# Patient Record
Sex: FEMALE | Race: WHITE | NOT HISPANIC OR LATINO | Employment: PART TIME | URBAN - METROPOLITAN AREA
[De-identification: names, ages, dates, MRNs, and addresses within clinical notes are randomized per-mention and may not be internally consistent; named-entity substitution may affect disease eponyms.]

---

## 2017-01-10 ENCOUNTER — LAB CONVERSION - ENCOUNTER (OUTPATIENT)
Dept: OTHER | Facility: OTHER | Age: 62
End: 2017-01-10

## 2017-01-10 LAB
25(OH)D3 SERPL-MCNC: 50 NG/ML (ref 30–100)
CREATININE, RANDOM URINE (HISTORICAL): 265 MG/DL (ref 20–320)
HBA1C MFR BLD HPLC: 6.3 % OF TOTAL HGB
MAGNESIUM, UR (HISTORICAL): 2.1 MG/DL
MICROALBUMIN/CREATININE RATIO (HISTORICAL): 8 MCG/MG CREAT
TSH SERPL DL<=0.05 MIU/L-ACNC: 1.66 MIU/L (ref 0.4–4.5)

## 2017-01-12 ENCOUNTER — ALLSCRIPTS OFFICE VISIT (OUTPATIENT)
Dept: OTHER | Facility: OTHER | Age: 62
End: 2017-01-12

## 2017-01-17 ENCOUNTER — ALLSCRIPTS OFFICE VISIT (OUTPATIENT)
Dept: OTHER | Facility: OTHER | Age: 62
End: 2017-01-17

## 2017-04-11 ENCOUNTER — LAB CONVERSION - ENCOUNTER (OUTPATIENT)
Dept: OTHER | Facility: OTHER | Age: 62
End: 2017-04-11

## 2017-04-11 LAB
A/G RATIO (HISTORICAL): 1.6 (CALC) (ref 1–2.5)
ALBUMIN SERPL BCP-MCNC: 4.1 G/DL (ref 3.6–5.1)
ALP SERPL-CCNC: 70 U/L (ref 33–130)
ALT SERPL W P-5'-P-CCNC: 10 U/L (ref 6–29)
AST SERPL W P-5'-P-CCNC: 12 U/L (ref 10–35)
BILIRUB SERPL-MCNC: 0.4 MG/DL (ref 0.2–1.2)
BILIRUBIN DIRECT (HISTORICAL): 0.1 MG/DL
CHOLEST SERPL-MCNC: 198 MG/DL (ref 125–200)
CHOLEST/HDLC SERPL: 4 (CALC)
GAMMA GLOBULIN (HISTORICAL): 2.6 G/DL (CALC) (ref 1.9–3.7)
HBA1C MFR BLD HPLC: 5.9 % OF TOTAL HGB
HDLC SERPL-MCNC: 49 MG/DL
INDIRECT BILIRUBIN (HISTORICAL): 0.3 MG/DL (CALC) (ref 0.2–1.2)
LDL CHOLESTEROL (HISTORICAL): 111 MG/DL (CALC)
NON-HDL-CHOL (CHOL-HDL) (HISTORICAL): 149 MG/DL (CALC)
TOTAL PROTEIN (HISTORICAL): 6.7 G/DL (ref 6.1–8.1)
TRIGL SERPL-MCNC: 188 MG/DL

## 2017-04-13 ENCOUNTER — ALLSCRIPTS OFFICE VISIT (OUTPATIENT)
Dept: OTHER | Facility: OTHER | Age: 62
End: 2017-04-13

## 2017-04-13 DIAGNOSIS — Z13.820 ENCOUNTER FOR SCREENING FOR OSTEOPOROSIS: ICD-10-CM

## 2017-04-21 ENCOUNTER — ALLSCRIPTS OFFICE VISIT (OUTPATIENT)
Dept: OTHER | Facility: OTHER | Age: 62
End: 2017-04-21

## 2017-05-23 DIAGNOSIS — Z12.31 ENCOUNTER FOR SCREENING MAMMOGRAM FOR MALIGNANT NEOPLASM OF BREAST: ICD-10-CM

## 2017-07-10 ENCOUNTER — HOSPITAL ENCOUNTER (OUTPATIENT)
Dept: BONE DENSITY | Facility: IMAGING CENTER | Age: 62
Discharge: HOME | End: 2017-07-10
Payer: COMMERCIAL

## 2017-07-10 DIAGNOSIS — Z12.31 ENCOUNTER FOR SCREENING MAMMOGRAM FOR MALIGNANT NEOPLASM OF BREAST: ICD-10-CM

## 2017-07-10 DIAGNOSIS — Z13.820 ENCOUNTER FOR SCREENING FOR OSTEOPOROSIS: ICD-10-CM

## 2017-07-10 PROCEDURE — G0202 SCR MAMMO BI INCL CAD: HCPCS

## 2017-07-10 PROCEDURE — 77080 DXA BONE DENSITY AXIAL: CPT

## 2017-07-11 ENCOUNTER — LAB CONVERSION - ENCOUNTER (OUTPATIENT)
Dept: OTHER | Facility: OTHER | Age: 62
End: 2017-07-11

## 2017-07-11 LAB — HBA1C MFR BLD HPLC: 5.9 % OF TOTAL HGB

## 2017-07-18 ENCOUNTER — ALLSCRIPTS OFFICE VISIT (OUTPATIENT)
Dept: OTHER | Facility: OTHER | Age: 62
End: 2017-07-18

## 2017-07-18 DIAGNOSIS — Z12.2 ENCOUNTER FOR SCREENING FOR MALIGNANT NEOPLASM OF RESPIRATORY ORGANS: ICD-10-CM

## 2017-11-16 ENCOUNTER — LAB CONVERSION - ENCOUNTER (OUTPATIENT)
Dept: OTHER | Facility: OTHER | Age: 62
End: 2017-11-16

## 2017-11-16 LAB — HBA1C MFR BLD HPLC: 6 % OF TOTAL HGB

## 2017-11-30 ENCOUNTER — GENERIC CONVERSION - ENCOUNTER (OUTPATIENT)
Dept: OTHER | Facility: OTHER | Age: 62
End: 2017-11-30

## 2018-01-09 ENCOUNTER — ALLSCRIPTS OFFICE VISIT (OUTPATIENT)
Dept: OTHER | Facility: OTHER | Age: 63
End: 2018-01-09

## 2018-01-10 NOTE — MISCELLANEOUS
Message   Recorded as Task   Date: 06/14/2016 04:38 PM, Created By: Mary Lassiter   Task Name: Call Back   Assigned To: Mary Lassiter   Regarding Patient: Colin Vivar, Status: Active   CommentEstephania Nagel - 14 Jun 2016 4:38 PM     TASK CREATED  In May patient had a biopsy of her left breast which turned out to be an abscess  Radiology upstairs never told her when she should have her next mammogram  Can you please call them and asked him to ask Dr Ronni Houser when she should go and get a routine mammogram, or should she get any other diagnostic mammograms? Charlene James - 17 Jun 2016 8:23 AM     TASK REPLIED TO: Previously Assigned To Charlene James  Pt goes back to screening mammo due on 05/23/2017  Pt was informed   MRP        Signatures   Electronically signed by : Fausto Rubalcava DO; Jun 17 2016  9:49AM EST                       (Author)

## 2018-01-10 NOTE — PROGRESS NOTES
Plan   Boil, axilla    · Cefadroxil 500 MG Oral Capsule; Take one pill twice a day until gone      For this boil that patient has she will use the antibiotic twice daily for 7 days     If she could think of it twice a day she will put hot compresses on it for 5 minutes each time this also helps to bring the antibiotic to the area and also helps to bring it to a head     If it comes to a head then patient can open it or we can open it     If it does not come to a head should slowly fade away     If it does not do anything by time she finishes the antibiotics that she is to call and we will try different antibiotic          Otherwise recheck as scheduled or as needed       Discussion/Summary   Possible side effects of new medications were reviewed with the patient/guardian today  The treatment plan was reviewed with the patient/guardian  The patient/guardian understands and agrees with the treatment plan      Chief Complaint   Pt presents here with an underarm lump;   due 12/2019 done 07/2017 due 01/2018 due 07/10/2018 due 10/2018      History of Present Illness   HPI: Patient is here with a lump under her right arm in her axilla for couple weeks  She thinks that the boil as she has had them before however this 1 is very stagnant  It is not coming to the head and it is not going away  She has no fevers or chills and is otherwise fine   of her review of systems is negative      Active Problems   1  Benign colon polyp (211 3) (K63 5)   2  Depression (311) (F32 9)   3  Diabetes mellitus, type 2 (250 00) (E11 9)   4  Essential hypertension (401 9) (I10)   5  External Hemorrhoids   6  Hyperlipidemia (272 4) (E78 5)   7  Psoriasis (696 1) (L40 9)   8  Uncomplicated alcohol abuse (305 00) (F10 10)   9  Vitamin D deficiency (268 9) (E55 9)    Past Medical History   1  History of Abnormal finding on mammography (793 80) (R92 8)   2  History of Achrochordon (701 9) (L91 8)   3  History of Actinic keratosis (702 0) (L57 0)   4  Acute bronchitis (466 0) (J20 9)   5  Acute bronchitis (466 0) (J20 9)   6  History of Anxiety (300 00) (F41 9)   7  History of Breast abscess of female (611 0) (N61 1)   8  History of Breast Lump   9  History of Cough (786 2) (R05)   10  History of chest pain (V13 89) (Z87 898)   11  History of diarrhea (V12 79) (Z87 898)   12  History of fever (V13 89) (Z87 898)   13  History of hemorrhoids (V13 89) (Z87 19)   14  History of hemorrhoids (V13 89) (Z87 19)   15  History of menorrhagia (V13 29) (Z87 42)   16  History of viral infection (V12 09) (Z86 19)   17  History of Hypertrophic condition of skin (701 9) (L91 9)   18  History of Macrocytosis (289 89) (D75 89)   19  History of Viral warts (078 10) (B07 9)    Family History   Mother    1  Family history of Emphysema   2  Family history of Mother  At Age 69   2  No family history of mental disorder   4  Family history of Tobacco Use  Father    5  Family history of Alzheimer's dementia   6  Family history of    7  Family history of Hypertension (V17 49)   8  No family history of mental disorder  Brother    5  Family history of Alcohol Use (History)    Social History    · Being A Social Drinker   · 5-6 mixed drinks every other weekend   · Current every day smoker (305 1) (F17 200)   · Smokes 1 1/2pk of cigarettes a day  · Drinks coffee   · Drinks 1 cup a day  · No drug use   · Uses Safety Equipment - Seatbelts  The social history was reviewed and updated today  Surgical History   1  History of Breast Surgery Lumpectomy    Current Meds    1  Citalopram Hydrobromide 20 MG Oral Tablet; one tab once daily; Therapy: 90RIE2149 to (Evaluate:2018)  Requested for: 2017; Last     Rx:2017 Ordered   2  MetFORMIN HCl  MG Oral Tablet Extended Release 24 Hour; TAKE 1 TABLET BY     MOUTH WITH BREAKFAST AND TAKE 1 TABLET WITH AN EVENING MEAL;      Therapy: 43AWA9350 to (Evaluate:2018)  Requested for: 62JOD3888; Last Rx:01Nov2017 Ordered   3  Metoprolol Succinate ER 50 MG Oral Tablet Extended Release 24 Hour; take 1 tablet     every day; Therapy: 18ZJB4365 to (Evaluate:23Mar2018)  Requested for: 28Mar2017; Last     Rx:28Mar2017 Ordered   4  Proctozone-HC 2 5 % Rectal Cream; APPLY TWICE DAILY FOR THE NEXT 3 DAYS     AFTER TAKING A SITZ BATH TWICEDAILY FOR 3 DAYS; Therapy: 18BOH4173 to (Last Rx:18Sep2017)  Requested for: 18Sep2017 Ordered   5  Rosuvastatin Calcium 10 MG Oral Tablet; Take one tablet every evening; Therapy: 20CKO6454 to (Evaluate:15Jan2018)  Requested for: 54YMX2529; Last     Rx:16Wpb8266 Ordered   6  Vitamin D3 5000 UNIT Oral Capsule; 1 DAILY; Therapy: 32EZP2064 to (Last Rx:29Apr2014) Ordered    Allergies   1  No Known Drug Allergies  2  No Known Environmental Allergies   3  No Known Food Allergies    Vitals    Recorded: 79EVW3911 10:11AM   Heart Rate 72   Respiration 16   Systolic 754, LUE, Sitting   Diastolic 76, LUE, Sitting   Height 5 ft 1 in   Weight 230 lb 14 4 oz   BMI Calculated 43 63   BSA Calculated 2 01     Physical Exam   General     Patient in no acute distress, well appearing, well nourished and appears stated age          Mental status     Good judgment and insight, oriented to time person and place, recent and remote memory is intact, mood and affect are normal, cooperative, and patient is reasonable            Skin     Axilla     On the lateral border of her axilla is a 1 and 0 5 cm by 1 cm subcutaneus red mobile hard tender nodule that has not pointed         Future Appointments      Date/Time Provider Specialty Site   03/15/2018 10:00 AM Oralia Zarate 5     Signatures    Electronically signed by : Mary Kay Gregory DO; Jan 9 2018 10:23AM EST                       (Author)

## 2018-01-12 VITALS
HEIGHT: 62 IN | HEART RATE: 68 BPM | BODY MASS INDEX: 39.88 KG/M2 | SYSTOLIC BLOOD PRESSURE: 130 MMHG | RESPIRATION RATE: 16 BRPM | DIASTOLIC BLOOD PRESSURE: 70 MMHG | WEIGHT: 216.7 LBS

## 2018-01-12 VITALS
HEART RATE: 76 BPM | BODY MASS INDEX: 41 KG/M2 | HEIGHT: 62 IN | SYSTOLIC BLOOD PRESSURE: 142 MMHG | RESPIRATION RATE: 16 BRPM | DIASTOLIC BLOOD PRESSURE: 60 MMHG | WEIGHT: 222.8 LBS

## 2018-01-12 NOTE — PROGRESS NOTES
Assessment    1  Encounter for routine gynecological examination (V72 31) (Z01 419)   2  Diabetes mellitus, type 2 (250 00) (E11 9)    Plan  Diabetes mellitus, type 2    · Call 911 if: There are symptoms of ketoacidosis  ; Status:Complete;   Done: 29NBV1347   · Call 911 if: You have a seizure ; Status:Complete;   Done: 60AXX6090   · Call 911 if: You have any symptoms of a stroke ; Status:Complete;   Done: 69OAJ0782   · Call 911 if: You have fainted or passed out ; Status:Complete;   Done: 30VRY5368   · Seek Immediate Medical Attention if: There are signs that the blood sugar is too high  (hyperglycemia) ; Status:Complete;   Done: 62JXX7128   · Seek Immediate Medical Attention if: There are signs that the blood sugar is too low  (hypoglycemia) ; Status:Complete;   Done: 09NQM3134   · Seek Immediate Medical Attention if: You become dehydrated ; Status:Complete;   Done:  27CXZ5076   · Seek Immediate Medical Attention if: You notice that breathing is rapid, more than 40  times a minute ; Status:Complete;   Done: 19HTH8818   · Seek Immediate Medical Attention if: Your eyesight becomes blurry or you have difficulty  seeing ; Status:Complete;   Done: 56OON2396   · Cut your nails straight across ; Status:Complete;   Done: 45LTU3074   · Have your eyes examined by an eye doctor every year ; Status:Complete;   Done:  68WZD4210   · Start eating more fiber ; Status:Complete;   Done: 21AEX1860   · We recommend that you bring your body mass index down to 26 ; Status:Complete;    Done: 46RSE8597   · Wear shoes that give your toes plenty of room ; Status:Complete;   Done: 59KLS7942   · You need to stop smoking  Though it is not easy, more than half of all adult smokers  have quit  We encourage you to write down all the reasons you should quit smoking and  set a quit date for yourself  Ask us how we can help    You may also call  1-800-QUIT-NOW for free resources and assistance ; Status:Complete;   Done:  63ZWO2403  Encounter for routine gynecological examination    · Call [phone] if: You find a new or different kind of lump in your breast ;  Status:Complete;   Done: 41HLY8189   · Call [phone] if: You have any bleeding from the vagina ; Status:Complete;    Done: 25YIW2889   · Call [phone] if: You have any warning signs of skin cancer ; Status:Complete;    Done: 16GIY3749   · Call [phone] if: You experience a new kind of chest pain (angina) or pressure ;  Status:Complete;   Done: 03KIE8414   · Begin a limited exercise program ; Status:Complete;   Done: 48CUG2778   · Begin or continue regular aerobic exercise  Gradually work up to at least 3 sessions of 30  minutes of exercise a week ; Status:Complete;   Done: 11NND1888   · We recommend routine visits to a dentist ; Status:Complete;   Done: 68OZV0921   · We recommend that you follow the "Mediterranean diet "; Status:Complete;   Done:  52VLE0550   · (Q) THINPREP TIS PAP RFX HPV; Status:Active; Requested for:93Vrq2786;   BX: : na  PAP: : 09/2014  : postmenopausal    #1 patient's Pap test was done in internal exam was completed  Next one will be in 2 years  Her mammogram and her colonoscopy are up-to-date next    #2 her diabetes is stable still at 6 3  She has had 4 pound weight gain in the last 3 months  She will continue to do her lifestyle changes to bring her weight down and will continue on her metformin  We will recheck her in 3 months time  She will have screening blood work that will be fasting one week prior  She will get her Prevnar today  She will get her flu shot today to     Chief Complaint  Patient presents to the office today for a PAP  Last Pap test was done on 09/30/2014  Colonoscopy is due after 12/2019  Mammogram is due after 05/23/2017  EKG is due after 11/2016  DM foot exam is due fter 03/2017  DM eye exam is due but patient reports having this done and we will fax for report  Flu shot is deferred for 2016/2017    Tdap was last administered on 05/30/2010  History of Present Illness  HM, Adult Female: The patient is being seen for a gynecology evaluation  General Health: The patient's health since the last visit is described as good  She has regular dental visits  Lifestyle:  She consumes a diverse and healthy diet  She has weight concerns  She exercises regularly  She uses tobacco  She consumes alcohol  She denies drug use  Reproductive health: the patient is postmenopausal  Patient reports being about 14-15 when her periods first started  Screening:   HPI: Patient is here for three-month recheck on her diabetes and also for Pap test  Her mammogram is up-to-date      Review of Systems  Constitutional  No fatigue, No weight loss, No weight gain, No fever, No chills    Respiratory  No dyspnea, No cough, No hemoptysis, No wheezing, No pleuritic pain    Cardiovascular  No chest pain, No palpitations, No arrhythmia, No orthopnea, No nocturnal dyspnea, No edema, No claudication    Breasts (R,L)  No discharge from nipple, No breast tenderness, No breast mass    Gastrointestinal  No loss of appetite, No dysphagia, No abdominal pain, No nausea, No vomiting, No change in bowel habits, No diarrhea, No constipation, No blood in stool    Genitourinary, Female  No increased frequency of urination, No dysuria, No hematuria, No nocturia, No urinary incontinence, No vaginal discharge, No abnormal vaginal bleeding, No pelvic pain, No menstrual problem, No menopausal problem    Neurologic  No headache, No dizziness, No lightheadedness, No syncope, No vertigo, No weakness, No numbness, No paresthesia, No tremor    Psychiatric  No difficulty sleeping, No mood swings, Not feeling anxious, Not feeling depressed, No confusion, No memory loss    Muscular skeletal  Myalgias, arthralgias, no joint swelling or stiffness, no limb pain or swelling    Heme  No swollen glands, no easy bruising         Active Problems    1   Abnormal finding on mammography (793 80) (R92 8) 2  Benign colon polyp (211 3) (K63 5)   3  Breast abscess of female (611 0) (N61 1)   4  Depression (311) (F32 9)   5  Diabetes mellitus, type 2 (250 00) (E11 9)   6  Encounter for routine gynecological examination (V72 31) (Z01 419)   7  Encounter for screening colonoscopy (V76 51) (Z12 11)   8  Encounter for screening mammogram for malignant neoplasm of breast (V76 12)   (Z12 31)   9  Encounter for therapeutic drug monitoring (V58 83) (Z51 81)   10  Essential hypertension (401 9) (I10)   11  External Hemorrhoids   12  Fatigue (780 79) (R53 83)   13  Hyperlipidemia (272 4) (E78 5)   14  Insomnia (780 52) (G47 00)   15  Long term use of drug (V58 69) (Z79 899)   16  Need for pneumococcal vaccination (V03 82) (Z23)   17  Need for prophylactic vaccination and inoculation against influenza (V04 81) (Z23)   18  History of Need for vaccination for DTP (V06 1) (Z23)   19  Psoriasis (696 1) (L40 9)   20  Screening for respiratory organ cancer (V76 0) (Z12 2)   21  Uncomplicated alcohol abuse (305 00) (F10 10)   22   Vitamin D deficiency (268 9) (E55 9)    Past Medical History    · History of Achrochordon (701 9) (L91 8)   · History of Actinic keratosis (702 0) (L57 0)   · History of Anxiety (300 00) (F41 9)   · History of Breast Lump   · History of Cough (786 2) (R05)   · History of chest pain (V13 89) (D51 880)   · History of diarrhea (V12 79) (D92 393)   · History of hemorrhoids (V13 89) (Z87 19)   · History of hemorrhoids (V13 89) (Z87 19)   · History of menorrhagia (V13 29) (Z87 42)   · History of viral infection (V12 09) (Z86 19)   · History of Hypertrophic condition of skin (701 9) (L91 9)   · History of Macrocytosis (289 89) (D75 89)   · Need for pneumococcal vaccination (V03 82) (Z23)   · History of Need for vaccination for DTP (V06 1) (Z23)   · History of Viral warts (078 10) (B07 9)    Surgical History    · History of Breast Surgery Lumpectomy    Family History  Mother    · Family history of Emphysema   · Family history of Mother  At Age 67   · Family history of Tobacco Use  Father    · Family history of Alzheimer's dementia   · Family history of    · Family history of Hypertension (V17 49)  Brother    · Family history of Alcohol Use (History)    Social History    · Being A Social Drinker   · 5-6 mixed drinks every other weekend   · Current every day smoker (305 1) (F17 200)   · Smokes 1 1/2pk of cigarettes a day  · Drinks coffee   · Drinks 1 cup a day  · No drug use   · Uses Safety Equipment - Seatbelts    Current Meds   1  Atorvastatin Calcium 40 MG Oral Tablet; Take one pill each evening; Therapy: 65Oou1258 to (Last Rx:2016)  Requested for: 2016 Ordered   2  Citalopram Hydrobromide 20 MG Oral Tablet; take one tablet by mouth daily; Therapy: 79BFK6792 to (Last Rx:2016)  Requested for: 2016 Ordered   3  MetFORMIN HCl  MG Oral Tablet Extended Release 24 Hour; TAKE 1 TABLET BY   MOUTH WITH BREAKFAST AND 1 TABLET WITH EVENING MEAL; Therapy: 86YQW3936 to (Evaluate:45Hyc7625)  Requested for: 54MZN4412; Last   Rx:52Ath4074 Ordered   4  Metoprolol Succinate ER 50 MG Oral Tablet Extended Release 24 Hour; take one tablet   by mouth daily; Therapy: 16WZU8676 to (Last Rx:2016)  Requested for: 2016 Ordered   5  Proctozone-HC 2 5 % Rectal Cream; APPLY TWICE DAILY FOR THE NEXT 3 DAYS   AFTER TAKING A SITZ BATH TWICEDAILY FOR 3 DAYS; Therapy: 71JLH6355 to (Last Rx:41Hrb9149)  Requested for: [phone] 501-992 Ordered   6  Vitamin D3 5000 UNIT Oral Capsule; 1 DAILY; Therapy: 46NZB4761 to (Last Rx:2014) Ordered    Allergies    1  No Known Drug Allergies    2  No Known Environmental Allergies   3   No Known Food Allergies    Vitals   Recorded: 03UAU8042 75:02NR   Systolic 873   Diastolic 68   Heart Rate 76   Respiration 16   Height 5 ft 1 5 in   Weight 221 lb 8 0 oz   BMI Calculated 41 17   BSA Calculated 1 98     Physical Exam  Constitutional  Appears well, Looks well, Appearance consistent with age    Mental Status  Alert, Cooperative, oriented to time person and place, recent and remote memory intact, mood and affect normal    Neck  No neck mass, No thyromegaly, No thyroid nodule, No thyroid tenderness    Respiratory  Respiratory effort normal, Breath sounds normal, No rales, No rhonchi, No wheezing     Cardiac  Heart rate normal, Heart rhythm normal, Heart sounds normal, No heart murmur    Vascular  Carotid pulse normal, No carotid bruit, No varicose veins, No leg edema    Breasts (R,L)  No discharge from nipple, No breast tenderness, No breast mass, No nipple retraction, No skin changes    Gastrointestinal  Bowel sounds normal, Abdomen soft, No hepatomegaly, No splenomegaly, No abdominal tenderness, No abdominal mass, No hernia, No hemorrhoids    Genitourinary, Female  Vulva normal, No erythema of urethra    Genitourinary, Female  Vulva normal, No erythema of vulva, Vaginal mucosa normal, No vaginal discharge, No lesion of urethra, No urethral discharge, No bladder distention, No bladder tenderness, Cervix normal, No cervical inflammation, No cervical lesion, No cervical discharge, No cervical tenderness, Uterus normal size, No uterine tenderness, No adnexal tenderness, No adnexal mass, No pelvic mass    Lymphatics  No axillary lymphadenopathy, No inguinal lymphadenopathy    Skin  Pattern of hair growth normal, No skin rash, No skin lesion, No acne        Results/Data  (Q) HEMOGLOBIN A1c 05Oct2016 07:43AM Yahir Cosby   REPORT COMMENT:  FASTING:UNKNOWN     Test Name Result Flag Reference   HEMOGLOBIN A1c 6 3 % of total Hgb H <5 7   According to ADA guidelines, hemoglobin A1c <7 0%  represents optimal control in non-pregnant diabetic  patients  Different metrics may apply to specific  patient populations  Standards of Medical Care in  [phone]  Diabetes Care   2013;36:s11-s66     For the purpose of screening for the presence of  diabetes  <5 7%       Consistent with the absence of diabetes  5 7-6 4%    Consistent with increased risk for diabetes              (prediabetes)  >or=6 5%    Consistent with diabetes     This assay result is consistent with a higher risk  of diabetes  Currently, no consensus exists for use of hemoglobin  A1c for diagnosis of diabetes for children  (Q) HEMOGLOBIN A1c 11KMD4911 07:49AM Dallin Angel   REPORT COMMENT:  FASTING:UNKNOWN     Test Name Result Flag Reference   HEMOGLOBIN A1c 6 3 % of total Hgb H <5 7   According to ADA guidelines, hemoglobin A1c <7 0%  represents optimal control in non-pregnant diabetic  patients  Different metrics may apply to specific  patient populations  Standards of Medical Care in    Diabetes Care  2013;36:s11-s66     For the purpose of screening for the presence of  diabetes  <5 7%       Consistent with the absence of diabetes  5 7-6 4%    Consistent with increased risk for diabetes              (prediabetes)  >or=6 5%    Consistent with diabetes     This assay result is consistent with a higher risk  of diabetes  Currently, no consensus exists for use of hemoglobin  A1c for diagnosis of diabetes for children  Health Management  Encounter for screening colonoscopy   COLONOSCOPY; every 5 years; Last 09Sbg1210; Next Due: 47VUN3949; Active  Encounter for screening mammogram for malignant neoplasm of breast   Digital Bilateral Screening Mammogram With CAD; every 1 year; Last 82KXX6940; Next  Due: 01LDY9014; Active  Essential hypertension   EKG/ECG- POC; every 1 year; Last 10FCL3496;  Next Due: 19VRB5386; Near Due    Signatures   Electronically signed by : Kristen Valle DO; Oct 10 2016  4:01PM EST                       (Author)

## 2018-01-13 VITALS
RESPIRATION RATE: 16 BRPM | DIASTOLIC BLOOD PRESSURE: 70 MMHG | HEART RATE: 72 BPM | WEIGHT: 221 LBS | BODY MASS INDEX: 40.67 KG/M2 | HEIGHT: 62 IN | SYSTOLIC BLOOD PRESSURE: 130 MMHG

## 2018-01-14 VITALS
WEIGHT: 217.3 LBS | SYSTOLIC BLOOD PRESSURE: 130 MMHG | HEIGHT: 62 IN | BODY MASS INDEX: 39.99 KG/M2 | TEMPERATURE: 99.4 F | RESPIRATION RATE: 16 BRPM | DIASTOLIC BLOOD PRESSURE: 70 MMHG | HEART RATE: 80 BPM

## 2018-01-14 VITALS
WEIGHT: 219.7 LBS | HEART RATE: 72 BPM | HEIGHT: 62 IN | SYSTOLIC BLOOD PRESSURE: 120 MMHG | DIASTOLIC BLOOD PRESSURE: 70 MMHG | BODY MASS INDEX: 40.43 KG/M2 | RESPIRATION RATE: 16 BRPM

## 2018-01-14 NOTE — MISCELLANEOUS
Message   Recorded as Task   Date: 05/31/2016 12:00 PM, Created By: Kristina Forrester   Task Name: Follow Up   Assigned To: Erickson Willams   Regarding Patient: Mildred Strickland, Status: Active   Lola White - 31 May 2016 12:00 PM     TASK CREATED  I will be calling this patient with her biopsy results  It looks like she may have an abcess  Clinical management is recommended     [address] [place] [address] - 01 Jun 2016 8:35 AM     TASK REASSIGNED: Previously Assigned To [facility],Team        Signatures   Electronically signed by : Philippe Nuñez DO; Jun 1 2016 12:40PM EST                       (Author)

## 2018-01-16 NOTE — MISCELLANEOUS
Message   Recorded as Task   Date: 05/31/2016 02:13 PM, Created By: Jonathan Patterson   Task Name: Medical Complaint Callback   Assigned To: Erickson Willams   Regarding Patient: Prema Davis, Status: Active   CommentReggy [facility] - 31 May 2016 2:13 PM     TASK CREATED  Caller: Self; Medical Complaint; [phone] (Home); [phone] (Work)  pt called bc upstairs did a biopsy of breast and told her it was an infection and you might want to call antibiotics to Jane Lutz - 31 May 2016 3:40 PM     TASK REPLIED TO: Previously Assigned To Erickson Willams  Antibiotic called in for one twice a day for 10 days  Tell patient to follow-up with me in 2 weeks so I can check to see if there is anything left by exam   Asked her now she knows if the abscess was drained at all  Jonathan Patterson - 31 May 2016 3:51 PM     TASK REPLIED TO: Previously Assigned To [facility]  she wants to know if she can also move the recheck on 6/6 back  You do have a 30min spot to do both on 6/14 if you are ok with that  pt is waiting for an answer   Al Delgado - 31 May 2016 4:04 PM     TASK EDITED  pls reply to someone else   Gregg Roche - 31 May 2016 5:18 PM     TASK REASSIGNED: Previously Assigned To Gregg Roche  Yes, we can do both things on June 14, and cancel the June 6 appointment  Charlene James - 01 Jun 2016 8:30 AM     TASK REPLIED TO: Previously Assigned To Charlene James regarding the 06/06/2016 appointment being cancelled and the 06/14/2016 appointment in place   MRP        Signatures   Electronically signed by : Joanna Banegas DO; Jun 1 2016 12:44PM EST                       (Author)

## 2018-01-22 VITALS
HEART RATE: 84 BPM | BODY MASS INDEX: 41.97 KG/M2 | WEIGHT: 228.1 LBS | HEIGHT: 62 IN | SYSTOLIC BLOOD PRESSURE: 124 MMHG | DIASTOLIC BLOOD PRESSURE: 70 MMHG | RESPIRATION RATE: 16 BRPM

## 2018-01-23 VITALS
BODY MASS INDEX: 43.59 KG/M2 | RESPIRATION RATE: 16 BRPM | WEIGHT: 230.9 LBS | HEART RATE: 72 BPM | HEIGHT: 61 IN | SYSTOLIC BLOOD PRESSURE: 126 MMHG | DIASTOLIC BLOOD PRESSURE: 76 MMHG

## 2018-02-05 ENCOUNTER — OFFICE VISIT (OUTPATIENT)
Dept: FAMILY MEDICINE CLINIC | Facility: CLINIC | Age: 63
End: 2018-02-05
Payer: COMMERCIAL

## 2018-02-05 VITALS
BODY MASS INDEX: 42.76 KG/M2 | HEIGHT: 62 IN | RESPIRATION RATE: 16 BRPM | DIASTOLIC BLOOD PRESSURE: 80 MMHG | WEIGHT: 232.4 LBS | HEART RATE: 76 BPM | SYSTOLIC BLOOD PRESSURE: 122 MMHG

## 2018-02-05 DIAGNOSIS — H16.002 ULCER OF LEFT CORNEA: Primary | ICD-10-CM

## 2018-02-05 PROCEDURE — 99214 OFFICE O/P EST MOD 30 MIN: CPT | Performed by: FAMILY MEDICINE

## 2018-02-05 RX ORDER — METOPROLOL SUCCINATE 50 MG/1
1 TABLET, EXTENDED RELEASE ORAL DAILY
COMMUNITY
Start: 2013-03-11 | End: 2018-03-14 | Stop reason: SDUPTHER

## 2018-02-05 RX ORDER — CITALOPRAM 20 MG/1
TABLET ORAL
COMMUNITY
Start: 2012-12-11 | End: 2018-04-21 | Stop reason: SDUPTHER

## 2018-02-05 RX ORDER — ROSUVASTATIN CALCIUM 10 MG/1
TABLET, COATED ORAL
Refills: 1 | COMMUNITY
Start: 2018-01-19 | End: 2018-07-19 | Stop reason: SDUPTHER

## 2018-02-05 RX ORDER — METFORMIN HYDROCHLORIDE 500 MG/1
TABLET, EXTENDED RELEASE ORAL
Refills: 0 | COMMUNITY
Start: 2017-11-01 | End: 2018-08-27 | Stop reason: SDUPTHER

## 2018-02-05 NOTE — PROGRESS NOTES
Assessment/Plan:    No problem-specific Assessment & Plan notes found for this encounter  There are no diagnoses linked to this encounter  Return as scheduled or as needed    Subjective:     No chief complaint on file  Yesterday patient rub her left eye because she felt like something was in it  Then it became very itchy and it swelled  Felt like something was in the upper lid  She ran stir I last night she tried to get it out but still was swollen and then swelled shut  She put a cold pack on it that felt better  Feels better if she keeps a close  There was some crusty discharge this morning so she thought maybe she has pinkeye and this is why she is here        No past medical history on file  Social History   Substance Use Topics    Smoking status: Not on file    Smokeless tobacco: Not on file    Alcohol use Not on file     No family history on file  MEDICATIONS REVIEWED AND UPDATED     Rest Of 8 Point Review Of System Negative    Objective: There were no vitals filed for this visit  There is no height or weight on file to calculate BMI  General  Patient in no acute distress, well appearing, well nourished and appears stated age    Mental status  Good judgment and insight, oriented to time person and place, recent and remote memory is intact, mood and affect are normal, cooperative, and patient is reasonable  Eyes  PERRLA at 3 mm EOMI without nystagmus  No foreign body seen in the upper eyelid  Floor seen study positive for a very tiny ulcer at 11 o'clock on her cornea  Conjunctiva both her red and irritated    I was cleaned to floor seen, sodium sole my drop was applied and I was Tia Annelise so that her eyelid could not open when she opened her other eye

## 2018-02-06 ENCOUNTER — OFFICE VISIT (OUTPATIENT)
Dept: FAMILY MEDICINE CLINIC | Facility: CLINIC | Age: 63
End: 2018-02-06
Payer: COMMERCIAL

## 2018-02-06 VITALS
HEART RATE: 60 BPM | WEIGHT: 233 LBS | SYSTOLIC BLOOD PRESSURE: 122 MMHG | DIASTOLIC BLOOD PRESSURE: 88 MMHG | BODY MASS INDEX: 42.62 KG/M2 | RESPIRATION RATE: 16 BRPM

## 2018-02-06 DIAGNOSIS — H16.002 ULCER OF LEFT CORNEA: Primary | ICD-10-CM

## 2018-02-06 PROCEDURE — 99214 OFFICE O/P EST MOD 30 MIN: CPT | Performed by: FAMILY MEDICINE

## 2018-02-06 NOTE — ASSESSMENT & PLAN NOTE
1   Under floor seen today patient is ulcer looks bigger and possible expanded  It was about a 0 5 mm yesterday and now it is about a mm  It is hard to tell if there is something in bed in the cornea are not    2  Because of this we will get her an ophthalmological appointment today so this can be evaluated with a slip microscope and treated

## 2018-02-06 NOTE — PROGRESS NOTES
Assessment/Plan:    Ulcer of left cornea  1  Under floor seen today patient is ulcer looks bigger and possible expanded  It was about a 0 5 mm yesterday and now it is about a mm  It is hard to tell if there is something in bed in the cornea are not    2  Because of this we will get her an ophthalmological appointment today so this can be evaluated with a slip microscope and treated  Donny was seen today for follow-up  Diagnoses and all orders for this visit:    Ulcer of left cornea          Return as scheduled or as needed    Subjective:     Chief Complaint   Patient presents with    Follow-up     Scratched cornea     HPI    No past medical history on file  Social History   Substance Use Topics    Smoking status: Current Every Day Smoker     Packs/day: 1 50     Types: Cigarettes    Smokeless tobacco: Never Used    Alcohol use Yes      Comment: Social     No family history on file  MEDICATIONS REVIEWED AND UPDATED     Rest Of 8 Point Review Of System Negative    Objective:    Vitals:    02/06/18 1138   BP: 122/88   Pulse: 60   Resp: 16     Body mass index is 42 62 kg/m²

## 2018-02-06 NOTE — PATIENT INSTRUCTIONS
Ulcer of left cornea  1  Under floor seen today patient is ulcer looks bigger and possible expanded  It was about a 0 5 mm yesterday and now it is about a mm  It is hard to tell if there is something in bed in the cornea are not    2  Because of this we will get her an ophthalmological appointment today so this can be evaluated with a slip microscope and treated

## 2018-03-14 DIAGNOSIS — I10 ESSENTIAL HYPERTENSION: Primary | ICD-10-CM

## 2018-03-14 RX ORDER — METOPROLOL SUCCINATE 50 MG/1
TABLET, EXTENDED RELEASE ORAL
Qty: 90 TABLET | Refills: 3 | Status: SHIPPED | OUTPATIENT
Start: 2018-03-14 | End: 2019-03-12 | Stop reason: SDUPTHER

## 2018-03-29 LAB — HBA1C MFR BLD: 6 % OF TOTAL HGB

## 2018-03-29 RX ORDER — OFLOXACIN 3 MG/ML
SOLUTION/ DROPS OPHTHALMIC
Refills: 0 | COMMUNITY
Start: 2018-02-06 | End: 2018-04-03 | Stop reason: ALTCHOICE

## 2018-04-02 NOTE — PROGRESS NOTES
ASSESSMENT/PLAN:    Diabetes  A1c is stable at 6 0  We will continue metformin 500 mg and recheck her A1c in the next 3 months  At that time we will decide whether to keep this 1 pill or not    Hyperlipidemia   Cholesterol is excellent on rosuvastatin we will continue the same      Hypertension   Blood pressure is excellent with metoprolol  continue the same     Depression   Continue citalopram      Vitamin-D deficiency   Continue vitamin-D 5000 units daily        She will recheck in 3 months A1c wants microalbumin screening labs vitamin-D     Next colonoscopy is December 2019   Patient's DEXA normal in July 2020     Her mammogram is next due 7/10/2018     Next Pap test October 2018    Patient will try to get the CT scan of her lungs for smoking history done        Health Maintenance   Topic Date Due    HIV SCREENING  1955    Hepatitis C Screening  1955    OPHTHALMOLOGY EXAM  04/30/2017    URINE MICROALBUMIN  01/09/2018    Diabetic Foot Exam  04/13/2018    MAMMOGRAM  07/10/2018    Depression Screening PHQ-9  11/20/2018    COLONOSCOPY  12/23/2019    DTaP,Tdap,and Td Vaccines (2 - Tdap) 05/03/2020    DXA SCAN  07/10/2020    INFLUENZA VACCINE  Completed    PNEUMOCOCCAL POLYSACCHARIDE VACCINE AGE 2-64 HIGH RISK  Completed         Problem List as of 4/3/2018 Reviewed: 2/6/2018 11:52 AM by Ranjit Palm DO    Benign colon polyp    Depression    Diabetes mellitus, type 2 ([facility] [address] )    Essential hypertension    Hyperlipidemia    Insomnia    Psoriasis    Ulcer of left cornea    Last Assessment & Plan 2/6/2018 Office Visit Written 2/6/2018 11:49 AM by Ranjit Palm DO     1  Under floor seen today patient is ulcer looks bigger and possible expanded  It was about a 0 5 mm yesterday and now it is about a mm  It is hard to tell if there is something in bed in the cornea are not    2    Because of this we will get her an ophthalmological appointment today so this can be evaluated with a slip microscope and treated  Uncomplicated alcohol abuse    Vitamin D deficiency            Subjective:   Chief Complaint   Patient presents with    Follow-up     Patient is here for her 3 month diabetes recheck  She feels well without any complaints  She is taking her medications as instructed  She is doing excellent with alcohol without any problems  patient ID: Gino Villanueva is a 65 y o  female      Past Medical History:   Diagnosis Date    Insomnia     Last assessed: 4/13/17     Past Surgical History:   Procedure Laterality Date    BREAST LUMPECTOMY       Family History   Problem Relation Age of Onset   [facility] Emphysema Mother     Alzheimer's disease Father     Hypertension Father     Alcohol abuse Brother      Social History   Substance Use Topics    Smoking status: Current Every Day Smoker     Packs/day: 1 50     Types: Cigarettes    Smokeless tobacco: Never Used    Alcohol use Yes      Comment: Social Per Allcripts: 5-6 mixzed drinks every other weekend     History   Smoking Status    Current Every Day Smoker    Packs/day: 1 50    Types: Cigarettes   Smokeless Tobacco    Never Used        MED LIST WAS REVIEWED AND UPDATED       ROS    Constitutional  No fever chills no fatigue no weight loss no weight gain    Mental status  No anxiety or depression and no sleep disturbances no changes in personality or emotional problems no suicidal or homicidal ideations    Eyes  No eye pain no red eyes no visual disturbances no discharge no eye itch    ENT  No earache no hearing loss nasal discharge no sore throat no hoarseness no postnasal drip     Cardio  No chest pain no palpitations no leg edema no claudication no dyspnea on exertion no nocturnal dyspnea    Respiratory  No shortness of breath or wheeze no cough no orthopnea no dyspnea on exertion no hemoptysis no sputum production    GI  No abdominal pain no nausea no vomiting no diarrhea or constipation no bloody stools no change in bowel habits no change in weight      no dysuria hematuria no pyuria no incontinence no pelvic pain    Musculoskeletal  No myalgias arthralgias no joint swelling or stiffness no limb pain or swelling    Skin  No rashes or lesions no itchiness and no wounds    Neuro  No headache dizziness lightheadedness syncope numbness paresthesias or confusion    Heme  No swollen glands no easy bruising          Objective:    Radiology (if applicable):  Mammo screening bilateral w cad    (Results Pending)        VITALS:  Wt Readings from Last 3 Encounters:   04/03/18 107 kg (236 lb 4 8 oz)   02/06/18 106 kg (233 lb)   02/05/18 105 kg (232 lb 6 4 oz)     BP Readings from Last 3 Encounters:   04/03/18 110/70   02/06/18 122/88   02/05/18 122/80     Pulse Readings from Last 3 Encounters:   04/03/18 72   02/06/18 60   02/05/18 76     Body mass index is 43 57 kg/m²      Laboratory Results:   Lab Results   Component Value Date    WBC 10 6 01/09/2017    WBC NONE SEEN 01/09/2017    WBC 7 63 01/03/2015    HGB 15 0 01/09/2017    HGB 14 9 01/03/2015    HCT 46 9 (H) 01/09/2017    HCT 45 3 01/03/2015    MCV 96 7 01/09/2017    MCV 98 01/03/2015     01/09/2017     01/03/2015     Lab Results   Component Value Date     01/09/2017     01/03/2015    K 4 4 01/09/2017    K 4 0 01/03/2015    CL 99 01/09/2017    CL 98 (L) 01/03/2015    CO2 30 01/09/2017    CO2 29 01/03/2015    BUN 13 01/09/2017    BUN 12 01/03/2015     Lab Results   Component Value Date    GLUCOSE 102 01/03/2015    ALT 10 04/10/2017    AST 12 04/10/2017    ALKPHOS 70 04/10/2017    CALCIUM 9 1 01/09/2017     No results found for: TSH    Lipid Profile:   Lab Results   Component Value Date    CHOL 198 04/10/2017    CHOL 332 (H) 01/09/2017    CHOL 188 02/24/2016     Lab Results   Component Value Date    HDL 49 04/10/2017    HDL 49 01/09/2017    HDL 44 (L) 02/24/2016     No results found for: [facility]  Lab Results   Component Value Date    TRIG 188 (H) 04/10/2017    TRIG 241 (H) 01/09/2017 TRIG 181 (H) 02/24/2016       Diabetic labs (if applicable)  Lab Results   Component Value Date    HGBA1C 6 0 (H) 03/28/2018    HGBA1C 6 0 (H) 11/15/2017    HGBA1C 5 9 (H) 07/10/2017     Lab Results   Component Value Date    CREATININE 0 86 01/09/2017        Physical Exam   Constitutional  Appears healthy, Looks well, Appearance consistent with age    Mental Status  Alert, Oriented, Cooperative, Memory function normal , clean, and reasonable    Neck  No neck mass, No thyromegaly, Good carotid upstrokes bilaterally, trachea midline positive click    Respiratory  Breath sounds normal, No rales, No rhonchi, No wheezing, normal palpation    Cardiac   Regular rhythm without ectopy or murmur no S3-S4, no heave lift or thrill to palpation    Vascular  No leg edema, No pedal edema    Muscular skeletal  No clubbing cyanosis , muscle tone normal    Skin  No appreciable rashes or abnormal appearing lesions       Feet  Skin intact without any evidence of ulcers, sensory intact with no focal deficits, pulses equal bilaterally  No abnormalities with microfilament testing  Feet examined with no shoes and socks on the feet    Low risk for problems

## 2018-04-03 ENCOUNTER — TELEPHONE (OUTPATIENT)
Dept: FAMILY MEDICINE CLINIC | Facility: CLINIC | Age: 63
End: 2018-04-03

## 2018-04-03 ENCOUNTER — OFFICE VISIT (OUTPATIENT)
Dept: FAMILY MEDICINE CLINIC | Facility: CLINIC | Age: 63
End: 2018-04-03
Payer: COMMERCIAL

## 2018-04-03 VITALS
HEIGHT: 62 IN | SYSTOLIC BLOOD PRESSURE: 110 MMHG | RESPIRATION RATE: 16 BRPM | DIASTOLIC BLOOD PRESSURE: 70 MMHG | WEIGHT: 236.3 LBS | HEART RATE: 72 BPM | BODY MASS INDEX: 43.48 KG/M2

## 2018-04-03 DIAGNOSIS — I10 ESSENTIAL HYPERTENSION: Primary | ICD-10-CM

## 2018-04-03 DIAGNOSIS — E55.9 VITAMIN D DEFICIENCY: ICD-10-CM

## 2018-04-03 DIAGNOSIS — E78.2 MIXED HYPERLIPIDEMIA: ICD-10-CM

## 2018-04-03 DIAGNOSIS — Z01.00 DIABETIC EYE EXAM (HCC): ICD-10-CM

## 2018-04-03 DIAGNOSIS — E11.9 TYPE 2 DIABETES MELLITUS WITHOUT COMPLICATION, WITHOUT LONG-TERM CURRENT USE OF INSULIN (HCC): Primary | ICD-10-CM

## 2018-04-03 DIAGNOSIS — L40.9 PSORIASIS: ICD-10-CM

## 2018-04-03 DIAGNOSIS — Z12.31 SCREENING MAMMOGRAM, ENCOUNTER FOR: ICD-10-CM

## 2018-04-03 DIAGNOSIS — H57.12 PAIN OF LEFT EYE: ICD-10-CM

## 2018-04-03 DIAGNOSIS — E11.9 TYPE 2 DIABETES MELLITUS WITHOUT COMPLICATION, UNSPECIFIED LONG TERM INSULIN USE STATUS: ICD-10-CM

## 2018-04-03 DIAGNOSIS — I10 ESSENTIAL HYPERTENSION: ICD-10-CM

## 2018-04-03 DIAGNOSIS — G47.00 INSOMNIA, UNSPECIFIED TYPE: ICD-10-CM

## 2018-04-03 DIAGNOSIS — F33.42 RECURRENT MAJOR DEPRESSIVE DISORDER, IN FULL REMISSION (HCC): ICD-10-CM

## 2018-04-03 DIAGNOSIS — F10.10 UNCOMPLICATED ALCOHOL ABUSE: ICD-10-CM

## 2018-04-03 DIAGNOSIS — E11.9 DIABETIC EYE EXAM (HCC): ICD-10-CM

## 2018-04-03 PROBLEM — H16.002 ULCER OF LEFT CORNEA: Status: RESOLVED | Noted: 2018-02-05 | Resolved: 2018-04-03

## 2018-04-03 PROCEDURE — 99214 OFFICE O/P EST MOD 30 MIN: CPT | Performed by: FAMILY MEDICINE

## 2018-04-03 PROCEDURE — 93000 ELECTROCARDIOGRAM COMPLETE: CPT | Performed by: FAMILY MEDICINE

## 2018-04-03 NOTE — TELEPHONE ENCOUNTER
Can you please cancel the blood work slips for 09/26/2018? The patient needs the blood work slips to read for July 26,2018   MRP

## 2018-04-03 NOTE — PATIENT INSTRUCTIONS
Diabetes  A1c is stable at 6 0  We will continue metformin 500 mg and recheck her A1c in the next 3 months  At that time we will decide whether to keep this 1 pill or not    Hyperlipidemia   Cholesterol is excellent on rosuvastatin we will continue the same      Hypertension   Blood pressure is excellent with metoprolol  continue the same     Depression   Continue citalopram      Vitamin-D deficiency   Continue vitamin-D 5000 units daily        She will recheck in 3 months A1c wants microalbumin screening labs vitamin-D     Next colonoscopy is December 2019   Patient's DEXA normal in July 2020     Her mammogram is next due 7/10/2018     Next Pap test October 2018    Patient will try to get the CT scan of her lungs for smoking history done

## 2018-04-21 DIAGNOSIS — F41.9 ANXIETY: Primary | ICD-10-CM

## 2018-04-23 RX ORDER — CITALOPRAM 20 MG/1
TABLET ORAL
Qty: 90 TABLET | Refills: 1 | Status: SHIPPED | OUTPATIENT
Start: 2018-04-23 | End: 2018-08-23 | Stop reason: SINTOL

## 2018-07-19 DIAGNOSIS — E78.2 MIXED HYPERLIPIDEMIA: Primary | ICD-10-CM

## 2018-07-19 RX ORDER — ROSUVASTATIN CALCIUM 10 MG/1
TABLET, COATED ORAL
Qty: 90 TABLET | Refills: 1 | Status: SHIPPED | OUTPATIENT
Start: 2018-07-19 | End: 2019-01-11 | Stop reason: SDUPTHER

## 2018-07-24 ENCOUNTER — HOSPITAL ENCOUNTER (OUTPATIENT)
Dept: BONE DENSITY | Facility: IMAGING CENTER | Age: 63
Discharge: HOME | End: 2018-07-24
Payer: COMMERCIAL

## 2018-07-24 DIAGNOSIS — Z12.31 SCREENING MAMMOGRAM, ENCOUNTER FOR: ICD-10-CM

## 2018-07-24 PROCEDURE — 77067 SCR MAMMO BI INCL CAD: CPT

## 2018-08-09 LAB
25(OH)D3 SERPL-MCNC: 58 NG/ML (ref 30–100)
ALBUMIN SERPL-MCNC: 4.2 G/DL (ref 3.6–5.1)
ALBUMIN/CREAT UR: 12 MCG/MG CREAT
ALBUMIN/GLOB SERPL: 1.4 (CALC) (ref 1–2.5)
ALP SERPL-CCNC: 74 U/L (ref 33–130)
ALT SERPL-CCNC: 21 U/L (ref 6–29)
AST SERPL-CCNC: 19 U/L (ref 10–35)
BASOPHILS # BLD AUTO: 31 CELLS/UL (ref 0–200)
BASOPHILS NFR BLD AUTO: 0.3 %
BILIRUB SERPL-MCNC: 0.6 MG/DL (ref 0.2–1.2)
BUN SERPL-MCNC: 12 MG/DL (ref 7–25)
BUN/CREAT SERPL: 12 (CALC) (ref 6–22)
CALCIUM SERPL-MCNC: 9.4 MG/DL (ref 8.6–10.4)
CHLORIDE SERPL-SCNC: 99 MMOL/L (ref 98–110)
CHOLEST SERPL-MCNC: 190 MG/DL
CHOLEST/HDLC SERPL: 4.1 (CALC)
CO2 SERPL-SCNC: 30 MMOL/L (ref 20–32)
CREAT SERPL-MCNC: 1.01 MG/DL (ref 0.5–0.99)
CREAT UR-MCNC: 291 MG/DL (ref 20–320)
EOSINOPHIL # BLD AUTO: 125 CELLS/UL (ref 15–500)
EOSINOPHIL NFR BLD AUTO: 1.2 %
ERYTHROCYTE [DISTWIDTH] IN BLOOD BY AUTOMATED COUNT: 13 % (ref 11–15)
EST. AVERAGE GLUCOSE BLD GHB EST-MCNC: 137 (CALC)
EST. AVERAGE GLUCOSE BLD GHB EST-SCNC: 7.6 (CALC)
GLOBULIN SER CALC-MCNC: 2.9 G/DL (CALC) (ref 1.9–3.7)
GLUCOSE SERPL-MCNC: 140 MG/DL (ref 65–99)
HBA1C MFR BLD: 6.4 % OF TOTAL HGB
HCT VFR BLD AUTO: 48.8 % (ref 35–45)
HDLC SERPL-MCNC: 46 MG/DL
HGB BLD-MCNC: 16.1 G/DL (ref 11.7–15.5)
LDLC SERPL CALC-MCNC: 111 MG/DL (CALC)
LYMPHOCYTES # BLD AUTO: 1706 CELLS/UL (ref 850–3900)
LYMPHOCYTES NFR BLD AUTO: 16.4 %
MCH RBC QN AUTO: 32 PG (ref 27–33)
MCHC RBC AUTO-ENTMCNC: 33 G/DL (ref 32–36)
MCV RBC AUTO: 97 FL (ref 80–100)
MICROALBUMIN UR-MCNC: 3.5 MG/DL
MONOCYTES # BLD AUTO: 634 CELLS/UL (ref 200–950)
MONOCYTES NFR BLD AUTO: 6.1 %
NEUTROPHILS # BLD AUTO: 7904 CELLS/UL (ref 1500–7800)
NEUTROPHILS NFR BLD AUTO: 76 %
NONHDLC SERPL-MCNC: 144 MG/DL (CALC)
PLATELET # BLD AUTO: 278 THOUSAND/UL (ref 140–400)
PMV BLD REES-ECKER: 11.1 FL (ref 7.5–12.5)
POTASSIUM SERPL-SCNC: 4.5 MMOL/L (ref 3.5–5.3)
PROT SERPL-MCNC: 7.1 G/DL (ref 6.1–8.1)
RBC # BLD AUTO: 5.03 MILLION/UL (ref 3.8–5.1)
SL AMB EGFR AFRICAN AMERICAN: 70 ML/MIN/1.73M2
SL AMB EGFR NON AFRICAN AMERICAN: 60 ML/MIN/1.73M2
SODIUM SERPL-SCNC: 139 MMOL/L (ref 135–146)
TRIGL SERPL-MCNC: 214 MG/DL
TSH SERPL-ACNC: 1.7 MIU/L (ref 0.4–4.5)
WBC # BLD AUTO: 10.4 THOUSAND/UL (ref 3.8–10.8)

## 2018-08-19 NOTE — PROGRESS NOTES
ASSESSMENT/PLAN:  Document Morbid obesity     Diabetes  A1c 6 4 from 6 0  Patient will clean up her diet tried exercise and we will recheck this in 3 months  She will continue her metformin 500 1 pill daily     Hyperlipidemia  We need to get patient's  or less because of her diabetes  She is going to try to clean up her cheese habit and her fatty meat and we will recheck this in 3 months     Hypertension   Blood pressure is excellent with metoprolol  continue the same      Depression  At the time patient started citalopram a lot of things were going on in her life  She will now change to take it every 3 days for 2 weeks and then she will stop  She knows to let me know if any of the old feeling start coming back but now she feels good and is actually feeling more than she has in a while    She feels much better      Vitamin-D deficiency   Continue vitamin-D 5000 units daily          She will recheck in 3 months A1c, CBC and lipids  Next colonoscopy is December 2019   Patient's DEXA normal in July 2020     Her mammogram is next due 7/2019     Next Pap test October 2018    Patient will try to get the CT scan of her lungs for smoking history done            Health Maintenance   Topic Date Due    DM Eye Exam  04/30/2017    Diabetic Foot Exam  04/13/2018    INFLUENZA VACCINE  09/01/2018    HEMOGLOBIN A1C  02/08/2019    MAMMOGRAM  07/24/2019    URINE MICROALBUMIN  08/08/2019    PAP SMEAR  10/10/2019    CRC Screening: Colonoscopy  12/23/2019    DTaP,Tdap,and Td Vaccines (2 - Tdap) 05/03/2020    DXA SCAN  07/10/2020    Pneumococcal PPSV23 Medium Risk Adult  Completed         Problem List as of 8/23/2018 Reviewed: 4/3/2018 11:05 AM by Kiersten Ralph, DO    Benign colon polyp    Depression    Diabetes mellitus, type 2 ([facility] [address] )    Essential hypertension    Hyperlipidemia    Insomnia    Psoriasis    Uncomplicated alcohol abuse    Vitamin D deficiency            Subjective:   Chief Complaint   Patient presents with   [place] Depression    Diabetes    Hyperlipidemia    Hypertension    Vitamin D Deficiency     Patient is here for diabetes recheck  Since last visit she read on side effects on her citalopram and started to wean herself by taking every other day for about 6 weeks  As a result she noticed that the redness around her eyes, Palms and soles are going away  This is a rare but real side effect  It is also related to some psoriasis  Patient is smoking, she is actually smoking less than normal because of going back to work  Alcohol still good and stable        patient ID: Blade Smyth is a 65 y o  female      Past Medical History:   Diagnosis Date    Insomnia     Last assessed: 4/13/17     Past Surgical History:   Procedure Laterality Date    BREAST LUMPECTOMY       Family History   Problem Relation Age of Onset   [place] Emphysema Mother     Alzheimer's disease Father     Hypertension Father     Alcohol abuse Brother     Substance Abuse Neg Hx      Social History   Substance Use Topics    Smoking status: Current Every Day Smoker     Packs/day: 1 50     Types: Cigarettes    Smokeless tobacco: Never Used    Alcohol use Yes      Comment: Social Per [employer]: 5-6 mixzed drinks every other weekend     History   Smoking Status    Current Every Day Smoker    Packs/day: 1 50    Types: Cigarettes   Smokeless Tobacco    Never Used        MED LIST WAS REVIEWED AND UPDATED       ROS    Constitutional  No fever chills no fatigue no weight loss no weight gain    Mental status  No anxiety or depression and no sleep disturbances no changes in personality or emotional problems no suicidal or homicidal ideations    Eyes  No eye pain no red eyes no visual disturbances no discharge no eye itch    ENT  No earache no hearing loss nasal discharge no sore throat no hoarseness no postnasal drip     Cardio  No chest pain no palpitations no leg edema no claudication no dyspnea on exertion no nocturnal dyspnea    Respiratory  No shortness of breath or wheeze no cough no orthopnea no dyspnea on exertion no hemoptysis no sputum production    GI  No abdominal pain no nausea no vomiting no diarrhea or constipation no bloody stools no change in bowel habits no change in weight      no dysuria hematuria no pyuria no incontinence no pelvic pain    Musculoskeletal  No myalgias arthralgias no joint swelling or stiffness no limb pain or swelling    Skin  No rashes or lesions no itchiness and no wounds    Neuro  No headache dizziness lightheadedness syncope numbness paresthesias or confusion    Heme  No swollen glands no easy bruising          Objective:      VITALS:  Wt Readings from Last 3 Encounters:   08/23/18 107 kg (235 lb 9 6 oz)   04/03/18 107 kg (236 lb 4 8 oz)   02/06/18 106 kg (233 lb)     BP Readings from Last 3 Encounters:   08/23/18 138/70   04/03/18 110/70   02/06/18 122/88     Pulse Readings from Last 3 Encounters:   08/23/18 100   04/03/18 72   02/06/18 60     Body mass index is 43 09 kg/m²      Laboratory Results:   Lab Results   Component Value Date    WBC 10 4 08/08/2018    WBC 10 6 01/09/2017    WBC NONE SEEN 01/09/2017    HGB 16 1 (H) 08/08/2018    HGB 15 0 01/09/2017    HGB 14 9 01/03/2015    HCT 48 8 (H) 08/08/2018    HCT 46 9 (H) 01/09/2017    HCT 45 3 01/03/2015    MCV 97 0 08/08/2018    MCV 96 7 01/09/2017    MCV 98 01/03/2015     08/08/2018     01/09/2017     01/03/2015     Lab Results   Component Value Date     01/09/2017     01/03/2015    K 4 4 01/09/2017    K 4 0 01/03/2015    CL 99 01/09/2017    CL 98 (L) 01/03/2015    CO2 30 01/09/2017    CO2 29 01/03/2015    BUN 12 08/08/2018    BUN 13 01/09/2017    BUN 12 01/03/2015     Lab Results   Component Value Date    GLUCOSE 102 01/03/2015    ALT 10 04/10/2017    AST 12 04/10/2017    ALKPHOS 74 08/08/2018    CALCIUM 9 4 08/08/2018     No results found for: TSH    Lipid Profile:   Lab Results   Component Value Date    CHOL 198 04/10/2017    CHOL 332 (H) 01/09/2017    CHOL 188 02/24/2016     Lab Results   Component Value Date    HDL 46 (L) 08/08/2018    HDL 49 04/10/2017    HDL 49 01/09/2017     No results found for: [address]  Lab Results   Component Value Date    TRIG 214 (H) 08/08/2018    TRIG 188 (H) 04/10/2017    TRIG 241 (H) 01/09/2017       Diabetic labs (if applicable)  Lab Results   Component Value Date    HGBA1C 6 4 (H) 08/08/2018    HGBA1C 6 0 (H) 03/28/2018    HGBA1C 6 0 (H) 11/15/2017     Lab Results   Component Value Date    CREATININE 1 01 (H) 08/08/2018          Physical Exam  Constitutional  Appears healthy, Looks well, Appearance consistent with age    Mental Status  Alert, Oriented, Cooperative, Memory function normal , clean, and reasonable    Neck  No neck mass, No thyromegaly, Good carotid upstrokes bilaterally, trachea midline positive click    Respiratory  Breath sounds normal, No rales, No rhonchi, No wheezing, normal palpation    Cardiac   Regular rhythm without ectopy or murmur no S3-S4, no heave lift or thrill to palpation    Vascular  No leg edema, No pedal edema    Muscular skeletal  No clubbing cyanosis , muscle tone normal    Skin  The erythema around patient's poems and soles is going way and there is some residual left

## 2018-08-23 ENCOUNTER — OFFICE VISIT (OUTPATIENT)
Dept: FAMILY MEDICINE CLINIC | Facility: CLINIC | Age: 63
End: 2018-08-23
Payer: COMMERCIAL

## 2018-08-23 VITALS
SYSTOLIC BLOOD PRESSURE: 138 MMHG | HEIGHT: 62 IN | BODY MASS INDEX: 43.36 KG/M2 | DIASTOLIC BLOOD PRESSURE: 70 MMHG | WEIGHT: 235.6 LBS | HEART RATE: 100 BPM | RESPIRATION RATE: 18 BRPM

## 2018-08-23 DIAGNOSIS — E11.9 TYPE 2 DIABETES MELLITUS WITHOUT COMPLICATION, WITHOUT LONG-TERM CURRENT USE OF INSULIN (HCC): ICD-10-CM

## 2018-08-23 DIAGNOSIS — F32.9 REACTIVE DEPRESSION: ICD-10-CM

## 2018-08-23 DIAGNOSIS — G47.00 INSOMNIA, UNSPECIFIED TYPE: ICD-10-CM

## 2018-08-23 DIAGNOSIS — I10 ESSENTIAL HYPERTENSION: Primary | ICD-10-CM

## 2018-08-23 DIAGNOSIS — F10.10 UNCOMPLICATED ALCOHOL ABUSE: ICD-10-CM

## 2018-08-23 DIAGNOSIS — D75.1 ERYTHROCYTOSIS: ICD-10-CM

## 2018-08-23 DIAGNOSIS — E78.2 MIXED HYPERLIPIDEMIA: ICD-10-CM

## 2018-08-23 DIAGNOSIS — E55.9 VITAMIN D DEFICIENCY: ICD-10-CM

## 2018-08-23 PROCEDURE — 90682 RIV4 VACC RECOMBINANT DNA IM: CPT

## 2018-08-23 PROCEDURE — 3075F SYST BP GE 130 - 139MM HG: CPT | Performed by: FAMILY MEDICINE

## 2018-08-23 PROCEDURE — 99214 OFFICE O/P EST MOD 30 MIN: CPT | Performed by: FAMILY MEDICINE

## 2018-08-23 PROCEDURE — 3078F DIAST BP <80 MM HG: CPT | Performed by: FAMILY MEDICINE

## 2018-08-23 NOTE — PATIENT INSTRUCTIONS
Diabetes  A1c 6 4 from 6 0  Patient will clean up her diet tried exercise and we will recheck this in 3 months  She will continue her metformin 500 1 pill daily     Hyperlipidemia  We need to get patient's  or less because of her diabetes  She is going to try to clean up her cheese habit and her fatty meat and we will recheck this in 3 months     Hypertension   Blood pressure is excellent with metoprolol  continue the same      Depression  At the time patient started citalopram a lot of things were going on in her life  She will now change to take it every 3 days for 2 weeks and then she will stop  She knows to let me know if any of the old feeling start coming back but now she feels good and is actually feeling more than she has in a while    She feels much better      Vitamin-D deficiency   Continue vitamin-D 5000 units daily          She will recheck in 3 months A1c, CBC and lipids  Next colonoscopy is December 2019   Patient's DEXA normal in July 2020     Her mammogram is next due 7/2019     Next Pap test October 2018    Patient will try to get the CT scan of her lungs for smoking history done      Patient will come here in October for Pap  Otherwise she will recheck in 3 months for her diabetes recheck in blood work

## 2018-08-27 DIAGNOSIS — E11.9 TYPE 2 DIABETES MELLITUS WITHOUT COMPLICATION, WITHOUT LONG-TERM CURRENT USE OF INSULIN (HCC): Primary | ICD-10-CM

## 2018-08-27 LAB
LEFT EYE DIABETIC RETINOPATHY: NORMAL
RIGHT EYE DIABETIC RETINOPATHY: NORMAL

## 2018-08-27 PROCEDURE — 3072F LOW RISK FOR RETINOPATHY: CPT | Performed by: FAMILY MEDICINE

## 2018-08-27 RX ORDER — METFORMIN HYDROCHLORIDE 500 MG/1
TABLET, EXTENDED RELEASE ORAL
Qty: 180 TABLET | Refills: 0 | Status: SHIPPED | OUTPATIENT
Start: 2018-08-27 | End: 2018-11-25 | Stop reason: SDUPTHER

## 2018-09-11 ENCOUNTER — OFFICE VISIT (OUTPATIENT)
Dept: FAMILY MEDICINE CLINIC | Facility: CLINIC | Age: 63
End: 2018-09-11
Payer: COMMERCIAL

## 2018-09-11 VITALS
SYSTOLIC BLOOD PRESSURE: 126 MMHG | BODY MASS INDEX: 43.67 KG/M2 | DIASTOLIC BLOOD PRESSURE: 80 MMHG | TEMPERATURE: 98.2 F | RESPIRATION RATE: 16 BRPM | HEART RATE: 60 BPM | WEIGHT: 237.3 LBS | HEIGHT: 62 IN

## 2018-09-11 DIAGNOSIS — J20.9 ACUTE BRONCHITIS, UNSPECIFIED ORGANISM: Primary | ICD-10-CM

## 2018-09-11 DIAGNOSIS — R05.9 COUGH: ICD-10-CM

## 2018-09-11 PROCEDURE — 3008F BODY MASS INDEX DOCD: CPT | Performed by: FAMILY MEDICINE

## 2018-09-11 PROCEDURE — 99214 OFFICE O/P EST MOD 30 MIN: CPT | Performed by: FAMILY MEDICINE

## 2018-09-11 RX ORDER — OFLOXACIN 3 MG/ML
SOLUTION/ DROPS OPHTHALMIC
COMMUNITY
Start: 2018-07-26 | End: 2019-06-25 | Stop reason: ALTCHOICE

## 2018-09-11 NOTE — PROGRESS NOTES
Assessment/Plan:    Acute bronchitis most likely viral  I feel that patient is on her way out since her upper respiratory track is clear  We will 1st try Proventil/albuterol tablets 1 tablet 3 times a day for her cough  She knows it might make her little shaky  If she is not improving over the next 48-72 hours she will call and we will give her prednisone  Were trying to avoid it in order not to increase her blood sugars for her diabetes    Recheck as scheduled or as needed         Subjective:   Sunshine Valenzuela is a 65 y  o female  Chief Complaint   Patient presents with    Poss bronchitis     About a week ago yesterday patient started with a runny nose and felt like she had a cold  She was coughing and with sometimes have coughing spells  Now she feels raspy substernally, she has some sputum but not much, no fever no chills  Past Medical History:   Diagnosis Date    Insomnia     Last assessed: 4/13/17     Social History   Substance Use Topics    Smoking status: Current Every Day Smoker     Packs/day: 1 50     Types: Cigarettes    Smokeless tobacco: Never Used    Alcohol use Yes      Comment: Social Per [employer]: 5-6 mixzed drinks every other weekend     Family History   Problem Relation Age of Onset    Emphysema Mother     Alzheimer's disease Father     Hypertension Father     Alcohol abuse Brother     Substance Abuse Neg Hx        MEDICATIONS REVIEWED AND UPDATED    Rest Of 10 Point Review Of System Negative    Objective:    Vitals:    09/11/18 1105   BP: 126/80   Pulse: 60   Resp: 16   Temp: 98 2 °F (36 8 °C)     Body mass index is 43 75 kg/m²      Physical Exam   Constitutional  Patient if appears fatigued but certainly not toxic    Mental Status  Alert, Oriented, Cooperative, Memory function normal , clean, and reasonable    HEENT  TMs are perfect turbinates open not even red pharynx benign    Neck  No neck mass, No thyromegaly, Good carotid upstrokes bilaterally, trachea midline positive click    Respiratory  Posterior mid to end expiratory wheezes and scattered rhonchi from the mid lung field to the lower base, no tactile fremitus    Cardiac   Regular rhythm without ectopy or murmur no S3-S4, no heave lift or thrill to palpation    Vascular  No leg edema, No pedal edema    Muscular skeletal  No clubbing cyanosis , muscle tone normal    Skin  No appreciable rashes or abnormal appearing lesions

## 2018-09-11 NOTE — PATIENT INSTRUCTIONS
Acute bronchitis most likely viral  I feel that patient is on her way out since her upper respiratory track is clear  We will 1st try Proventil/albuterol tablets 1 tablet 3 times a day for her cough    She knows it might make her little shaky  If she is not improving over the next 48-72 hours she will call and we will give her prednisone  Were trying to avoid it in order not to increase her blood sugars for her diabetes    Recheck as scheduled or as needed

## 2018-10-11 ENCOUNTER — ANNUAL EXAM (OUTPATIENT)
Dept: FAMILY MEDICINE CLINIC | Facility: CLINIC | Age: 63
End: 2018-10-11
Payer: COMMERCIAL

## 2018-10-11 VITALS
DIASTOLIC BLOOD PRESSURE: 72 MMHG | RESPIRATION RATE: 16 BRPM | BODY MASS INDEX: 43.1 KG/M2 | HEART RATE: 80 BPM | WEIGHT: 234.2 LBS | SYSTOLIC BLOOD PRESSURE: 122 MMHG | HEIGHT: 62 IN

## 2018-10-11 DIAGNOSIS — Z01.419 GYNECOLOGIC EXAM NORMAL: ICD-10-CM

## 2018-10-11 DIAGNOSIS — Z01.419 ENCOUNTER FOR GYNECOLOGICAL EXAMINATION WITH PAPANICOLAOU SMEAR OF CERVIX: Primary | ICD-10-CM

## 2018-10-11 DIAGNOSIS — Z12.4 PAP SMEAR FOR CERVICAL CANCER SCREENING: ICD-10-CM

## 2018-10-11 PROCEDURE — 99396 PREV VISIT EST AGE 40-64: CPT | Performed by: FAMILY MEDICINE

## 2018-10-11 NOTE — PROGRESS NOTES
Assessment/plan;    Patient's Pap done  Mammogram up-to-date  Next Pap in 2 years    Patient here for her routine gyn exam  She is postmenopausal  She had no children, 1 pregnancy with an elective AB when she was in her 22  y        Health Maintenance   Topic Date Due    Diabetic Foot Exam  04/13/2018    HEMOGLOBIN A1C  02/08/2019    MAMMOGRAM  07/24/2019    URINE MICROALBUMIN  08/08/2019    DM Eye Exam  08/27/2019    PAP SMEAR  10/10/2019    CRC Screening: Colonoscopy  12/23/2019    DTaP,Tdap,and Td Vaccines (2 - Tdap) 05/03/2020    DXA SCAN  07/10/2020    INFLUENZA VACCINE  Completed    Pneumococcal PPSV23 Medium Risk Adult  Completed       Gynecologic History  No LMP recorded   Patient is postmenopausal      Contraception: post menopausal status  Next  mammogram:  7/24/2019  Last DXA:  July 2020  Next colon if >49 y/o:  December 2019     Social History   Substance Use Topics    Smoking status: Current Every Day Smoker     Packs/day: 1 50     Types: Cigarettes    Smokeless tobacco: Never Used    Alcohol use Yes      Comment: Social Per [employer]: 5-6 mixzed drinks every other weekend     Family History   Problem Relation Age of Onset    Emphysema Mother     Alzheimer's disease Father     Hypertension Father     Alcohol abuse Brother     Substance Abuse Neg Hx      Past Surgical History:   Procedure Laterality Date    BREAST LUMPECTOMY       Current Outpatient Prescriptions   Medication Sig Dispense Refill    albuterol 2 mg tablet Take 1 tablet (2 mg total) by mouth 3 (three) times a day 30 tablet 0    hydrocortisone (PROCTOZONE-HC) 2 5 % rectal cream Insert into the rectum      metFORMIN (GLUCOPHAGE-XR) 500 mg 24 hr tablet TAKE 1 TABLET BY MOUTH WITH BREAKFAST AND TAKE 1 TABLET WITH AN EVENING MEAL 180 tablet 0    metoprolol succinate (TOPROL-XL) 50 mg 24 hr tablet TAKE 1 TABLET EVERY DAY 90 tablet 3    ofloxacin (OCUFLOX) 0 3 % ophthalmic solution       rosuvastatin (CRESTOR) 10 MG tablet TAKE ONE TABLET EVERY EVENING 90 tablet 1    VITAMIN D, ERGOCALCIFEROL, PO Take by mouth  No current facility-administered medications for this visit             Review of Systems   Constitutional  No fatigue, No weight loss, No weight gain, No fever, No chills    Respiratory  No dyspnea, No cough, No hemoptysis, No wheezing, No pleuritic pain    Cardiovascular  No chest pain, No palpitations, No arrhythmia, No orthopnea, No nocturnal dyspnea, No edema, No claudication    Breasts (R,L)  No discharge from nipple, No breast tenderness, No breast mass    Gastrointestinal  No loss of appetite, No dysphagia, No abdominal pain, No nausea, No vomiting, No change in bowel habits, No diarrhea, No constipation, No blood in stool    Genitourinary, Female  No increased frequency of urination, No dysuria, No hematuria, No nocturia, No urinary incontinence, No vaginal discharge, No abnormal vaginal bleeding, No pelvic pain, No menstrual problem, No menopausal problem    Neurologic  No headache, No dizziness, No lightheadedness, No syncope, No vertigo, No weakness, No numbness, No paresthesia, No tremor    Psychiatric  No difficulty sleeping, No mood swings, Not feeling anxious, Not feeling depressed, No confusion, No memory loss    Muscular skeletal  Myalgias, arthralgias, no joint swelling or stiffness, no limb pain or swelling    Heme  No swollen glands, no easy bruising    Objective:    Vitals:    10/11/18 0801   BP: 122/72   Pulse: 80   Resp: 16       Lab Results   Component Value Date    WBC 10 4 08/08/2018    HGB 16 1 (H) 08/08/2018    HCT 48 8 (H) 08/08/2018     08/08/2018    CHOL 198 04/10/2017    TRIG 214 (H) 08/08/2018    HDL 46 (L) 08/08/2018    ALT 10 04/10/2017    AST 12 04/10/2017     01/09/2017    K 4 4 01/09/2017    CL 99 08/08/2018    CREATININE 1 01 (H) 08/08/2018    BUN 12 08/08/2018    CO2 30 08/08/2018    INR 0 93 01/03/2015    HGBA1C 6 4 (H) 08/08/2018     BP Readings from Last 3 Encounters:   10/11/18 122/72   09/11/18 126/80   08/23/18 138/70     Ht Readings from Last 3 Encounters:   10/11/18 5' 2" (1 575 m)   09/11/18 5' 1 75" (1 568 m)   08/23/18 5' 2" (1 575 m)     @lastweight(3)@  BMI Readings from Last 3 Encounters:   10/11/18 42 84 kg/m²   09/11/18 43 75 kg/m²   08/23/18 43 09 kg/m²          Physical Exam  Constitutional  Appears well, Looks well, Appearance consistent with age    Mental Status  Alert, Cooperative, oriented to time person and place, recent and remote memory intact, mood and affect normal , patient is reasonable    Neck  No neck mass, No thyromegaly, No thyroid nodule, No thyroid tenderness    Respiratory  Respiratory effort normal, Breath sounds normal, No rales, No rhonchi, No wheezing     Cardiac  Heart rate normal, Heart rhythm normal, Heart sounds normal, No heart murmur    Vascular  Carotid pulse normal, No carotid bruit, No varicose veins, No leg edema    Breasts (R,L)  No discharge from nipple, No breast tenderness, No breast mass, No nipple retraction, No skin changes    Gastrointestinal  Bowel sounds normal, Abdomen soft, No hepatomegaly, No splenomegaly, No abdominal tenderness, No abdominal mass, No hernia, No hemorrhoids    Genitourinary, Female  Vulva normal, No erythema of urethra    Genitourinary, Female  Difficult pap due to habitus  Vulva normal, No erythema of vulva, Vaginal mucosa normal, No vaginal discharge, No lesion of urethra, No urethral discharge, No bladder distention, No bladder tenderness, Cervix normal, No cervical inflammation, No cervical lesion, No cervical discharge, No cervical tenderness, Uterus normal size, No uterine tenderness, No adnexal tenderness, No adnexal mass, No pelvic mass    Lymphatics  No axillary lymphadenopathy, No inguinal lymphadenopathy    Skin  Pattern of hair growth normal, No skin rash, No abnormal skin lesion, No acne

## 2018-10-14 DIAGNOSIS — F41.9 ANXIETY: ICD-10-CM

## 2018-10-14 RX ORDER — CITALOPRAM 20 MG/1
TABLET ORAL
Qty: 90 TABLET | Refills: 1 | Status: SHIPPED | OUTPATIENT
Start: 2018-10-14 | End: 2019-10-18 | Stop reason: SDUPTHER

## 2018-10-16 LAB
CLINICAL INFO: NORMAL
DATE PREVIOUS BX: NORMAL
LMP START DATE: NORMAL
SL AMB PREV. PAP:: NORMAL
SPECIMEN SOURCE CVX/VAG CYTO: NORMAL

## 2018-11-20 PROBLEM — E66.01 MORBID OBESITY WITH BMI OF 40.0-44.9, ADULT (HCC): Status: ACTIVE | Noted: 2018-11-20

## 2018-11-25 DIAGNOSIS — E11.9 TYPE 2 DIABETES MELLITUS WITHOUT COMPLICATION, WITHOUT LONG-TERM CURRENT USE OF INSULIN (HCC): ICD-10-CM

## 2018-11-25 RX ORDER — METFORMIN HYDROCHLORIDE 500 MG/1
TABLET, EXTENDED RELEASE ORAL
Qty: 180 TABLET | Refills: 0 | Status: SHIPPED | OUTPATIENT
Start: 2018-11-25 | End: 2019-03-07 | Stop reason: SDUPTHER

## 2019-01-11 DIAGNOSIS — E78.2 MIXED HYPERLIPIDEMIA: ICD-10-CM

## 2019-01-11 RX ORDER — ROSUVASTATIN CALCIUM 10 MG/1
TABLET, COATED ORAL
Qty: 90 TABLET | Refills: 1 | Status: SHIPPED | OUTPATIENT
Start: 2019-01-11 | End: 2019-02-18

## 2019-01-12 ENCOUNTER — TELEPHONE (OUTPATIENT)
Dept: FAMILY MEDICINE CLINIC | Facility: CLINIC | Age: 64
End: 2019-01-12

## 2019-01-12 NOTE — TELEPHONE ENCOUNTER
Left message to patient, regarding her refills on atorvastatin, per Julio C Bhat she needs to recheck her Lipids , and schedule an appointment, to discuss change in medication with Dr CONTRERAS

## 2019-01-12 NOTE — TELEPHONE ENCOUNTER
Pt called stating she will have the BW done in the next couples days   Pt also rescheduled earlier with Dr CONTRERAS

## 2019-02-07 LAB
BASOPHILS # BLD AUTO: 42 CELLS/UL (ref 0–200)
BASOPHILS NFR BLD AUTO: 0.4 %
CHOLEST SERPL-MCNC: 213 MG/DL
CHOLEST/HDLC SERPL: 4.4 (CALC)
EOSINOPHIL # BLD AUTO: 159 CELLS/UL (ref 15–500)
EOSINOPHIL NFR BLD AUTO: 1.5 %
ERYTHROCYTE [DISTWIDTH] IN BLOOD BY AUTOMATED COUNT: 13.1 % (ref 11–15)
EST. AVERAGE GLUCOSE BLD GHB EST-MCNC: 158 MG/DL (CALC)
HBA1C MFR BLD: 6.6 % OF TOTAL HGB
HCT VFR BLD AUTO: 47 % (ref 35–45)
HDLC SERPL-MCNC: 48 MG/DL
HGB BLD-MCNC: 16.1 G/DL (ref 11.7–15.5)
LDLC SERPL CALC-MCNC: 134 MG/DL (CALC)
LYMPHOCYTES # BLD AUTO: 2226 CELLS/UL (ref 850–3900)
LYMPHOCYTES NFR BLD AUTO: 21 %
MCH RBC QN AUTO: 32.7 PG (ref 27–33)
MCHC RBC AUTO-ENTMCNC: 34.3 G/DL (ref 32–36)
MCV RBC AUTO: 95.5 FL (ref 80–100)
MONOCYTES # BLD AUTO: 541 CELLS/UL (ref 200–950)
MONOCYTES NFR BLD AUTO: 5.1 %
NEUTROPHILS # BLD AUTO: 7632 CELLS/UL (ref 1500–7800)
NEUTROPHILS NFR BLD AUTO: 72 %
NONHDLC SERPL-MCNC: 165 MG/DL (CALC)
PLATELET # BLD AUTO: 263 THOUSAND/UL (ref 140–400)
PMV BLD REES-ECKER: 11.3 FL (ref 7.5–12.5)
RBC # BLD AUTO: 4.92 MILLION/UL (ref 3.8–5.1)
TRIGL SERPL-MCNC: 172 MG/DL
WBC # BLD AUTO: 10.6 THOUSAND/UL (ref 3.8–10.8)

## 2019-02-18 ENCOUNTER — OFFICE VISIT (OUTPATIENT)
Dept: FAMILY MEDICINE CLINIC | Facility: CLINIC | Age: 64
End: 2019-02-18
Payer: COMMERCIAL

## 2019-02-18 VITALS
RESPIRATION RATE: 16 BRPM | WEIGHT: 232.6 LBS | BODY MASS INDEX: 42.8 KG/M2 | SYSTOLIC BLOOD PRESSURE: 130 MMHG | HEART RATE: 76 BPM | HEIGHT: 62 IN | DIASTOLIC BLOOD PRESSURE: 80 MMHG

## 2019-02-18 DIAGNOSIS — E78.2 MIXED HYPERLIPIDEMIA: ICD-10-CM

## 2019-02-18 DIAGNOSIS — F32.9 REACTIVE DEPRESSION: ICD-10-CM

## 2019-02-18 DIAGNOSIS — E66.01 MORBID OBESITY WITH BMI OF 40.0-44.9, ADULT (HCC): ICD-10-CM

## 2019-02-18 DIAGNOSIS — E11.40 TYPE 2 DIABETES MELLITUS WITH DIABETIC NEUROPATHY, UNSPECIFIED WHETHER LONG TERM INSULIN USE (HCC): Primary | ICD-10-CM

## 2019-02-18 DIAGNOSIS — E55.9 VITAMIN D DEFICIENCY: ICD-10-CM

## 2019-02-18 DIAGNOSIS — R42 DIZZINESS: ICD-10-CM

## 2019-02-18 DIAGNOSIS — I10 ESSENTIAL HYPERTENSION: ICD-10-CM

## 2019-02-18 DIAGNOSIS — F10.10 UNCOMPLICATED ALCOHOL ABUSE: ICD-10-CM

## 2019-02-18 PROCEDURE — 3008F BODY MASS INDEX DOCD: CPT | Performed by: FAMILY MEDICINE

## 2019-02-18 PROCEDURE — 3079F DIAST BP 80-89 MM HG: CPT | Performed by: FAMILY MEDICINE

## 2019-02-18 PROCEDURE — 3075F SYST BP GE 130 - 139MM HG: CPT | Performed by: FAMILY MEDICINE

## 2019-02-18 PROCEDURE — 99215 OFFICE O/P EST HI 40 MIN: CPT | Performed by: FAMILY MEDICINE

## 2019-02-18 RX ORDER — ATORVASTATIN CALCIUM 40 MG/1
40 TABLET, FILM COATED ORAL DAILY
Qty: 90 TABLET | Refills: 3 | Status: SHIPPED | OUTPATIENT
Start: 2019-02-18 | End: 2019-10-17 | Stop reason: SDUPTHER

## 2019-02-18 NOTE — PROGRESS NOTES
ASSESSMENT/PLAN:    Dizziness  Sounds like labyrinthitis  Patient had a cold 3-4 weeks prior  Seems to be wearing itself out just as today goes on    Patient understands that it could come and go a few times over the next couple weeks    Type 2 diabetes  A1c 6 6 from 6 4  We will keep metformin once daily  We will recheck this in 3 months time  If it keeps going up we will put the metformin back to twice daily  Patient is to keep exercising maybe walk a little bit faster  Patient is to watch her diet    Hyperlipidemia  Patient's insurance will not pay for her Crestor anymore so we will change it to atorvastatin  She will put it in place of where the Crestor was  We will check her cholesterol liver test before next visit    Hypertension  Blood pressure is good with diet exercise and metoprolol    Depression  Patient will continue with citalopram  Patient is doing well with 1 pill every other day instead of daily     Vitamin-D deficiency   Continue vitamin-D 5000 units daily         Recheck in 3 months  A1c lipids and liver test prior         Health Maintenance   Topic Date Due    Hepatitis C Screening  1955    BMI: Followup Plan  12/03/1974    HEPATITIS B VACCINES (1 of 3 - Risk 3-dose series) 12/03/1975    Diabetic Foot Exam  04/13/2018    MAMMOGRAM  07/24/2019    HEMOGLOBIN A1C  08/06/2019    URINE MICROALBUMIN  08/08/2019    DM Eye Exam  08/27/2019    BMI: Adult  10/11/2019    CRC Screening: Colonoscopy  12/23/2019    DTaP,Tdap,and Td Vaccines (2 - Tdap) 05/03/2020    DXA SCAN  07/10/2020    PAP SMEAR  10/11/2021    INFLUENZA VACCINE  Completed    Pneumococcal PPSV23 Medium Risk Adult  Completed         Problem List as of 2/18/2019 Reviewed: 2/18/2019 12:01 PM by Alexsandra Augustine, DO    Benign colon polyp    Diabetes mellitus, type 2 ([facility] [address] )    Essential hypertension    Insomnia    Mixed hyperlipidemia    Morbid obesity with BMI of 40 0-44 9, adult ([facility] [address] )    Psoriasis    Reactive depression Uncomplicated alcohol abuse    Vitamin D deficiency            Subjective:   Chief Complaint   Patient presents with    Diabetes    Hyperlipidemia    Vitamin D Deficiency    Dizziness     Patient is here for her regular recheck on her diabetes and cholesterol but she is also here because last night she woke up and felt like she was dizzy but she is having a hard time explaining with dizzy means  When she got up she felt like she needed to hold onto the wall or she would fall  She is getting better as the day goes on and is better since she has been in the office  At 1 point when she was lying in bed looked up at the corner she felt like the whole wall was moving and turning then coming back to its original position  Otherwise she is doing very well and has very controlled drinking regarding the alcohol  She is working on smoking but now that she is back to work she is smoking less  Patient is walking a few times a week  patient ID: Felecia An is a 59 y o  female      Past Medical History:   Diagnosis Date    Insomnia     Last assessed: 4/13/17     Past Surgical History:   Procedure Laterality Date    BREAST LUMPECTOMY      US GUIDED BREAST BIOPSY LEFT COMPLETE Left 5/26/2016     Family History   Problem Relation Age of Onset    Emphysema Mother     Alzheimer's disease Father     Hypertension Father     Alcohol abuse Brother     Substance Abuse Neg Hx      Social History     Tobacco Use    Smoking status: Current Every Day Smoker     Packs/day: 1 50     Types: Cigarettes    Smokeless tobacco: Never Used   Substance Use Topics    Alcohol use: Yes     Comment: Social Per [employer]: 5-6 mixzed drinks every other weekend    Drug use: No     Social History     Tobacco Use   Smoking Status Current Every Day Smoker    Packs/day: 1 50    Types: Cigarettes   Smokeless Tobacco Never Used        MED LIST WAS REVIEWED AND UPDATED       ROS    Constitutional  No fever chills no fatigue no weight loss no weight gain    Mental status  No anxiety or depression and no sleep disturbances no changes in personality or emotional problems no suicidal or homicidal ideations    Eyes  No eye pain no red eyes no visual disturbances no discharge no eye itch    ENT  No earache no hearing loss nasal discharge no sore throat no hoarseness no postnasal drip     Cardio  No chest pain no palpitations no leg edema no claudication no dyspnea on exertion no nocturnal dyspnea    Respiratory  No shortness of breath or wheeze no cough no orthopnea no dyspnea on exertion no hemoptysis no sputum production    GI  No abdominal pain no nausea no vomiting no diarrhea or constipation no bloody stools no change in bowel habits no change in weight      no dysuria hematuria no pyuria no incontinence no pelvic pain    Musculoskeletal  No myalgias arthralgias no joint swelling or stiffness no limb pain or swelling    Skin  No rashes or lesions no itchiness and no wounds    Neuro  No headache dizziness lightheadedness syncope numbness paresthesias or confusion    Heme  No swollen glands no easy bruising          Objective:      VITALS:  Wt Readings from Last 3 Encounters:   02/18/19 106 kg (232 lb 9 6 oz)   10/11/18 106 kg (234 lb 3 2 oz)   09/11/18 108 kg (237 lb 4 8 oz)     BP Readings from Last 3 Encounters:   02/18/19 130/80   10/11/18 122/72   09/11/18 126/80     Pulse Readings from Last 3 Encounters:   02/18/19 76   10/11/18 80   09/11/18 60     Body mass index is 42 54 kg/m²      Laboratory Results:   Lab Results   Component Value Date    WBC 10 6 02/06/2019    WBC 10 4 08/08/2018    WBC 10 6 01/09/2017    WBC NONE SEEN 01/09/2017    HGB 16 1 (H) 02/06/2019    HGB 16 1 (H) 08/08/2018    HGB 15 0 01/09/2017    HCT 47 0 (H) 02/06/2019    HCT 48 8 (H) 08/08/2018    HCT 46 9 (H) 01/09/2017    MCV 95 5 02/06/2019    MCV 97 0 08/08/2018    MCV 96 7 01/09/2017     02/06/2019     08/08/2018     01/09/2017     Lab Results Component Value Date     01/09/2017     01/03/2015    K 4 5 08/08/2018    K 4 4 01/09/2017    K 4 0 01/03/2015    CL 99 08/08/2018    CL 99 01/09/2017    CL 98 (L) 01/03/2015    CO2 30 08/08/2018    CO2 30 01/09/2017    CO2 29 01/03/2015    BUN 12 08/08/2018    BUN 13 01/09/2017    BUN 12 01/03/2015     Lab Results   Component Value Date    GLUCOSE 102 01/03/2015    ALT 21 08/08/2018    AST 19 08/08/2018    ALKPHOS 74 08/08/2018    CALCIUM 9 4 08/08/2018     No results found for: TSH    Lipid Profile:   Lab Results   Component Value Date    CHOL 198 04/10/2017    CHOL 332 (H) 01/09/2017    CHOL 188 02/24/2016     Lab Results   Component Value Date    HDL 48 (L) 02/06/2019    HDL 46 (L) 08/08/2018    HDL 49 04/10/2017     No results found for: [facility]  Lab Results   Component Value Date    TRIG 172 (H) 02/06/2019    TRIG 214 (H) 08/08/2018    TRIG 188 (H) 04/10/2017       Diabetic labs (if applicable)  Lab Results   Component Value Date    HGBA1C 6 6 (H) 02/06/2019    HGBA1C 6 4 (H) 08/08/2018    HGBA1C 6 0 (H) 03/28/2018     Lab Results   Component Value Date    CREATININE 0 86 01/09/2017          Physical Exam    Constitutional  Appears healthy, Looks well, Appearance consistent with age    Mental Status  Alert, Oriented, Cooperative, Memory function normal , clean, and reasonable    HEENT  Normocephalic atraumatic without facial weakness  PERRLA at 3 mm EOMI without nystagmus  Negative rapid head movement test negative skew  TMs are totally clear turbinates open pharynx benign    Neck  No neck mass, No thyromegaly, Good carotid upstrokes bilaterally, trachea midline positive click    Respiratory  Breath sounds normal, No rales, No rhonchi, No wheezing, normal palpation    Cardiac   Regular rhythm without ectopy or murmur no S3-S4, no heave lift or thrill to palpation    Vascular  No leg edema, No pedal edema    Muscular skeletal  No clubbing cyanosis , muscle tone normal    Skin  No appreciable rashes or abnormal appearing lesions      Patient's shoes and socks removed  Right Foot/Ankle   Right Foot Inspection  Skin Exam: skin normal and skin intact no dry skin, no warmth, no callus, no erythema, no maceration, no abnormal color, no pre-ulcer, no ulcer and no callus                            Sensory       Monofilament testing: intact  Vascular    The right DP pulse is 2+  The right PT pulse is 2+  Left Foot/Ankle  Left Foot Inspection  Skin Exam: skin normal and skin intactno dry skin, no warmth, no erythema, no maceration, normal color, no pre-ulcer, no ulcer and no callus                                         Sensory       Monofilament: intact  Vascular    The left DP pulse is 2+  The left PT pulse is 2+  Assign Risk Category:  No deformity present; No loss of protective sensation; No weak pulses       Risk: 0     BMI Counseling: Body mass index is 42 54 kg/m²  Discussed the patient's BMI with her  The BMI is above average  BMI counseling and education was provided to the patient  Exercise recommendations include moderate aerobic physical activity for 150 minutes/week

## 2019-02-18 NOTE — PATIENT INSTRUCTIONS
Dizziness  Sounds like labyrinthitis  Patient had a cold 3-4 weeks prior  Seems to be wearing itself out just as today goes on    Patient understands that it could come and go a few times over the next couple weeks    Type 2 diabetes  A1c 6 6 from 6 4  We will keep metformin once daily  We will recheck this in 3 months time  If it keeps going up we will put the metformin back to twice daily  Patient is to keep exercising maybe walk a little bit faster  Patient is to watch her diet    Hyperlipidemia  Patient's insurance will not pay for her Crestor anymore so we will change it to atorvastatin  She will put it in place of where the Crestor was  We will check her cholesterol liver test before next visit    Hypertension  Blood pressure is good with diet exercise and metoprolol    Depression  Patient will continue with citalopram  Patient is doing well with 1 pill every other day instead of daily     Vitamin-D deficiency   Continue vitamin-D 5000 units daily         Recheck in 3 months  A1c lipids and liver test prior

## 2019-03-07 DIAGNOSIS — E11.9 TYPE 2 DIABETES MELLITUS WITHOUT COMPLICATION, WITHOUT LONG-TERM CURRENT USE OF INSULIN (HCC): ICD-10-CM

## 2019-03-07 RX ORDER — METFORMIN HYDROCHLORIDE 500 MG/1
TABLET, EXTENDED RELEASE ORAL
Qty: 180 TABLET | Refills: 0 | Status: SHIPPED | OUTPATIENT
Start: 2019-03-07 | End: 2019-10-13 | Stop reason: SDUPTHER

## 2019-03-12 DIAGNOSIS — I10 ESSENTIAL HYPERTENSION: ICD-10-CM

## 2019-03-12 RX ORDER — METOPROLOL SUCCINATE 50 MG/1
TABLET, EXTENDED RELEASE ORAL
Qty: 90 TABLET | Refills: 3 | Status: SHIPPED | OUTPATIENT
Start: 2019-03-12 | End: 2020-03-05

## 2019-06-14 LAB
ALBUMIN SERPL-MCNC: 4.1 G/DL (ref 3.6–5.1)
ALBUMIN/GLOB SERPL: 1.5 (CALC) (ref 1–2.5)
ALP SERPL-CCNC: 84 U/L (ref 33–130)
ALT SERPL-CCNC: 16 U/L (ref 6–29)
AST SERPL-CCNC: 13 U/L (ref 10–35)
BILIRUB DIRECT SERPL-MCNC: 0.1 MG/DL
BILIRUB INDIRECT SERPL-MCNC: 0.5 MG/DL (CALC) (ref 0.2–1.2)
BILIRUB SERPL-MCNC: 0.6 MG/DL (ref 0.2–1.2)
CHOLEST SERPL-MCNC: 208 MG/DL
CHOLEST/HDLC SERPL: 4.5 (CALC)
EST. AVERAGE GLUCOSE BLD GHB EST-MCNC: 161 MG/DL (CALC)
GLOBULIN SER CALC-MCNC: 2.7 G/DL (CALC) (ref 1.9–3.7)
HBA1C MFR BLD: 6.7 % OF TOTAL HGB
HDLC SERPL-MCNC: 46 MG/DL
LDLC SERPL CALC-MCNC: 130 MG/DL (CALC)
NONHDLC SERPL-MCNC: 162 MG/DL (CALC)
PROT SERPL-MCNC: 6.8 G/DL (ref 6.1–8.1)
TRIGL SERPL-MCNC: 182 MG/DL

## 2019-06-14 PROCEDURE — 3044F HG A1C LEVEL LT 7.0%: CPT | Performed by: FAMILY MEDICINE

## 2019-06-25 ENCOUNTER — OFFICE VISIT (OUTPATIENT)
Dept: FAMILY MEDICINE CLINIC | Facility: CLINIC | Age: 64
End: 2019-06-25
Payer: COMMERCIAL

## 2019-06-25 VITALS
WEIGHT: 234.8 LBS | BODY MASS INDEX: 43.21 KG/M2 | RESPIRATION RATE: 16 BRPM | DIASTOLIC BLOOD PRESSURE: 70 MMHG | HEART RATE: 68 BPM | HEIGHT: 62 IN | SYSTOLIC BLOOD PRESSURE: 120 MMHG

## 2019-06-25 DIAGNOSIS — I10 ESSENTIAL HYPERTENSION: ICD-10-CM

## 2019-06-25 DIAGNOSIS — E11.9 TYPE 2 DIABETES MELLITUS WITHOUT COMPLICATION, WITHOUT LONG-TERM CURRENT USE OF INSULIN (HCC): Primary | ICD-10-CM

## 2019-06-25 DIAGNOSIS — E78.2 MIXED HYPERLIPIDEMIA: ICD-10-CM

## 2019-06-25 DIAGNOSIS — Z12.11 ENCOUNTER FOR SCREENING FOR MALIGNANT NEOPLASM OF COLON: ICD-10-CM

## 2019-06-25 DIAGNOSIS — Z12.39 ENCOUNTER FOR SCREENING FOR MALIGNANT NEOPLASM OF BREAST: ICD-10-CM

## 2019-06-25 DIAGNOSIS — E55.9 VITAMIN D DEFICIENCY: ICD-10-CM

## 2019-06-25 PROCEDURE — 99214 OFFICE O/P EST MOD 30 MIN: CPT | Performed by: FAMILY MEDICINE

## 2019-06-25 PROCEDURE — 3008F BODY MASS INDEX DOCD: CPT | Performed by: FAMILY MEDICINE

## 2019-06-25 PROCEDURE — 3074F SYST BP LT 130 MM HG: CPT | Performed by: FAMILY MEDICINE

## 2019-06-25 PROCEDURE — 3078F DIAST BP <80 MM HG: CPT | Performed by: FAMILY MEDICINE

## 2019-06-25 RX ORDER — EZETIMIBE 10 MG/1
10 TABLET ORAL DAILY
Qty: 90 TABLET | Refills: 3 | Status: SHIPPED | OUTPATIENT
Start: 2019-06-25 | End: 2019-10-17

## 2019-07-15 ENCOUNTER — TRANSCRIBE ORDERS (OUTPATIENT)
Dept: ADMINISTRATIVE | Facility: HOSPITAL | Age: 64
End: 2019-07-15

## 2019-07-15 DIAGNOSIS — Z00.00 ROUTINE ADULT HEALTH MAINTENANCE: Primary | ICD-10-CM

## 2019-08-19 ENCOUNTER — HOSPITAL ENCOUNTER (OUTPATIENT)
Dept: BONE DENSITY | Facility: IMAGING CENTER | Age: 64
Discharge: HOME | End: 2019-08-19
Payer: COMMERCIAL

## 2019-08-19 VITALS — BODY MASS INDEX: 42.33 KG/M2 | WEIGHT: 230 LBS | HEIGHT: 62 IN

## 2019-08-19 DIAGNOSIS — Z12.39 ENCOUNTER FOR SCREENING FOR MALIGNANT NEOPLASM OF BREAST: ICD-10-CM

## 2019-08-19 PROCEDURE — 77067 SCR MAMMO BI INCL CAD: CPT

## 2019-09-24 LAB
LEFT EYE DIABETIC RETINOPATHY: NORMAL
RIGHT EYE DIABETIC RETINOPATHY: NORMAL

## 2019-10-08 LAB
CHOLEST SERPL-MCNC: 186 MG/DL
CHOLEST/HDLC SERPL: 4.3 (CALC)
HBA1C MFR BLD: 6.7 % OF TOTAL HGB
HDLC SERPL-MCNC: 43 MG/DL
LDLC SERPL CALC-MCNC: 113 MG/DL (CALC)
NONHDLC SERPL-MCNC: 143 MG/DL (CALC)
TRIGL SERPL-MCNC: 183 MG/DL

## 2019-10-13 DIAGNOSIS — E11.9 TYPE 2 DIABETES MELLITUS WITHOUT COMPLICATION, WITHOUT LONG-TERM CURRENT USE OF INSULIN (HCC): ICD-10-CM

## 2019-10-13 RX ORDER — METFORMIN HYDROCHLORIDE 500 MG/1
TABLET, EXTENDED RELEASE ORAL
Qty: 180 TABLET | Refills: 0 | Status: SHIPPED | OUTPATIENT
Start: 2019-10-13 | End: 2019-10-17 | Stop reason: SDUPTHER

## 2019-10-14 NOTE — PROGRESS NOTES
ASSESSMENT/PLAN:    Type 2 diabetes  A1c is the same 6 7  Keep metformin 500 twice daily 1 with breakfast 1 with evening meal  A1c goes to 7 we will have to double these pills    Hyperlipidemia  We will increase atorvastatin from 40-80 mg daily recheck her labs in 3 months    Hypertension  Blood pressure is actually good with metoprolol  Again walking will help bring this down as well      Depression  Patient will continue with citalopram  Patient is doing well with 1 pill every other day     Vitamin-D deficiency   Continue vitamin-D 5000 units daily          Recheck in 3 months  Screening blood work an A1c microalbumin  Recheck sooner if needed          Health Maintenance   Topic Date Due    Hepatitis C Screening  1955    HEPATITIS B VACCINES (1 of 3 - Risk 3-dose series) 12/03/1975    INFLUENZA VACCINE  07/01/2019    URINE MICROALBUMIN  08/08/2019    CRC Screening: Colonoscopy  12/23/2019    BMI: Followup Plan  02/18/2020    Diabetic Foot Exam  02/18/2020    HEMOGLOBIN A1C  04/07/2020    DTaP,Tdap,and Td Vaccines (2 - Tdap) 05/03/2020    DXA SCAN  07/10/2020    BMI: Adult  08/19/2020    MAMMOGRAM  08/19/2020    DM Eye Exam  09/24/2021    Cervical Cancer Screening  10/11/2021    Pneumococcal Vaccine: 65+ Years (2 of 2 - PPSV23) 12/03/2021    Pneumococcal Vaccine: Pediatrics (0 to 5 Years) and At-Risk Patients (6 to 59 Years)  Completed         Problem List as of 10/17/2019 Reviewed: 6/25/2019  2:39 PM by Cash Rivera, DO    Benign colon polyp    Diabetes mellitus, type 2 ([address] )    Essential hypertension    Insomnia    Mixed hyperlipidemia    Morbid obesity with BMI of 40 0-44 9, adult ([facility] [address] )    Psoriasis    Reactive depression    Uncomplicated alcohol abuse    Vitamin D deficiency            Subjective:   No chief complaint on file      Patient is here for 3 month diabetes check  Patient started taking her 2nd metformin few weeks after seeing os    She never got the is at a my to add to the atorvastatin  She is trying with diet alone  She really does not want no other pill added to her regimen      patient ID: Anabella Arevalo is a 59 y o  female      Past Medical History:   Diagnosis Date    Insomnia     Last assessed: 4/13/17     Past Surgical History:   Procedure Laterality Date    BREAST BIOPSY Left 05/26/2016    benign    BREAST EXCISIONAL BIOPSY Right 1974    benign    US GUIDED BREAST BIOPSY LEFT COMPLETE Left 5/26/2016     Family History   Problem Relation Age of Onset    Emphysema Mother     Alzheimer's disease Father     Hypertension Father     Alcohol abuse Brother     No Known Problems Sister     No Known Problems Maternal Grandmother     No Known Problems Maternal Grandfather     No Known Problems Paternal Grandmother     No Known Problems Paternal Grandfather     No Known Problems Maternal Aunt     Colon cancer Brother 61    Substance Abuse Neg Hx      Social History     Tobacco Use    Smoking status: Current Every Day Smoker     Packs/day: 1 50     Types: Cigarettes    Smokeless tobacco: Never Used   Substance Use Topics    Alcohol use: Yes     Comment: Social Per [employer]: 5-6 mixzed drinks every other weekend    Drug use: No     Social History     Tobacco Use   Smoking Status Current Every Day Smoker    Packs/day: 1 50    Types: Cigarettes   Smokeless Tobacco Never Used        MED LIST WAS REVIEWED AND UPDATED       ROS    Constitutional  No fever chills no fatigue no weight loss no weight gain    Mental status  No anxiety or depression and no sleep disturbances no changes in personality or emotional problems no suicidal or homicidal ideations    Eyes  No eye pain no red eyes no visual disturbances no discharge no eye itch    ENT  No earache no hearing loss nasal discharge no sore throat no hoarseness no postnasal drip     Cardio  No chest pain no palpitations no leg edema no claudication no dyspnea on exertion no nocturnal dyspnea    Respiratory  No shortness of breath or wheeze no cough no orthopnea no dyspnea on exertion no hemoptysis no sputum production    GI  No abdominal pain no nausea no vomiting no diarrhea or constipation no bloody stools no change in bowel habits no change in weight      no dysuria hematuria no pyuria no incontinence no pelvic pain    Musculoskeletal  No myalgias arthralgias no joint swelling or stiffness no limb pain or swelling    Skin  No rashes or lesions no itchiness and no wounds    Neuro  No headache dizziness lightheadedness syncope numbness paresthesias or confusion    Heme  No swollen glands no easy bruising            Objective:      VITALS:  Wt Readings from Last 3 Encounters:   08/19/19 104 kg (230 lb)   06/25/19 107 kg (234 lb 12 8 oz)   02/18/19 106 kg (232 lb 9 6 oz)     BP Readings from Last 3 Encounters:   06/25/19 120/70   02/18/19 130/80   10/11/18 122/72     Pulse Readings from Last 3 Encounters:   06/25/19 68   02/18/19 76   10/11/18 80     There is no height or weight on file to calculate BMI      Laboratory Results:   Lab Results   Component Value Date    WBC 10 6 02/06/2019    WBC 10 4 08/08/2018    WBC 10 6 01/09/2017    WBC NONE SEEN 01/09/2017    HGB 16 1 (H) 02/06/2019    HGB 16 1 (H) 08/08/2018    HGB 15 0 01/09/2017    HCT 47 0 (H) 02/06/2019    HCT 48 8 (H) 08/08/2018    HCT 46 9 (H) 01/09/2017    MCV 95 5 02/06/2019    MCV 97 0 08/08/2018    MCV 96 7 01/09/2017     02/06/2019     08/08/2018     01/09/2017     Lab Results   Component Value Date     01/09/2017     01/03/2015    K 4 5 08/08/2018    K 4 4 01/09/2017    K 4 0 01/03/2015    CL 99 08/08/2018    CL 99 01/09/2017    CL 98 (L) 01/03/2015    CO2 30 08/08/2018    CO2 30 01/09/2017    CO2 29 01/03/2015    BUN 12 08/08/2018    BUN 13 01/09/2017    BUN 12 01/03/2015     Lab Results   Component Value Date    GLUCOSE 102 01/03/2015    ALT 16 06/13/2019    AST 13 06/13/2019    ALKPHOS 84 06/13/2019    CALCIUM 9 4 08/08/2018     Lab Results   Component Value Date    TSHRFT4 1 70 08/08/2018         Lipid Profile:   Lab Results   Component Value Date    CHOL 198 04/10/2017    CHOL 332 (H) 01/09/2017    CHOL 188 02/24/2016     Lab Results   Component Value Date    HDL 43 (L) 10/07/2019    HDL 46 (L) 06/13/2019    HDL 48 (L) 02/06/2019     No results found for: [address]  Lab Results   Component Value Date    TRIG 183 (H) 10/07/2019    TRIG 182 (H) 06/13/2019    TRIG 172 (H) 02/06/2019       Diabetic labs (if applicable)  Lab Results   Component Value Date    HGBA1C 6 7 (H) 10/07/2019    HGBA1C 6 7 (H) 06/13/2019    HGBA1C 6 6 (H) 02/06/2019     Lab Results   Component Value Date    CREATININE 1 01 (H) 08/08/2018          Physical Exam     Constitutional  Appears healthy, Looks well, Appearance consistent with age    Mental Status  Alert, Oriented, Cooperative, Memory function normal , clean, and reasonable    Neck  No neck mass, No thyromegaly, Good carotid upstrokes bilaterally, trachea midline positive click    Respiratory  Breath sounds normal, No rales, No rhonchi, No wheezing, normal palpation    Cardiac   Regular rhythm without ectopy or murmur no S3-S4, no heave lift or thrill to palpation    Vascular  No leg edema, No pedal edema    Muscular skeletal  No clubbing cyanosis , muscle tone normal    Skin  No appreciable rashes or abnormal appearing lesions

## 2019-10-17 ENCOUNTER — OFFICE VISIT (OUTPATIENT)
Dept: FAMILY MEDICINE CLINIC | Facility: CLINIC | Age: 64
End: 2019-10-17
Payer: COMMERCIAL

## 2019-10-17 VITALS
BODY MASS INDEX: 42.93 KG/M2 | RESPIRATION RATE: 16 BRPM | DIASTOLIC BLOOD PRESSURE: 70 MMHG | HEART RATE: 72 BPM | WEIGHT: 233.3 LBS | SYSTOLIC BLOOD PRESSURE: 130 MMHG | HEIGHT: 62 IN

## 2019-10-17 DIAGNOSIS — E78.2 MIXED HYPERLIPIDEMIA: ICD-10-CM

## 2019-10-17 DIAGNOSIS — F10.10 UNCOMPLICATED ALCOHOL ABUSE: ICD-10-CM

## 2019-10-17 DIAGNOSIS — E66.01 MORBID OBESITY WITH BMI OF 40.0-44.9, ADULT (HCC): ICD-10-CM

## 2019-10-17 DIAGNOSIS — I10 ESSENTIAL HYPERTENSION: ICD-10-CM

## 2019-10-17 DIAGNOSIS — E11.9 TYPE 2 DIABETES MELLITUS WITHOUT COMPLICATION, WITHOUT LONG-TERM CURRENT USE OF INSULIN (HCC): Primary | ICD-10-CM

## 2019-10-17 DIAGNOSIS — E55.9 VITAMIN D DEFICIENCY: ICD-10-CM

## 2019-10-17 DIAGNOSIS — Z23 NEED FOR VACCINATION: ICD-10-CM

## 2019-10-17 PROCEDURE — 90682 RIV4 VACC RECOMBINANT DNA IM: CPT

## 2019-10-17 PROCEDURE — 90471 IMMUNIZATION ADMIN: CPT

## 2019-10-17 PROCEDURE — 3008F BODY MASS INDEX DOCD: CPT | Performed by: FAMILY MEDICINE

## 2019-10-17 PROCEDURE — 99214 OFFICE O/P EST MOD 30 MIN: CPT | Performed by: FAMILY MEDICINE

## 2019-10-17 PROCEDURE — 3075F SYST BP GE 130 - 139MM HG: CPT | Performed by: FAMILY MEDICINE

## 2019-10-17 PROCEDURE — 3078F DIAST BP <80 MM HG: CPT | Performed by: FAMILY MEDICINE

## 2019-10-17 RX ORDER — ATORVASTATIN CALCIUM 80 MG/1
80 TABLET, FILM COATED ORAL DAILY
Qty: 90 TABLET | Refills: 1 | Status: SHIPPED | OUTPATIENT
Start: 2019-10-17 | End: 2020-05-13

## 2019-10-17 RX ORDER — METFORMIN HYDROCHLORIDE 500 MG/1
TABLET, EXTENDED RELEASE ORAL
Qty: 180 TABLET | Refills: 0 | Status: SHIPPED | OUTPATIENT
Start: 2019-10-17 | End: 2020-04-05

## 2019-10-17 NOTE — PATIENT INSTRUCTIONS
1  We will increase atorvastatin from 40-80 mg daily  Any of the 40 mg tablets left, please take 2 of the 40 mg pills together to equal 80 mg until he empty the bottle  The new bottle is 80 mg you only need 1 pill daily  Continue take them in the evening    2  Continue metformin 1 pill twice daily 1 with breakfast and 1 with evening meal    3   Really try to exercise even a 10 minutes walk daily will help    Next blood work will be fasting with urine  Have it done 1 week before next visit in 3 months

## 2019-10-18 DIAGNOSIS — F41.9 ANXIETY: ICD-10-CM

## 2019-10-18 RX ORDER — CITALOPRAM 20 MG/1
TABLET ORAL
Qty: 90 TABLET | Refills: 1 | Status: SHIPPED | OUTPATIENT
Start: 2019-10-18 | End: 2020-04-02

## 2019-12-04 ENCOUNTER — TELEPHONE (OUTPATIENT)
Dept: GASTROENTEROLOGY | Facility: CLINIC | Age: 64
End: 2019-12-04

## 2020-01-02 LAB
25(OH)D3 SERPL-MCNC: 43 NG/ML (ref 30–100)
ALBUMIN SERPL-MCNC: 4.3 G/DL (ref 3.6–5.1)
ALBUMIN/CREAT UR: 10 MCG/MG CREAT
ALBUMIN/GLOB SERPL: 1.8 (CALC) (ref 1–2.5)
ALP SERPL-CCNC: 89 U/L (ref 33–130)
ALT SERPL-CCNC: 16 U/L (ref 6–29)
APPEARANCE UR: ABNORMAL
AST SERPL-CCNC: 13 U/L (ref 10–35)
BASOPHILS # BLD AUTO: 43 CELLS/UL (ref 0–200)
BASOPHILS NFR BLD AUTO: 0.4 %
BILIRUB SERPL-MCNC: 0.6 MG/DL (ref 0.2–1.2)
BILIRUB UR QL STRIP: NEGATIVE
BUN SERPL-MCNC: 13 MG/DL (ref 7–25)
BUN/CREAT SERPL: ABNORMAL (CALC) (ref 6–22)
CALCIUM SERPL-MCNC: 9.4 MG/DL (ref 8.6–10.4)
CHLORIDE SERPL-SCNC: 102 MMOL/L (ref 98–110)
CHOLEST SERPL-MCNC: 183 MG/DL
CHOLEST/HDLC SERPL: 4.1 (CALC)
CO2 SERPL-SCNC: 28 MMOL/L (ref 20–32)
COLOR UR: YELLOW
CREAT SERPL-MCNC: 0.81 MG/DL (ref 0.5–0.99)
CREAT UR-MCNC: 235 MG/DL (ref 20–275)
EOSINOPHIL # BLD AUTO: 96 CELLS/UL (ref 15–500)
EOSINOPHIL NFR BLD AUTO: 0.9 %
ERYTHROCYTE [DISTWIDTH] IN BLOOD BY AUTOMATED COUNT: 13.3 % (ref 11–15)
GLOBULIN SER CALC-MCNC: 2.4 G/DL (CALC) (ref 1.9–3.7)
GLUCOSE SERPL-MCNC: 123 MG/DL (ref 65–99)
GLUCOSE UR QL STRIP: NEGATIVE
HBA1C MFR BLD: 6.5 % OF TOTAL HGB
HCT VFR BLD AUTO: 45.3 % (ref 35–45)
HDLC SERPL-MCNC: 45 MG/DL
HGB BLD-MCNC: 15.2 G/DL (ref 11.7–15.5)
HGB UR QL STRIP: NEGATIVE
KETONES UR QL STRIP: NEGATIVE
LDLC SERPL CALC-MCNC: 108 MG/DL (CALC)
LEUKOCYTE ESTERASE UR QL STRIP: NEGATIVE
LYMPHOCYTES # BLD AUTO: 1840 CELLS/UL (ref 850–3900)
LYMPHOCYTES NFR BLD AUTO: 17.2 %
MCH RBC QN AUTO: 32.2 PG (ref 27–33)
MCHC RBC AUTO-ENTMCNC: 33.6 G/DL (ref 32–36)
MCV RBC AUTO: 96 FL (ref 80–100)
MICROALBUMIN UR-MCNC: 2.4 MG/DL
MONOCYTES # BLD AUTO: 631 CELLS/UL (ref 200–950)
MONOCYTES NFR BLD AUTO: 5.9 %
NEUTROPHILS # BLD AUTO: 8089 CELLS/UL (ref 1500–7800)
NEUTROPHILS NFR BLD AUTO: 75.6 %
NITRITE UR QL STRIP: NEGATIVE
NONHDLC SERPL-MCNC: 138 MG/DL (CALC)
PH UR STRIP: ABNORMAL [PH] (ref 5–8)
PLATELET # BLD AUTO: 250 THOUSAND/UL (ref 140–400)
PMV BLD REES-ECKER: 11.2 FL (ref 7.5–12.5)
POTASSIUM SERPL-SCNC: 4.2 MMOL/L (ref 3.5–5.3)
PROT SERPL-MCNC: 6.7 G/DL (ref 6.1–8.1)
PROT UR QL STRIP: NEGATIVE
RBC # BLD AUTO: 4.72 MILLION/UL (ref 3.8–5.1)
SL AMB EGFR AFRICAN AMERICAN: 90 ML/MIN/1.73M2
SL AMB EGFR NON AFRICAN AMERICAN: 77 ML/MIN/1.73M2
SODIUM SERPL-SCNC: 139 MMOL/L (ref 135–146)
SP GR UR STRIP: 1.03 (ref 1–1.03)
TRIGL SERPL-MCNC: 179 MG/DL
TSH SERPL-ACNC: 1.78 MIU/L (ref 0.4–4.5)
WBC # BLD AUTO: 10.7 THOUSAND/UL (ref 3.8–10.8)

## 2020-01-02 PROCEDURE — 3061F NEG MICROALBUMINURIA REV: CPT | Performed by: FAMILY MEDICINE

## 2020-01-02 PROCEDURE — 3044F HG A1C LEVEL LT 7.0%: CPT | Performed by: FAMILY MEDICINE

## 2020-01-15 NOTE — TELEPHONE ENCOUNTER
Dr Lopez Feeling has been contacted to schedule recall colon with no response-please advise   Thank you

## 2020-01-19 NOTE — PROGRESS NOTES
ASSESSMENT/PLAN:    Type 2 diabetes  A1c improved 6 5 from 6 7  Continue metformin 500 twice a day and do not dropped to once daily  Try to add some exercise to bring A1c down    Hyperlipidemia  Despite doubling her atorvastatin her values are about the same  We will keep atorvastatin 80 mg and will add zetia to this  We will recheck her lipids before next visit    Hypertension  Blood pressure is very good with metoprolol    Depression  Doing well with citalopram is but needs to take it daily     Vitamin-D deficiency   Continue vitamin-D 5000 units daily          Recheck in 3 months  A1c and fasting lipids 1 week prior  Recheck sooner if needed      BMI Counseling: Body mass index is 42 28 kg/m²  The BMI is above normal  Nutrition recommendations include decreasing portion sizes and encouraging healthy choices of fruits and vegetables  Exercise recommendations include exercising 3-5 times per week  Patient lost another 4 lb today  Already decreasing portion sizes and exercising      Tobacco Cessation Counseling:  The patient is sincerely urged to quit consumption of tobacco  She is ready to quit tobacco           Health Maintenance   Topic Date Due    Hepatitis C Screening  1955    HIV Screening  12/03/1971    Hepatitis B Vaccine (1 of 3 - Risk 3-dose series) 12/03/1975    Annual Physical  10/11/2019    CRC Screening: Colonoscopy  12/23/2019    BMI: Followup Plan  02/18/2020    Diabetic Foot Exam  02/18/2020    DTaP,Tdap,and Td Vaccines (2 - Tdap) 05/03/2020    HEMOGLOBIN A1C  06/30/2020    DXA SCAN  07/10/2020    MAMMOGRAM  08/19/2020    URINE MICROALBUMIN  12/31/2020    BMI: Adult  01/20/2021    DM Eye Exam  09/24/2021    Cervical Cancer Screening  10/11/2021    Pneumococcal Vaccine: 65+ Years (2 of 2 - PPSV23) 12/03/2021    Pneumococcal Vaccine: Pediatrics (0 to 5 Years) and At-Risk Patients (6 to 59 Years)  Completed    Influenza Vaccine  Completed    HIB Vaccine  Aged Out    IPV Vaccine  Aged Out    Hepatitis A Vaccine  Aged Out    Meningococcal ACWY Vaccine  Aged Out    HPV Vaccine  Aged Out         Problem List as of 1/20/2020 Reviewed: 10/17/2019  4:18 PM by Kaz Lea DO    Benign colon polyp    Essential hypertension    Insomnia    Mixed hyperlipidemia    Morbid obesity with BMI of 40 0-44 9, adult ([facility] [address] )    Psoriasis    Reactive depression    Type 2 diabetes mellitus without complication, without long-term current use of insulin ([facility] )    Uncomplicated alcohol abuse    Vitamin D deficiency            Subjective:   Chief Complaint   Patient presents with    Diabetes     Patient is here for recheck on her blood pressure and for her diabetes recheck  She also doubled her atorvastatin and is here to see what the blood results are  She started right away so she has been doing it for at least 3 months and has been consistent  She still has good control of the wine in even drinking less than before which is probably evidenced by her 4 lb weight loss  Overall she feels well       patient ID: Fausto Cid is a 62 y o  female      Past Medical History:   Diagnosis Date    Insomnia     Last assessed: 4/13/17     Past Surgical History:   Procedure Laterality Date    BREAST BIOPSY Left 05/26/2016    benign    BREAST EXCISIONAL BIOPSY Right 1974    benign    US GUIDED BREAST BIOPSY LEFT COMPLETE Left 5/26/2016     Family History   Problem Relation Age of Onset    Emphysema Mother     Alzheimer's disease Father     Hypertension Father     Alcohol abuse Brother     No Known Problems Sister     No Known Problems Maternal Grandmother     No Known Problems Maternal Grandfather     No Known Problems Paternal Grandmother     No Known Problems Paternal Grandfather     No Known Problems Maternal Aunt     Colon cancer Brother 61    Substance Abuse Neg Hx      Social History     Tobacco Use    Smoking status: Current Every Day Smoker     Packs/day: 1 50     Types: Cigarettes    Smokeless tobacco: Never Used   Substance Use Topics    Alcohol use: Yes     Comment: Social Per [employer]: 5-6 mixzed drinks every other weekend    Drug use: No     Social History     Tobacco Use   Smoking Status Current Every Day Smoker    Packs/day: 1 50    Types: Cigarettes   Smokeless Tobacco Never Used        MED LIST WAS REVIEWED AND UPDATED       ROS    Constitutional  No fever chills no fatigue no weight loss no weight gain    Mental status  No anxiety or depression and no sleep disturbances no changes in personality or emotional problems no suicidal or homicidal ideations    Eyes  No eye pain no red eyes no visual disturbances no discharge no eye itch    ENT  No earache no hearing loss nasal discharge no sore throat no hoarseness no postnasal drip     Cardio  No chest pain no palpitations no leg edema no claudication no dyspnea on exertion no nocturnal dyspnea    Respiratory  No shortness of breath or wheeze no cough no orthopnea no dyspnea on exertion no hemoptysis no sputum production    GI  No abdominal pain no nausea no vomiting no diarrhea or constipation no bloody stools no change in bowel habits no change in weight      no dysuria hematuria no pyuria no incontinence no pelvic pain    Musculoskeletal  No myalgias arthralgias no joint swelling or stiffness no limb pain or swelling    Skin  No rashes or lesions no itchiness and no wounds    Neuro  No headache dizziness lightheadedness syncope numbness paresthesias or confusion    Heme  No swollen glands no easy bruising            Objective:      VITALS:  Wt Readings from Last 3 Encounters:   01/20/20 104 kg (229 lb 4 8 oz)   10/17/19 106 kg (233 lb 4 8 oz)   08/19/19 104 kg (230 lb)     BP Readings from Last 3 Encounters:   01/20/20 132/70   10/17/19 130/70   06/25/19 120/70     Pulse Readings from Last 3 Encounters:   01/20/20 80   10/17/19 72   06/25/19 68     Body mass index is 42 28 kg/m²  Laboratory Results:    All pertinent labs and studies were reviewed with patient  during this office visit  including the following:    Lab Results   Component Value Date    WBC 10 7 12/31/2019    WBC 10 6 02/06/2019    WBC 10 4 08/08/2018    HGB 15 2 12/31/2019    HGB 16 1 (H) 02/06/2019    HGB 16 1 (H) 08/08/2018    HCT 45 3 (H) 12/31/2019    HCT 47 0 (H) 02/06/2019    HCT 48 8 (H) 08/08/2018    MCV 96 0 12/31/2019    MCV 95 5 02/06/2019    MCV 97 0 08/08/2018     12/31/2019     02/06/2019     08/08/2018     Lab Results   Component Value Date     01/09/2017     01/03/2015    K 4 2 12/31/2019    K 4 5 08/08/2018    K 4 4 01/09/2017     12/31/2019    CL 99 08/08/2018    CL 99 01/09/2017    CO2 28 12/31/2019    CO2 30 08/08/2018    CO2 30 01/09/2017    BUN 13 12/31/2019    BUN 12 08/08/2018    BUN 13 01/09/2017     Lab Results   Component Value Date    GLUCOSE 102 01/03/2015    ALT 16 12/31/2019    AST 13 12/31/2019    ALKPHOS 89 12/31/2019    CALCIUM 9 4 12/31/2019     Lab Results   Component Value Date    TSHRFT4 1 78 12/31/2019    TSHRFT4 1 70 08/08/2018           Lipid Profile:   Lab Results   Component Value Date    CHOL 198 04/10/2017    CHOL 332 (H) 01/09/2017    CHOL 188 02/24/2016     Lab Results   Component Value Date    HDL 45 (L) 12/31/2019    HDL 43 (L) 10/07/2019    HDL 46 (L) 06/13/2019     No results found for: [facility]  Lab Results   Component Value Date    TRIG 179 (H) 12/31/2019    TRIG 183 (H) 10/07/2019    TRIG 182 (H) 06/13/2019       Diabetic labs (if applicable)  Lab Results   Component Value Date    HGBA1C 6 5 (H) 12/31/2019    HGBA1C 6 7 (H) 10/07/2019    HGBA1C 6 7 (H) 06/13/2019     Lab Results   Component Value Date    CREATININE 0 81 12/31/2019          Physical Exam    Gen    No acute distress well-appearing well-nourished appears stated age    Mental status  Good judgment and insight oriented to time person and place, recent and remote memory intact mood and affect normal cooperative and patient is reasonable    HEENT  PERRLA 3 mm, EOMI without nystagmus, TMs clear, turbinates open pink no exudate, pharynx benign, tongue midline    Neck   supple no masses trachea midline positive click normal carotid upstrokes with no bruits    Cor  Regular rhythm without ectopy or murmur, no S3-S4, normal palpation that is no heave lift or thrill    Vascular  No edema, good pedal pulses    Lungs  CTA bilaterally in no respiratory distress no wheezes rhonchi or rales, normal to palpation no tactile fremitus    Abdomen  Soft, no palpable masses, no hepatosplenomegaly, normal bowel sounds, nontender    Lymphatics  No palpable nodes in the neck, supraclavicular area, axilla, or groin     Musculoskeletal  No clubbing cyanosis or edema muscle tone normal    Skin  no rashes or abnormal appearing lesions    Neuro  Normal ambulation, cranial nerves 2-12 grossly intact, higher functioning with reasoning intact

## 2020-01-20 ENCOUNTER — OFFICE VISIT (OUTPATIENT)
Dept: FAMILY MEDICINE CLINIC | Facility: CLINIC | Age: 65
End: 2020-01-20
Payer: COMMERCIAL

## 2020-01-20 VITALS
HEART RATE: 80 BPM | DIASTOLIC BLOOD PRESSURE: 70 MMHG | WEIGHT: 229.3 LBS | RESPIRATION RATE: 16 BRPM | SYSTOLIC BLOOD PRESSURE: 132 MMHG | HEIGHT: 62 IN | BODY MASS INDEX: 42.2 KG/M2

## 2020-01-20 DIAGNOSIS — I10 ESSENTIAL HYPERTENSION: ICD-10-CM

## 2020-01-20 DIAGNOSIS — E11.9 TYPE 2 DIABETES MELLITUS WITHOUT COMPLICATION, WITHOUT LONG-TERM CURRENT USE OF INSULIN (HCC): ICD-10-CM

## 2020-01-20 DIAGNOSIS — Z12.11 ENCOUNTER FOR SCREENING COLONOSCOPY: ICD-10-CM

## 2020-01-20 DIAGNOSIS — E55.9 VITAMIN D DEFICIENCY: ICD-10-CM

## 2020-01-20 DIAGNOSIS — E78.2 MIXED HYPERLIPIDEMIA: ICD-10-CM

## 2020-01-20 DIAGNOSIS — F10.10 UNCOMPLICATED ALCOHOL ABUSE: ICD-10-CM

## 2020-01-20 DIAGNOSIS — Z00.00 ROUTINE GENERAL MEDICAL EXAMINATION AT A HEALTH CARE FACILITY: Primary | ICD-10-CM

## 2020-01-20 DIAGNOSIS — L40.9 PSORIASIS: ICD-10-CM

## 2020-01-20 DIAGNOSIS — F33.41 RECURRENT MAJOR DEPRESSIVE DISORDER, IN PARTIAL REMISSION (HCC): ICD-10-CM

## 2020-01-20 DIAGNOSIS — E66.01 MORBID OBESITY WITH BMI OF 40.0-44.9, ADULT (HCC): ICD-10-CM

## 2020-01-20 PROCEDURE — 99396 PREV VISIT EST AGE 40-64: CPT | Performed by: FAMILY MEDICINE

## 2020-01-20 PROCEDURE — 3078F DIAST BP <80 MM HG: CPT | Performed by: FAMILY MEDICINE

## 2020-01-20 PROCEDURE — 99214 OFFICE O/P EST MOD 30 MIN: CPT | Performed by: FAMILY MEDICINE

## 2020-01-20 PROCEDURE — 3008F BODY MASS INDEX DOCD: CPT | Performed by: FAMILY MEDICINE

## 2020-01-20 PROCEDURE — 3075F SYST BP GE 130 - 139MM HG: CPT | Performed by: FAMILY MEDICINE

## 2020-01-20 RX ORDER — EZETIMIBE 10 MG/1
10 TABLET ORAL
Qty: 90 TABLET | Refills: 1 | Status: SHIPPED | OUTPATIENT
Start: 2020-01-20 | End: 2020-07-20

## 2020-01-20 NOTE — PATIENT INSTRUCTIONS
1  Add Zetia 2 atorvastatin take both of them in the evening    2  Keep citalopram every day do not change it    3  Keep metformin 1 with breakfast 1 with evening meal and do not change this    4  I will see you back in 3 months with fasting blood work to recheck her cholesterol as well as her A1c  I will see you sooner if needed    5   Please work on getting her colonoscopy done

## 2020-03-05 DIAGNOSIS — I10 ESSENTIAL HYPERTENSION: ICD-10-CM

## 2020-03-05 RX ORDER — METOPROLOL SUCCINATE 50 MG/1
TABLET, EXTENDED RELEASE ORAL
Qty: 90 TABLET | Refills: 3 | Status: SHIPPED | OUTPATIENT
Start: 2020-03-05 | End: 2021-02-16

## 2020-03-18 ENCOUNTER — TELEPHONE (OUTPATIENT)
Dept: FAMILY MEDICINE CLINIC | Facility: CLINIC | Age: 65
End: 2020-03-18

## 2020-03-18 DIAGNOSIS — K62.89 ANAL OR RECTAL PAIN: Primary | ICD-10-CM

## 2020-03-18 NOTE — TELEPHONE ENCOUNTER
Pt called to request a refill (Dr CONTRERAS patient)    Please send in a refill for proctozone creme to [facility] in [place]

## 2020-04-02 DIAGNOSIS — F41.9 ANXIETY: ICD-10-CM

## 2020-04-02 RX ORDER — CITALOPRAM 20 MG/1
TABLET ORAL
Qty: 90 TABLET | Refills: 1 | Status: SHIPPED | OUTPATIENT
Start: 2020-04-02 | End: 2020-10-12 | Stop reason: SDUPTHER

## 2020-04-04 DIAGNOSIS — E11.9 TYPE 2 DIABETES MELLITUS WITHOUT COMPLICATION, WITHOUT LONG-TERM CURRENT USE OF INSULIN (HCC): ICD-10-CM

## 2020-04-05 RX ORDER — METFORMIN HYDROCHLORIDE 500 MG/1
TABLET, EXTENDED RELEASE ORAL
Qty: 180 TABLET | Refills: 0 | Status: SHIPPED | OUTPATIENT
Start: 2020-04-05 | End: 2020-08-19

## 2020-05-12 DIAGNOSIS — E78.2 MIXED HYPERLIPIDEMIA: ICD-10-CM

## 2020-05-13 RX ORDER — ATORVASTATIN CALCIUM 80 MG/1
TABLET, FILM COATED ORAL
Qty: 90 TABLET | Refills: 1 | Status: SHIPPED | OUTPATIENT
Start: 2020-05-13 | End: 2020-11-10

## 2020-05-26 ENCOUNTER — OFFICE VISIT (OUTPATIENT)
Dept: FAMILY MEDICINE CLINIC | Facility: CLINIC | Age: 65
End: 2020-05-26
Payer: COMMERCIAL

## 2020-05-26 ENCOUNTER — TELEPHONE (OUTPATIENT)
Dept: FAMILY MEDICINE CLINIC | Facility: CLINIC | Age: 65
End: 2020-05-26

## 2020-05-26 VITALS
WEIGHT: 227.7 LBS | TEMPERATURE: 97.9 F | SYSTOLIC BLOOD PRESSURE: 134 MMHG | BODY MASS INDEX: 41.98 KG/M2 | HEART RATE: 66 BPM | RESPIRATION RATE: 14 BRPM | DIASTOLIC BLOOD PRESSURE: 74 MMHG

## 2020-05-26 DIAGNOSIS — E55.9 VITAMIN D DEFICIENCY: ICD-10-CM

## 2020-05-26 DIAGNOSIS — E11.9 TYPE 2 DIABETES MELLITUS WITHOUT COMPLICATION, WITHOUT LONG-TERM CURRENT USE OF INSULIN (HCC): Primary | ICD-10-CM

## 2020-05-26 DIAGNOSIS — E78.2 MIXED HYPERLIPIDEMIA: ICD-10-CM

## 2020-05-26 DIAGNOSIS — I10 ESSENTIAL HYPERTENSION: ICD-10-CM

## 2020-05-26 DIAGNOSIS — F33.41 RECURRENT MAJOR DEPRESSIVE DISORDER, IN PARTIAL REMISSION (HCC): ICD-10-CM

## 2020-05-26 PROCEDURE — 99214 OFFICE O/P EST MOD 30 MIN: CPT | Performed by: FAMILY MEDICINE

## 2020-05-26 PROCEDURE — 3078F DIAST BP <80 MM HG: CPT | Performed by: FAMILY MEDICINE

## 2020-05-26 PROCEDURE — 2022F DILAT RTA XM EVC RTNOPTHY: CPT | Performed by: FAMILY MEDICINE

## 2020-05-26 PROCEDURE — 4004F PT TOBACCO SCREEN RCVD TLK: CPT | Performed by: FAMILY MEDICINE

## 2020-05-26 PROCEDURE — 3075F SYST BP GE 130 - 139MM HG: CPT | Performed by: FAMILY MEDICINE

## 2020-07-20 DIAGNOSIS — E78.2 MIXED HYPERLIPIDEMIA: ICD-10-CM

## 2020-07-20 RX ORDER — EZETIMIBE 10 MG/1
TABLET ORAL
Qty: 90 TABLET | Refills: 1 | Status: SHIPPED | OUTPATIENT
Start: 2020-07-20 | End: 2020-08-12 | Stop reason: SDUPTHER

## 2020-08-12 ENCOUNTER — TELEPHONE (OUTPATIENT)
Dept: FAMILY MEDICINE CLINIC | Facility: CLINIC | Age: 65
End: 2020-08-12

## 2020-08-12 DIAGNOSIS — E78.2 MIXED HYPERLIPIDEMIA: ICD-10-CM

## 2020-08-12 RX ORDER — EZETIMIBE 10 MG/1
10 TABLET ORAL
Qty: 90 TABLET | Refills: 1 | Status: SHIPPED | OUTPATIENT
Start: 2020-08-12 | End: 2021-08-09

## 2020-08-19 DIAGNOSIS — E11.9 TYPE 2 DIABETES MELLITUS WITHOUT COMPLICATION, WITHOUT LONG-TERM CURRENT USE OF INSULIN (HCC): ICD-10-CM

## 2020-08-19 RX ORDER — METFORMIN HYDROCHLORIDE 500 MG/1
TABLET, EXTENDED RELEASE ORAL
Qty: 180 TABLET | Refills: 0 | Status: SHIPPED | OUTPATIENT
Start: 2020-08-19 | End: 2020-11-10

## 2020-10-12 DIAGNOSIS — F41.9 ANXIETY: ICD-10-CM

## 2020-10-12 DIAGNOSIS — Z12.31 ENCOUNTER FOR SCREENING MAMMOGRAM FOR MALIGNANT NEOPLASM OF BREAST: Primary | ICD-10-CM

## 2020-10-12 RX ORDER — CITALOPRAM 20 MG/1
20 TABLET ORAL DAILY
Qty: 90 TABLET | Refills: 0 | Status: SHIPPED | OUTPATIENT
Start: 2020-10-12 | End: 2021-01-12

## 2020-11-09 DIAGNOSIS — E11.9 TYPE 2 DIABETES MELLITUS WITHOUT COMPLICATION, WITHOUT LONG-TERM CURRENT USE OF INSULIN (HCC): ICD-10-CM

## 2020-11-09 DIAGNOSIS — E78.2 MIXED HYPERLIPIDEMIA: ICD-10-CM

## 2020-11-10 DIAGNOSIS — E55.9 VITAMIN D DEFICIENCY: ICD-10-CM

## 2020-11-10 DIAGNOSIS — E78.2 MIXED HYPERLIPIDEMIA: ICD-10-CM

## 2020-11-10 DIAGNOSIS — I10 ESSENTIAL HYPERTENSION: Primary | ICD-10-CM

## 2020-11-10 DIAGNOSIS — E11.9 TYPE 2 DIABETES MELLITUS WITHOUT COMPLICATION, WITHOUT LONG-TERM CURRENT USE OF INSULIN (HCC): ICD-10-CM

## 2020-11-10 RX ORDER — ATORVASTATIN CALCIUM 80 MG/1
TABLET, FILM COATED ORAL
Qty: 90 TABLET | Refills: 3 | Status: SHIPPED | OUTPATIENT
Start: 2020-11-10 | End: 2021-12-01

## 2020-11-10 RX ORDER — METFORMIN HYDROCHLORIDE 500 MG/1
TABLET, EXTENDED RELEASE ORAL
Qty: 60 TABLET | Refills: 0 | Status: SHIPPED | OUTPATIENT
Start: 2020-11-10 | End: 2020-12-22

## 2020-12-03 ENCOUNTER — TELEMEDICINE (OUTPATIENT)
Dept: GASTROENTEROLOGY | Facility: CLINIC | Age: 65
End: 2020-12-03

## 2020-12-03 VITALS — BODY MASS INDEX: 44.16 KG/M2 | WEIGHT: 240 LBS | HEIGHT: 62 IN

## 2020-12-03 DIAGNOSIS — Z86.010 HX OF COLONIC POLYPS: Primary | ICD-10-CM

## 2020-12-03 RX ORDER — SODIUM PICOSULFATE, MAGNESIUM OXIDE, AND ANHYDROUS CITRIC ACID 10; 3.5; 12 MG/160ML; G/160ML; G/160ML
LIQUID ORAL
Qty: 2 BOTTLE | Refills: 0 | Status: SHIPPED | OUTPATIENT
Start: 2020-12-03 | End: 2020-12-10 | Stop reason: HOSPADM

## 2020-12-10 ENCOUNTER — ANESTHESIA EVENT (OUTPATIENT)
Dept: GASTROENTEROLOGY | Facility: AMBULATORY SURGERY CENTER | Age: 65
End: 2020-12-10

## 2020-12-10 ENCOUNTER — ANESTHESIA (OUTPATIENT)
Dept: GASTROENTEROLOGY | Facility: AMBULATORY SURGERY CENTER | Age: 65
End: 2020-12-10

## 2020-12-10 ENCOUNTER — HOSPITAL ENCOUNTER (OUTPATIENT)
Dept: GASTROENTEROLOGY | Facility: AMBULATORY SURGERY CENTER | Age: 65
Discharge: HOME | End: 2020-12-10
Payer: COMMERCIAL

## 2020-12-10 VITALS
TEMPERATURE: 98.4 F | SYSTOLIC BLOOD PRESSURE: 119 MMHG | DIASTOLIC BLOOD PRESSURE: 89 MMHG | RESPIRATION RATE: 17 BRPM | OXYGEN SATURATION: 93 % | HEART RATE: 68 BPM

## 2020-12-10 VITALS — HEART RATE: 69 BPM

## 2020-12-10 DIAGNOSIS — Z86.010 HISTORY OF COLON POLYPS: ICD-10-CM

## 2020-12-10 DIAGNOSIS — Z80.0 FAMILY HISTORY OF COLON CANCER: ICD-10-CM

## 2020-12-10 PROCEDURE — 45380 COLONOSCOPY AND BIOPSY: CPT | Performed by: INTERNAL MEDICINE

## 2020-12-10 PROCEDURE — 88305 TISSUE EXAM BY PATHOLOGIST: CPT | Performed by: PATHOLOGY

## 2020-12-10 PROCEDURE — 45385 COLONOSCOPY W/LESION REMOVAL: CPT | Performed by: INTERNAL MEDICINE

## 2020-12-10 RX ORDER — PROPOFOL 10 MG/ML
INJECTION, EMULSION INTRAVENOUS AS NEEDED
Status: DISCONTINUED | OUTPATIENT
Start: 2020-12-10 | End: 2020-12-10

## 2020-12-10 RX ORDER — SODIUM CHLORIDE 9 MG/ML
50 INJECTION, SOLUTION INTRAVENOUS CONTINUOUS
Status: DISCONTINUED | OUTPATIENT
Start: 2020-12-10 | End: 2020-12-14 | Stop reason: HOSPADM

## 2020-12-10 RX ADMIN — PROPOFOL 150 MG: 10 INJECTION, EMULSION INTRAVENOUS at 08:36

## 2020-12-10 RX ADMIN — PROPOFOL 20 MG: 10 INJECTION, EMULSION INTRAVENOUS at 08:58

## 2020-12-10 RX ADMIN — SODIUM CHLORIDE 50 ML/HR: 9 INJECTION, SOLUTION INTRAVENOUS at 08:18

## 2020-12-10 RX ADMIN — PROPOFOL 50 MG: 10 INJECTION, EMULSION INTRAVENOUS at 08:40

## 2020-12-10 RX ADMIN — PROPOFOL 100 MG: 10 INJECTION, EMULSION INTRAVENOUS at 08:43

## 2020-12-10 RX ADMIN — PROPOFOL 30 MG: 10 INJECTION, EMULSION INTRAVENOUS at 08:51

## 2020-12-11 ENCOUNTER — TELEPHONE (OUTPATIENT)
Dept: GASTROENTEROLOGY | Facility: CLINIC | Age: 65
End: 2020-12-11

## 2020-12-16 LAB
25(OH)D3 SERPL-MCNC: 36 NG/ML (ref 30–100)
ALBUMIN SERPL-MCNC: 4 G/DL (ref 3.6–5.1)
ALBUMIN/GLOB SERPL: 1.5 (CALC) (ref 1–2.5)
ALP SERPL-CCNC: 71 U/L (ref 37–153)
ALT SERPL-CCNC: 18 U/L (ref 6–29)
APPEARANCE UR: ABNORMAL
AST SERPL-CCNC: 15 U/L (ref 10–35)
BASOPHILS # BLD AUTO: 40 CELLS/UL (ref 0–200)
BASOPHILS NFR BLD AUTO: 0.4 %
BILIRUB SERPL-MCNC: 0.7 MG/DL (ref 0.2–1.2)
BILIRUB UR QL STRIP: NEGATIVE
BUN SERPL-MCNC: 11 MG/DL (ref 7–25)
BUN/CREAT SERPL: ABNORMAL (CALC) (ref 6–22)
CALCIUM SERPL-MCNC: 9.1 MG/DL (ref 8.6–10.4)
CHLORIDE SERPL-SCNC: 103 MMOL/L (ref 98–110)
CHOLEST SERPL-MCNC: 151 MG/DL
CHOLEST/HDLC SERPL: 3.1 (CALC)
CO2 SERPL-SCNC: 27 MMOL/L (ref 20–32)
COLOR UR: YELLOW
CREAT SERPL-MCNC: 0.81 MG/DL (ref 0.5–0.99)
EOSINOPHIL # BLD AUTO: 99 CELLS/UL (ref 15–500)
EOSINOPHIL NFR BLD AUTO: 1 %
ERYTHROCYTE [DISTWIDTH] IN BLOOD BY AUTOMATED COUNT: 12.7 % (ref 11–15)
EST. AVERAGE GLUCOSE BLD GHB EST-MCNC: 143 MG/DL (CALC)
GLOBULIN SER CALC-MCNC: 2.6 G/DL (CALC) (ref 1.9–3.7)
GLUCOSE SERPL-MCNC: 118 MG/DL (ref 65–99)
GLUCOSE UR QL STRIP: NEGATIVE
HBA1C MFR BLD: 6.2 % OF TOTAL HGB
HCT VFR BLD AUTO: 45.9 % (ref 35–45)
HDLC SERPL-MCNC: 48 MG/DL
HGB BLD-MCNC: 15 G/DL (ref 11.7–15.5)
HGB UR QL STRIP: NEGATIVE
KETONES UR QL STRIP: NEGATIVE
LDLC SERPL CALC-MCNC: 76 MG/DL (CALC)
LEUKOCYTE ESTERASE UR QL STRIP: NEGATIVE
LYMPHOCYTES # BLD AUTO: 1990 CELLS/UL (ref 850–3900)
LYMPHOCYTES NFR BLD AUTO: 20.1 %
MCH RBC QN AUTO: 32 PG (ref 27–33)
MCHC RBC AUTO-ENTMCNC: 32.7 G/DL (ref 32–36)
MCV RBC AUTO: 97.9 FL (ref 80–100)
MONOCYTES # BLD AUTO: 634 CELLS/UL (ref 200–950)
MONOCYTES NFR BLD AUTO: 6.4 %
NEUTROPHILS # BLD AUTO: 7138 CELLS/UL (ref 1500–7800)
NEUTROPHILS NFR BLD AUTO: 72.1 %
NITRITE UR QL STRIP: NEGATIVE
NONHDLC SERPL-MCNC: 103 MG/DL (CALC)
PH UR STRIP: ABNORMAL [PH] (ref 5–8)
PLATELET # BLD AUTO: 277 THOUSAND/UL (ref 140–400)
PMV BLD REES-ECKER: 11.8 FL (ref 7.5–12.5)
POTASSIUM SERPL-SCNC: 4.5 MMOL/L (ref 3.5–5.3)
PROT SERPL-MCNC: 6.6 G/DL (ref 6.1–8.1)
PROT UR QL STRIP: NEGATIVE
RBC # BLD AUTO: 4.69 MILLION/UL (ref 3.8–5.1)
SL AMB EGFR AFRICAN AMERICAN: 89 ML/MIN/1.73M2
SL AMB EGFR NON AFRICAN AMERICAN: 77 ML/MIN/1.73M2
SODIUM SERPL-SCNC: 139 MMOL/L (ref 135–146)
SP GR UR STRIP: 1.02 (ref 1–1.03)
TRIGL SERPL-MCNC: 171 MG/DL
TSH SERPL-ACNC: 1.68 MIU/L (ref 0.4–4.5)
WBC # BLD AUTO: 9.9 THOUSAND/UL (ref 3.8–10.8)

## 2020-12-21 ENCOUNTER — OFFICE VISIT (OUTPATIENT)
Dept: FAMILY MEDICINE CLINIC | Facility: CLINIC | Age: 65
End: 2020-12-21
Payer: COMMERCIAL

## 2020-12-21 VITALS
TEMPERATURE: 98.3 F | SYSTOLIC BLOOD PRESSURE: 116 MMHG | BODY MASS INDEX: 42.53 KG/M2 | DIASTOLIC BLOOD PRESSURE: 78 MMHG | HEIGHT: 62 IN | RESPIRATION RATE: 16 BRPM | HEART RATE: 80 BPM | WEIGHT: 231.1 LBS

## 2020-12-21 DIAGNOSIS — E55.9 VITAMIN D DEFICIENCY: ICD-10-CM

## 2020-12-21 DIAGNOSIS — K63.5 BENIGN COLON POLYP: ICD-10-CM

## 2020-12-21 DIAGNOSIS — F10.10 UNCOMPLICATED ALCOHOL ABUSE: ICD-10-CM

## 2020-12-21 DIAGNOSIS — E11.9 TYPE 2 DIABETES MELLITUS WITHOUT COMPLICATION, WITHOUT LONG-TERM CURRENT USE OF INSULIN (HCC): ICD-10-CM

## 2020-12-21 DIAGNOSIS — Z00.00 ROUTINE GENERAL MEDICAL EXAMINATION AT A HEALTH CARE FACILITY: Primary | ICD-10-CM

## 2020-12-21 DIAGNOSIS — Z78.0 MENOPAUSE: ICD-10-CM

## 2020-12-21 DIAGNOSIS — F33.41 RECURRENT MAJOR DEPRESSIVE DISORDER, IN PARTIAL REMISSION (HCC): ICD-10-CM

## 2020-12-21 DIAGNOSIS — I10 ESSENTIAL HYPERTENSION: ICD-10-CM

## 2020-12-21 DIAGNOSIS — E78.2 MIXED HYPERLIPIDEMIA: ICD-10-CM

## 2020-12-21 PROCEDURE — 99396 PREV VISIT EST AGE 40-64: CPT | Performed by: FAMILY MEDICINE

## 2020-12-21 PROCEDURE — 3725F SCREEN DEPRESSION PERFORMED: CPT | Performed by: FAMILY MEDICINE

## 2020-12-21 PROCEDURE — 99214 OFFICE O/P EST MOD 30 MIN: CPT | Performed by: FAMILY MEDICINE

## 2020-12-21 PROCEDURE — 3074F SYST BP LT 130 MM HG: CPT | Performed by: FAMILY MEDICINE

## 2020-12-21 PROCEDURE — 3008F BODY MASS INDEX DOCD: CPT | Performed by: FAMILY MEDICINE

## 2020-12-21 PROCEDURE — 93000 ELECTROCARDIOGRAM COMPLETE: CPT | Performed by: FAMILY MEDICINE

## 2020-12-21 PROCEDURE — 3078F DIAST BP <80 MM HG: CPT | Performed by: FAMILY MEDICINE

## 2020-12-21 PROCEDURE — 4004F PT TOBACCO SCREEN RCVD TLK: CPT | Performed by: FAMILY MEDICINE

## 2020-12-22 DIAGNOSIS — E11.9 TYPE 2 DIABETES MELLITUS WITHOUT COMPLICATION, WITHOUT LONG-TERM CURRENT USE OF INSULIN (HCC): ICD-10-CM

## 2020-12-22 RX ORDER — METFORMIN HYDROCHLORIDE 500 MG/1
TABLET, EXTENDED RELEASE ORAL
Qty: 180 TABLET | Refills: 0 | Status: SHIPPED | OUTPATIENT
Start: 2020-12-22 | End: 2021-03-24

## 2021-01-11 DIAGNOSIS — F41.9 ANXIETY: ICD-10-CM

## 2021-01-12 RX ORDER — CITALOPRAM 20 MG/1
TABLET ORAL
Qty: 90 TABLET | Refills: 1 | Status: SHIPPED | OUTPATIENT
Start: 2021-01-12 | End: 2021-05-09

## 2021-01-14 ENCOUNTER — HOSPITAL ENCOUNTER (OUTPATIENT)
Dept: MAMMOGRAPHY | Facility: IMAGING CENTER | Age: 66
Discharge: HOME | End: 2021-01-14
Payer: COMMERCIAL

## 2021-01-14 VITALS — BODY MASS INDEX: 42.51 KG/M2 | WEIGHT: 231 LBS | HEIGHT: 62 IN

## 2021-01-14 DIAGNOSIS — Z12.31 ENCOUNTER FOR SCREENING MAMMOGRAM FOR MALIGNANT NEOPLASM OF BREAST: ICD-10-CM

## 2021-01-14 PROCEDURE — 77063 BREAST TOMOSYNTHESIS BI: CPT

## 2021-01-14 PROCEDURE — 77067 SCR MAMMO BI INCL CAD: CPT

## 2021-02-16 DIAGNOSIS — I10 ESSENTIAL HYPERTENSION: ICD-10-CM

## 2021-02-16 RX ORDER — METOPROLOL SUCCINATE 50 MG/1
TABLET, EXTENDED RELEASE ORAL
Qty: 90 TABLET | Refills: 1 | Status: SHIPPED | OUTPATIENT
Start: 2021-02-16 | End: 2021-08-10

## 2021-03-10 DIAGNOSIS — Z23 ENCOUNTER FOR IMMUNIZATION: ICD-10-CM

## 2021-03-23 DIAGNOSIS — E11.9 TYPE 2 DIABETES MELLITUS WITHOUT COMPLICATION, WITHOUT LONG-TERM CURRENT USE OF INSULIN (HCC): ICD-10-CM

## 2021-03-24 RX ORDER — METFORMIN HYDROCHLORIDE 500 MG/1
TABLET, EXTENDED RELEASE ORAL
Qty: 180 TABLET | Refills: 0 | Status: SHIPPED | OUTPATIENT
Start: 2021-03-24 | End: 2021-03-28

## 2021-03-28 DIAGNOSIS — E11.9 TYPE 2 DIABETES MELLITUS WITHOUT COMPLICATION, WITHOUT LONG-TERM CURRENT USE OF INSULIN (HCC): ICD-10-CM

## 2021-03-28 RX ORDER — METFORMIN HYDROCHLORIDE 500 MG/1
TABLET, EXTENDED RELEASE ORAL
Qty: 180 TABLET | Refills: 0 | Status: SHIPPED | OUTPATIENT
Start: 2021-03-28 | End: 2021-09-27

## 2021-04-07 LAB
EST. AVERAGE GLUCOSE BLD GHB EST-MCNC: 147 MG/DL (CALC)
HBA1C MFR BLD: 6.3 % OF TOTAL HGB

## 2021-04-21 ENCOUNTER — HOSPITAL ENCOUNTER (OUTPATIENT)
Dept: BONE DENSITY | Facility: IMAGING CENTER | Age: 66
Discharge: HOME | End: 2021-04-21
Payer: COMMERCIAL

## 2021-04-21 DIAGNOSIS — Z78.0 MENOPAUSE: ICD-10-CM

## 2021-04-21 PROCEDURE — 77080 DXA BONE DENSITY AXIAL: CPT

## 2021-04-22 ENCOUNTER — TELEPHONE (OUTPATIENT)
Dept: FAMILY MEDICINE CLINIC | Facility: CLINIC | Age: 66
End: 2021-04-22

## 2021-04-22 NOTE — TELEPHONE ENCOUNTER
----- Message from Rogelio Blanco DO sent at 4/22/2021  3:17 PM EDT -----  Please call patient   Dex is normal    Left detailed message to patient

## 2021-05-09 DIAGNOSIS — F41.9 ANXIETY: ICD-10-CM

## 2021-05-09 RX ORDER — CITALOPRAM 20 MG/1
TABLET ORAL
Qty: 90 TABLET | Refills: 1 | Status: SHIPPED | OUTPATIENT
Start: 2021-05-09 | End: 2021-06-29

## 2021-06-16 LAB
EST. AVERAGE GLUCOSE BLD GHB EST-MCNC: 140 MG/DL (CALC)
HBA1C MFR BLD: 6.1 % OF TOTAL HGB

## 2021-06-21 ENCOUNTER — OFFICE VISIT (OUTPATIENT)
Dept: FAMILY MEDICINE CLINIC | Facility: CLINIC | Age: 66
End: 2021-06-21
Payer: COMMERCIAL

## 2021-06-21 VITALS
HEART RATE: 70 BPM | WEIGHT: 228.9 LBS | DIASTOLIC BLOOD PRESSURE: 82 MMHG | BODY MASS INDEX: 42.12 KG/M2 | RESPIRATION RATE: 15 BRPM | SYSTOLIC BLOOD PRESSURE: 120 MMHG | HEIGHT: 62 IN | TEMPERATURE: 99 F

## 2021-06-21 DIAGNOSIS — F33.41 RECURRENT MAJOR DEPRESSIVE DISORDER, IN PARTIAL REMISSION (HCC): ICD-10-CM

## 2021-06-21 DIAGNOSIS — E11.9 TYPE 2 DIABETES MELLITUS WITHOUT COMPLICATION, WITHOUT LONG-TERM CURRENT USE OF INSULIN (HCC): Primary | ICD-10-CM

## 2021-06-21 DIAGNOSIS — K63.5 BENIGN COLON POLYP: ICD-10-CM

## 2021-06-21 DIAGNOSIS — E78.2 MIXED HYPERLIPIDEMIA: ICD-10-CM

## 2021-06-21 DIAGNOSIS — E55.9 VITAMIN D DEFICIENCY: ICD-10-CM

## 2021-06-21 DIAGNOSIS — I10 ESSENTIAL HYPERTENSION: ICD-10-CM

## 2021-06-21 DIAGNOSIS — Z23 NEED FOR VACCINATION: ICD-10-CM

## 2021-06-21 PROCEDURE — 3074F SYST BP LT 130 MM HG: CPT | Performed by: FAMILY MEDICINE

## 2021-06-21 PROCEDURE — 90471 IMMUNIZATION ADMIN: CPT

## 2021-06-21 PROCEDURE — 99214 OFFICE O/P EST MOD 30 MIN: CPT | Performed by: FAMILY MEDICINE

## 2021-06-21 PROCEDURE — 3008F BODY MASS INDEX DOCD: CPT | Performed by: FAMILY MEDICINE

## 2021-06-21 PROCEDURE — 90750 HZV VACC RECOMBINANT IM: CPT

## 2021-06-21 PROCEDURE — 3079F DIAST BP 80-89 MM HG: CPT | Performed by: FAMILY MEDICINE

## 2021-06-21 PROCEDURE — 4004F PT TOBACCO SCREEN RCVD TLK: CPT | Performed by: FAMILY MEDICINE

## 2021-06-21 NOTE — PROGRESS NOTES
ASSESSMENT/PLAN:    Type 2 diabetes  A1c excellent 6 1 down from 6 3  We will continue the same medicine that is metformin 500 twice daily as well as her diet and her activity    Hyperlipidemia  Patient is on atorvastatin and ezetimibe  Continue both and check lipids before next visit    Depression  Excellently controlled with citalopram  Continue the same    Colon polyps  Both show no pre cancerous cells  Next colonoscopy October 2025      Hypertension   Blood pressure is very good with metoprolol      Vitamin-D deficiency   Continue vitamin-D 5000 units daily units          A1c in 3 months and call patient with results  Come back in 6 months with screening blood work A1c microalbumin  Also for patient's 1st a [address] called the [facility]    Patient will get Shingrix today  Patient will come back in 2 months and get the 2nd Shingrix      BMI Counseling: Body mass index is 42 55 kg/m²  The BMI is above normal  Nutrition recommendations include decreasing portion sizes and encouraging healthy choices of fruits and vegetables  Exercise recommendations include exercising 3-5 times per week                Health Maintenance   Topic Date Due    Hepatitis C Screening  Never done    HIV Screening  Never done    DTaP,Tdap,and Td Vaccines (2 - Tdap) 05/03/2020    URINE MICROALBUMIN  12/31/2020    BMI: Followup Plan  01/20/2021    Diabetic Foot Exam  05/26/2021    DM Eye Exam  09/24/2021    Cervical Cancer Screening  10/11/2021    Pneumococcal Vaccine: Pediatrics (0 to 5 Years) and At-Risk Patients (6 to 59 Years) (2 of 2 - PPSV23) 12/03/2021    HEMOGLOBIN A1C  12/16/2021    Annual Physical  12/21/2021    Depression Remission PHQ  12/21/2021    BMI: Adult  01/14/2022    MAMMOGRAM  01/14/2022    DXA SCAN  04/21/2024    Colorectal Cancer Screening  12/10/2025    Influenza Vaccine  Completed    COVID-19 Vaccine  Completed    HIB Vaccine  Aged Out    Hepatitis B Vaccine  Aged Out    IPV Vaccine  Aged Out    Hepatitis A Vaccine  Aged Out    Meningococcal ACWY Vaccine  Aged Out    HPV Vaccine  Aged Out         Problem List as of 6/21/2021 Reviewed: 12/21/2020 10:39 AM by Rolanda Romero,     Benign colon polyp    Essential hypertension    Insomnia    Mixed hyperlipidemia    Morbid obesity with BMI of 40 0-44 9, adult ([facility] [address] )    Psoriasis    Recurrent major depressive disorder, in partial remission ([facility] )    Type 2 diabetes mellitus without complication, without long-term current use of insulin ([facility] )    Uncomplicated alcohol abuse    Vitamin D deficiency            Subjective:   Chief Complaint   Patient presents with    Diabetes    Hypertension    Vitamin D Deficiency     Patient is here for her 6 month recheck on her diabetes  Her last A1c was great at 6 3 and this 1 has improved more to 6 1  Since last visit she also had her DEXA done and that was totally normal    She is taking her medication as indicated and has no complaints today  She will be on Medicare come December  patient ID: Erica French is a 60 y o  female  Patient's past medical history, surgical history, family history, social history, and Tobacco history reviewed with patient  MED LIST WAS REVIEWED AND UPDATED    ROS  As per HPI  Rest of 12 point review of systems negative     Objective:      VITALS:  Wt Readings from Last 3 Encounters:   06/21/21 104 kg (228 lb 14 4 oz)   01/14/21 105 kg (231 lb)   12/21/20 105 kg (231 lb 1 6 oz)     BP Readings from Last 3 Encounters:   06/21/21 120/82   12/21/20 116/78   12/10/20 119/89     Pulse Readings from Last 3 Encounters:   06/21/21 70   12/21/20 80   12/10/20 68     Body mass index is 42 55 kg/m²  Laboratory Results:    All pertinent labs and studies were reviewed with patient during this office visit with highlights of the results contained in this note in the ASSESSMENT AND PLAN section       Physical Exam    Constitutional  Appears healthy, Looks well, Appearance consistent with age    Mental Status  Alert, Oriented, Cooperative, Memory function normal , clean, and reasonable    Neck  No neck mass, No thyromegaly, Good carotid upstrokes bilaterally, trachea midline positive click    Respiratory  Breath sounds normal, No rales, No rhonchi, No wheezing, normal palpation    Cardiac   Regular rhythm without ectopy or murmur no S3-S4, no heave lift or thrill to palpation    Vascular  No leg edema, No pedal edema    Muscular skeletal  No clubbing cyanosis , muscle tone normal    Skin  No appreciable rashes or abnormal appearing lesions    Patient's shoes and socks removed  Right Foot/Ankle   Right Foot Inspection  Skin Exam: skin normal and skin intact no dry skin, no warmth, no callus, no erythema, no maceration, no abnormal color, no pre-ulcer, no ulcer and no callus                            Sensory       Monofilament testing: intact  Vascular    The right DP pulse is 2+  The right PT pulse is 2+  Left Foot/Ankle  Left Foot Inspection  Skin Exam: skin normal and skin intactno dry skin, no warmth, no erythema, no maceration, normal color, no pre-ulcer, no ulcer and no callus                                         Sensory       Monofilament: intact  Vascular    The left DP pulse is 2+  The left PT pulse is 2+  Assign Risk Category:  No deformity present; No loss of protective sensation;  No weak pulses       Risk: 0

## 2021-06-21 NOTE — PATIENT INSTRUCTIONS
A1c is excellent continue the good work    Try to get out why the weather is good and walk at least 20 minutes daily    I am proud of you getting her for Shingrix today  We will see you in 2 months for 2nd Shingrix    In 3 months, September, when the kids go back to school, please get her nonfasting A1c    Otherwise we will see you in 6 months, with fasting blood work in urine 1 week prior  We will always see you whenever needed

## 2021-06-28 DIAGNOSIS — F41.9 ANXIETY: ICD-10-CM

## 2021-06-29 RX ORDER — CITALOPRAM 20 MG/1
TABLET ORAL
Qty: 90 TABLET | Refills: 1 | Status: SHIPPED | OUTPATIENT
Start: 2021-06-29 | End: 2022-01-03

## 2021-08-07 DIAGNOSIS — E78.2 MIXED HYPERLIPIDEMIA: ICD-10-CM

## 2021-08-09 RX ORDER — EZETIMIBE 10 MG/1
TABLET ORAL
Qty: 90 TABLET | Refills: 1 | Status: SHIPPED | OUTPATIENT
Start: 2021-08-09 | End: 2022-02-28 | Stop reason: SDUPTHER

## 2021-08-10 DIAGNOSIS — I10 ESSENTIAL HYPERTENSION: ICD-10-CM

## 2021-08-10 RX ORDER — METOPROLOL SUCCINATE 50 MG/1
TABLET, EXTENDED RELEASE ORAL
Qty: 90 TABLET | Refills: 1 | Status: SHIPPED | OUTPATIENT
Start: 2021-08-10 | End: 2022-02-28 | Stop reason: SDUPTHER

## 2021-08-23 ENCOUNTER — CLINICAL SUPPORT (OUTPATIENT)
Dept: FAMILY MEDICINE CLINIC | Facility: CLINIC | Age: 66
End: 2021-08-23
Payer: COMMERCIAL

## 2021-08-23 DIAGNOSIS — Z23 NEED FOR VACCINATION: Primary | ICD-10-CM

## 2021-08-23 PROCEDURE — 90750 HZV VACC RECOMBINANT IM: CPT

## 2021-08-23 PROCEDURE — 90471 IMMUNIZATION ADMIN: CPT

## 2021-09-26 DIAGNOSIS — E11.9 TYPE 2 DIABETES MELLITUS WITHOUT COMPLICATION, WITHOUT LONG-TERM CURRENT USE OF INSULIN (HCC): ICD-10-CM

## 2021-09-27 RX ORDER — METFORMIN HYDROCHLORIDE 500 MG/1
TABLET, EXTENDED RELEASE ORAL
Qty: 180 TABLET | Refills: 0 | Status: SHIPPED | OUTPATIENT
Start: 2021-09-27 | End: 2022-01-11 | Stop reason: SDUPTHER

## 2021-10-05 LAB
EST. AVERAGE GLUCOSE BLD GHB EST-MCNC: 151 MG/DL (CALC)
HBA1C MFR BLD: 6.4 % OF TOTAL HGB

## 2021-12-01 DIAGNOSIS — E78.2 MIXED HYPERLIPIDEMIA: ICD-10-CM

## 2021-12-01 RX ORDER — ATORVASTATIN CALCIUM 80 MG/1
TABLET, FILM COATED ORAL
Qty: 90 TABLET | Refills: 3 | Status: SHIPPED | OUTPATIENT
Start: 2021-12-01 | End: 2022-03-29 | Stop reason: SDUPTHER

## 2022-01-01 DIAGNOSIS — F41.9 ANXIETY: ICD-10-CM

## 2022-01-03 RX ORDER — CITALOPRAM 20 MG/1
TABLET ORAL
Qty: 90 TABLET | Refills: 1 | Status: SHIPPED | OUTPATIENT
Start: 2022-01-03 | End: 2022-01-11 | Stop reason: SDUPTHER

## 2022-01-11 DIAGNOSIS — E11.9 TYPE 2 DIABETES MELLITUS WITHOUT COMPLICATION, WITHOUT LONG-TERM CURRENT USE OF INSULIN (HCC): ICD-10-CM

## 2022-01-11 DIAGNOSIS — F41.9 ANXIETY: ICD-10-CM

## 2022-01-11 RX ORDER — CITALOPRAM 20 MG/1
20 TABLET ORAL DAILY
Qty: 90 TABLET | Refills: 1 | Status: SHIPPED | OUTPATIENT
Start: 2022-01-11 | End: 2022-04-27

## 2022-01-11 RX ORDER — METFORMIN HYDROCHLORIDE 500 MG/1
500 TABLET, EXTENDED RELEASE ORAL 2 TIMES DAILY WITH MEALS
Qty: 180 TABLET | Refills: 0 | Status: SHIPPED | OUTPATIENT
Start: 2022-01-11 | End: 2022-04-04

## 2022-02-25 ENCOUNTER — TELEPHONE (OUTPATIENT)
Dept: FAMILY MEDICINE CLINIC | Facility: CLINIC | Age: 67
End: 2022-02-25

## 2022-02-25 DIAGNOSIS — I10 ESSENTIAL HYPERTENSION: ICD-10-CM

## 2022-02-25 RX ORDER — METOPROLOL SUCCINATE 50 MG/1
50 TABLET, EXTENDED RELEASE ORAL DAILY
Qty: 90 TABLET | Refills: 1 | Status: CANCELLED | OUTPATIENT
Start: 2022-02-25

## 2022-02-25 NOTE — TELEPHONE ENCOUNTER
Patient is asking if she is due for blood work  If so [facility] could please put them in

## 2022-02-28 DIAGNOSIS — E78.2 MIXED HYPERLIPIDEMIA: ICD-10-CM

## 2022-02-28 DIAGNOSIS — I10 ESSENTIAL HYPERTENSION: ICD-10-CM

## 2022-02-28 RX ORDER — EZETIMIBE 10 MG/1
10 TABLET ORAL
Qty: 30 TABLET | Refills: 0 | Status: SHIPPED | OUTPATIENT
Start: 2022-02-28 | End: 2022-03-23

## 2022-02-28 RX ORDER — METOPROLOL SUCCINATE 50 MG/1
50 TABLET, EXTENDED RELEASE ORAL DAILY
Qty: 30 TABLET | Refills: 0 | Status: SHIPPED | OUTPATIENT
Start: 2022-02-28 | End: 2022-03-23

## 2022-02-28 NOTE — TELEPHONE ENCOUNTER
Patient was due for or blood work and an office visit in January   Please ask her to get the blood work done that is in the system and to come in for 30 minute appointment  Only 30 pills of each has been sent in there will be no refills

## 2022-03-04 PROCEDURE — 3061F NEG MICROALBUMINURIA REV: CPT | Performed by: FAMILY MEDICINE

## 2022-03-10 ENCOUNTER — OFFICE VISIT (OUTPATIENT)
Dept: FAMILY MEDICINE CLINIC | Facility: CLINIC | Age: 67
End: 2022-03-10
Payer: COMMERCIAL

## 2022-03-10 VITALS
HEIGHT: 62 IN | RESPIRATION RATE: 16 BRPM | SYSTOLIC BLOOD PRESSURE: 130 MMHG | WEIGHT: 233 LBS | DIASTOLIC BLOOD PRESSURE: 80 MMHG | HEART RATE: 74 BPM | BODY MASS INDEX: 42.88 KG/M2

## 2022-03-10 DIAGNOSIS — G47.00 INSOMNIA, UNSPECIFIED TYPE: ICD-10-CM

## 2022-03-10 DIAGNOSIS — Z12.31 ENCOUNTER FOR SCREENING MAMMOGRAM FOR MALIGNANT NEOPLASM OF BREAST: ICD-10-CM

## 2022-03-10 DIAGNOSIS — F33.41 RECURRENT MAJOR DEPRESSIVE DISORDER, IN PARTIAL REMISSION (HCC): ICD-10-CM

## 2022-03-10 DIAGNOSIS — E55.9 VITAMIN D DEFICIENCY: ICD-10-CM

## 2022-03-10 DIAGNOSIS — E78.2 MIXED HYPERLIPIDEMIA: ICD-10-CM

## 2022-03-10 DIAGNOSIS — Z23 NEED FOR VACCINATION: ICD-10-CM

## 2022-03-10 DIAGNOSIS — D75.1 ERYTHROCYTOSIS: ICD-10-CM

## 2022-03-10 DIAGNOSIS — F10.10 UNCOMPLICATED ALCOHOL ABUSE: ICD-10-CM

## 2022-03-10 DIAGNOSIS — I10 ESSENTIAL HYPERTENSION: ICD-10-CM

## 2022-03-10 DIAGNOSIS — Z00.00 MEDICARE ANNUAL WELLNESS VISIT, SUBSEQUENT: Primary | ICD-10-CM

## 2022-03-10 DIAGNOSIS — E66.01 MORBID OBESITY WITH BMI OF 40.0-44.9, ADULT (HCC): ICD-10-CM

## 2022-03-10 DIAGNOSIS — E11.9 TYPE 2 DIABETES MELLITUS WITHOUT COMPLICATION, WITHOUT LONG-TERM CURRENT USE OF INSULIN (HCC): ICD-10-CM

## 2022-03-10 PROCEDURE — 90732 PPSV23 VACC 2 YRS+ SUBQ/IM: CPT

## 2022-03-10 PROCEDURE — 4004F PT TOBACCO SCREEN RCVD TLK: CPT | Performed by: FAMILY MEDICINE

## 2022-03-10 PROCEDURE — 1090F PRES/ABSN URINE INCON ASSESS: CPT | Performed by: FAMILY MEDICINE

## 2022-03-10 PROCEDURE — 3725F SCREEN DEPRESSION PERFORMED: CPT | Performed by: FAMILY MEDICINE

## 2022-03-10 PROCEDURE — 93000 ELECTROCARDIOGRAM COMPLETE: CPT | Performed by: FAMILY MEDICINE

## 2022-03-10 PROCEDURE — G0009 ADMIN PNEUMOCOCCAL VACCINE: HCPCS

## 2022-03-10 PROCEDURE — 3075F SYST BP GE 130 - 139MM HG: CPT | Performed by: FAMILY MEDICINE

## 2022-03-10 PROCEDURE — 1003F LEVEL OF ACTIVITY ASSESS: CPT | Performed by: FAMILY MEDICINE

## 2022-03-10 PROCEDURE — 4040F PNEUMOC VAC/ADMIN/RCVD: CPT | Performed by: FAMILY MEDICINE

## 2022-03-10 PROCEDURE — 3008F BODY MASS INDEX DOCD: CPT | Performed by: FAMILY MEDICINE

## 2022-03-10 PROCEDURE — 1160F RVW MEDS BY RX/DR IN RCRD: CPT | Performed by: FAMILY MEDICINE

## 2022-03-10 PROCEDURE — 99214 OFFICE O/P EST MOD 30 MIN: CPT | Performed by: FAMILY MEDICINE

## 2022-03-10 PROCEDURE — 1170F FXNL STATUS ASSESSED: CPT | Performed by: FAMILY MEDICINE

## 2022-03-10 PROCEDURE — 3288F FALL RISK ASSESSMENT DOCD: CPT | Performed by: FAMILY MEDICINE

## 2022-03-10 PROCEDURE — 1125F AMNT PAIN NOTED PAIN PRSNT: CPT | Performed by: FAMILY MEDICINE

## 2022-03-10 PROCEDURE — 3079F DIAST BP 80-89 MM HG: CPT | Performed by: FAMILY MEDICINE

## 2022-03-10 NOTE — PATIENT INSTRUCTIONS
1  Please schedule your mammogram and have it done    2  Please discussed living will with her family  We could fill out another when you are here as long she have 2 names of people to act as proxies  Remember that you have to be terminally ill with little to no hope of recovering and 2  You are not able to speak and tell us what you want    3  Please schedule for her diabetic eye checkup    4 really try to get out and walk now that the weather is breaking to really decrease her A1c    Recheck in 4 months with nonfasting A1c and ferritin prior  Recheck sooner if needed                Medicare Preventive Visit Patient Instructions  Thank you for completing your Welcome to Medicare Visit or Medicare Annual Wellness Visit today  Your next wellness visit will be due in one year (3/11/2023)  The screening/preventive services that you may require over the next 5-10 years are detailed below  Some tests may not apply to you based off risk factors and/or age  Screening tests ordered at today's visit but not completed yet may show as past due  Also, please note that scanned in results may not display below  Preventive Screenings:  Service Recommendations Previous Testing/Comments   Colorectal Cancer Screening  * Colonoscopy    * Fecal Occult Blood Test (FOBT)/Fecal Immunochemical Test (FIT)  * Fecal DNA/Cologuard Test  * Flexible Sigmoidoscopy Age: 54-65 years old   Colonoscopy: every 10 years (may be performed more frequently if at higher risk)  OR  FOBT/FIT: every 1 year  OR  Cologuard: every 3 years  OR  Sigmoidoscopy: every 5 years  Screening may be recommended earlier than age 48 if at higher risk for colorectal cancer  Also, an individualized decision between you and your healthcare provider will decide whether screening between the ages of 74-80 would be appropriate   Colonoscopy: 12/10/2020  FOBT/FIT: Not on file  Cologuard: Not on file  Sigmoidoscopy: Not on file    Screening Current     Breast Cancer Screening Age: 36 years old  Frequency: every 1-2 years  Not required if history of left and right mastectomy Mammogram: 01/14/2021    Screening Current   Cervical Cancer Screening Between the ages of 21-29, pap smear recommended once every 3 years  Between the ages of 33-67, can perform pap smear with HPV co-testing every 5 years  Recommendations may differ for women with a history of total hysterectomy, cervical cancer, or abnormal pap smears in past  Pap Smear: 10/11/2018    Screening Not Indicated   Hepatitis C Screening Once for adults born between 1945 and 1965  More frequently in patients at high risk for Hepatitis C Hep C Antibody: Not on file    Screening Current   Diabetes Screening 1-2 times per year if you're at risk for diabetes or have pre-diabetes Fasting glucose: No results in last 5 years   A1C: 6 5 % of total Hgb    Screening Not Indicated  History Diabetes   Cholesterol Screening Once every 5 years if you don't have a lipid disorder  May order more often based on risk factors  Lipid panel: 03/04/2022    Screening Not Indicated  History Lipid Disorder     Other Preventive Screenings Covered by Medicare:  1  Abdominal Aortic Aneurysm (AAA) Screening: covered once if your at risk  You're considered to be at risk if you have a family history of AAA  2  Lung Cancer Screening: covers low dose CT scan once per year if you meet all of the following conditions: (1) Age 50-69; (2) No signs or symptoms of lung cancer; (3) Current smoker or have quit smoking within the last 15 years; (4) You have a tobacco smoking history of at least 30 pack years (packs per day multiplied by number of years you smoked); (5) You get a written order from a healthcare provider  3  Glaucoma Screening: covered annually if you're considered high risk: (1) You have diabetes OR (2) Family history of glaucoma OR (3)  aged 48 and older OR (3)  American aged 72 and older  3   Osteoporosis Screening: covered every 2 years if you meet one of the following conditions: (1) You're estrogen deficient and at risk for osteoporosis based off medical history and other findings; (2) Have a vertebral abnormality; (3) On glucocorticoid therapy for more than 3 months; (4) Have primary hyperparathyroidism; (5) On osteoporosis medications and need to assess response to drug therapy  · Last bone density test (DXA Scan): 04/21/2021   5  HIV Screening: covered annually if you're between the age of 15-65  Also covered annually if you are younger than 13 and older than 72 with risk factors for HIV infection  For pregnant patients, it is covered up to 3 times per pregnancy  Immunizations:  Immunization Recommendations   Influenza Vaccine Annual influenza vaccination during flu season is recommended for all persons aged >= 6 months who do not have contraindications   Pneumococcal Vaccine (Prevnar and Pneumovax)  * Prevnar = PCV13  * Pneumovax = PPSV23   Adults 25-60 years old: 1-3 doses may be recommended based on certain risk factors  Adults 72 years old: Prevnar (PCV13) vaccine recommended followed by Pneumovax (PPSV23) vaccine  If already received PPSV23 since turning 65, then PCV13 recommended at least one year after PPSV23 dose  Hepatitis B Vaccine 3 dose series if at intermediate or high risk (ex: diabetes, end stage renal disease, liver disease)   Tetanus (Td) Vaccine - COST NOT COVERED BY MEDICARE PART B Following completion of primary series, a booster dose should be given every 10 years to maintain immunity against tetanus  Td may also be given as tetanus wound prophylaxis  Tdap Vaccine - COST NOT COVERED BY MEDICARE PART B Recommended at least once for all adults  For pregnant patients, recommended with each pregnancy     Shingles Vaccine (Shingrix) - COST NOT COVERED BY MEDICARE PART B  2 shot series recommended in those aged 48 and above     Health Maintenance Due:      Topic Date Due    Cervical Cancer Screening  10/11/2021  Breast Cancer Screening: Mammogram  01/14/2022    HIV Screening  03/10/2024 (Originally 12/3/1970)    Hepatitis C Screening  04/10/2024 (Originally 1955)    DXA SCAN  04/21/2024    Colorectal Cancer Screening  12/10/2025     Immunizations Due:      Topic Date Due    DTaP,Tdap,and Td Vaccines (2 - Tdap) 05/03/2020    COVID-19 Vaccine (3 - Booster) 08/20/2021    Pneumococcal Vaccine: 65+ Years (2 of 2 - PPSV23) 12/03/2021     Advance Directives   What are advance directives? Advance directives are legal documents that state your wishes and plans for medical care  These plans are made ahead of time in case you lose your ability to make decisions for yourself  Advance directives can apply to any medical decision, such as the treatments you want, and if you want to donate organs  What are the types of advance directives? There are many types of advance directives, and each state has rules about how to use them  You may choose a combination of any of the following:  · Living will: This is a written record of the treatment you want  You can also choose which treatments you do not want, which to limit, and which to stop at a certain time  This includes surgery, medicine, IV fluid, and tube feedings  · Durable power of  for healthcare [facility]): This is a written record that states who you want to make healthcare choices for you when you are unable to make them for yourself  This person, called a proxy, is usually a family member or a friend  You may choose more than 1 proxy  · Do not resuscitate (DNR) order:  A DNR order is used in case your heart stops beating or you stop breathing  It is a request not to have certain forms of treatment, such as CPR  A DNR order may be included in other types of advance directives  · Medical directive: This covers the care that you want if you are in a coma, near death, or unable to make decisions for yourself   You can list the treatments you want for each condition  Treatment may include pain medicine, surgery, blood transfusions, dialysis, IV or tube feedings, and a ventilator (breathing machine)  · Values history: This document has questions about your views, beliefs, and how you feel and think about life  This information can help others choose the care that you would choose  Why are advance directives important? An advance directive helps you control your care  Although spoken wishes may be used, it is better to have your wishes written down  Spoken wishes can be misunderstood, or not followed  Treatments may be given even if you do not want them  An advance directive may make it easier for your family to make difficult choices about your care  Urinary Incontinence   Urinary incontinence (UI)  is when you lose control of your bladder  UI develops because your bladder cannot store or empty urine properly  The 3 most common types of UI are stress incontinence, urge incontinence, or both  Medicines:   · May be given to help strengthen your bladder control  Report any side effects of medication to your healthcare provider  Do pelvic muscle exercises often:  Your pelvic muscles help you stop urinating  Squeeze these muscles tight for 5 seconds, then relax for 5 seconds  Gradually work up to squeezing for 10 seconds  Do 3 sets of 15 repetitions a day, or as directed  This will help strengthen your pelvic muscles and improve bladder control  Train your bladder:  Go to the bathroom at set times, such as every 2 hours, even if you do not feel the urge to go  You can also try to hold your urine when you feel the urge to go  For example, hold your urine for 5 minutes when you feel the urge to go  As that becomes easier, hold your urine for 10 minutes  Self-care:   · Keep a UI record  Write down how often you leak urine and how much you leak  Make a note of what you were doing when you leaked urine  · Drink liquids as directed   You may need to limit the amount of liquid you drink to help control your urine leakage  Do not drink any liquid right before you go to bed  Limit or do not have drinks that contain caffeine or alcohol  · Prevent constipation  Eat a variety of high-fiber foods  Good examples are high-fiber cereals, beans, vegetables, and whole-grain breads  Walking is the best way to trigger your intestines to have a bowel movement  · Exercise regularly and maintain a healthy weight  Weight loss and exercise will decrease pressure on your bladder and help you control your leakage  · Use a catheter as directed  to help empty your bladder  A catheter is a tiny, plastic tube that is put into your bladder to drain your urine  · Go to behavior therapy as directed  Behavior therapy may be used to help you learn to control your urge to urinate  Cigarette Smoking and Your Health   Risks to your health if you smoke:  Nicotine and other chemicals found in tobacco damage every cell in your body  Even if you are a light smoker, you have an increased risk for cancer, heart disease, and lung disease  If you are pregnant or have diabetes, smoking increases your risk for complications  Benefits to your health if you stop smoking:   · You decrease respiratory symptoms such as coughing, wheezing, and shortness of breath  · You reduce your risk for cancers of the lung, mouth, throat, kidney, bladder, pancreas, stomach, and cervix  If you already have cancer, you increase the benefits of chemotherapy  You also reduce your risk for cancer returning or a second cancer from developing  · You reduce your risk for heart disease, blood clots, heart attack, and stroke  · You reduce your risk for lung infections, and diseases such as pneumonia, asthma, chronic bronchitis, and emphysema  · Your circulation improves  More oxygen can be delivered to your body  If you have diabetes, you lower your risk for complications, such as kidney, artery, and eye diseases   You also lower your risk for nerve damage  Nerve damage can lead to amputations, poor vision, and blindness  · You improve your body's ability to heal and to fight infections  For more information and support to stop smoking:   · [employer]  Phone: 5- 159 - 121-9646  Web Address: sourceasy  Weight Management   Why it is important to manage your weight:  Being overweight increases your risk of health conditions such as heart disease, high blood pressure, type 2 diabetes, and certain types of cancer  It can also increase your risk for osteoarthritis, sleep apnea, and other respiratory problems  Aim for a slow, steady weight loss  Even a small amount of weight loss can lower your risk of health problems  How to lose weight safely:  A safe and healthy way to lose weight is to eat fewer calories and get regular exercise  You can lose up about 1 pound a week by decreasing the number of calories you eat by 500 calories each day  Healthy meal plan for weight management:  A healthy meal plan includes a variety of foods, contains fewer calories, and helps you stay healthy  A healthy meal plan includes the following:  · Eat whole-grain foods more often  A healthy meal plan should contain fiber  Fiber is the part of grains, fruits, and vegetables that is not broken down by your body  Whole-grain foods are healthy and provide extra fiber in your diet  Some examples of whole-grain foods are whole-wheat breads and pastas, oatmeal, brown rice, and bulgur  · Eat a variety of vegetables every day  Include dark, leafy greens such as spinach, kale, julian greens, and mustard greens  Eat yellow and orange vegetables such as carrots, sweet potatoes, and winter squash  · Eat a variety of fruits every day  Choose fresh or canned fruit (canned in its own juice or light syrup) instead of juice  Fruit juice has very little or no fiber  · Eat low-fat dairy foods  Drink fat-free (skim) milk or 1% milk   Eat fat-free yogurt and low-fat cottage cheese  Try low-fat cheeses such as mozzarella and other reduced-fat cheeses  · Choose meat and other protein foods that are low in fat  Choose beans or other legumes such as split peas or lentils  Choose fish, skinless poultry (chicken or turkey), or lean cuts of red meat (beef or pork)  Before you cook meat or poultry, cut off any visible fat  · Use less fat and oil  Try baking foods instead of frying them  Add less fat, such as margarine, sour cream, regular salad dressing and mayonnaise to foods  Eat fewer high-fat foods  Some examples of high-fat foods include french fries, doughnuts, ice cream, and cakes  · Eat fewer sweets  Limit foods and drinks that are high in sugar  This includes candy, cookies, regular soda, and sweetened drinks  Exercise:  Exercise at least 30 minutes per day on most days of the week  Some examples of exercise include walking, biking, dancing, and swimming  You can also fit in more physical activity by taking the stairs instead of the elevator or parking farther away from stores  Ask your healthcare provider about the best exercise plan for you  Alcohol Use and Your Health    Drinking too much can harm your health  Excessive alcohol use leads to about 88,000 death in the [place] each year, and shortens the life of those who diet by almost 30 years  Further, excessive drinking cost the economy $249 billion in 2010  Most excessive drinkers are not alcohol dependent  Excessive alcohol use has immediate effects that increase the risk of many harmful health conditions  These are most often the result of binge drinking  Over time, excessive alcohol use can lead to the development of chronic diseases and other series health problems  What is considered a "drink"? Excessive alcohol use includes:  · Binge Drinking: For women, 4 or more drinks consumed on one occasion  For men, 5 or more drinks consumed on one occasion  · Heavy Drinking:  For women, 8 or more drinks per week  For men, 15 or more drinks per week  · Any alcohol used by pregnant women  · Any alcohol used by those under the age of 21 years    If you choose to drink, do so in moderation:  · Do not drink at all if you are under the age of 24, or if you are or may be pregnant, or have health problems that could be made worse by drinking  · For women, up to 1 drink per day  · For men, up to 2 drinks a day    No one should begin drinking or drink more frequently based on potential health benefits    Short-Term Health Risks:  · Injuries: motor vehicle crashes, falls, drownings, burns  · Violence: homicide, suicide, sexual assault, intimate partner violence  · Alcohol poisoning  · Reproductive health: risky sexual behaviors, unintended prengnacy, sexually transmitted diseases, miscarriage, stillbirth, fetal alcohol syndrome    Long-Term Health Risks:  · Chronic diseases: high blood pressure, heart disease, stroke, liver disease, digestive problems  · Cancers: breast, mouth and throat, liver, colon  · Learning and memory problems: dementia, poor school performance  · Mental health: depression, anxiety, insomnia  · Social problems: lost productivity, family problems, unemployment  · Alcohol dependence    For support and more information:  · Substance Abuse and [facility] , [address] [place]  Web Address: https://[employer]/    · Alcoholics Anonymous        Web Address: http://www [employer]/    https://www cdc gov/alcohol/fact-sheets/alcohol-use htm     © Copyright [employer] 2018 Information is for End User's use only and may not be sold, redistributed or otherwise used for commercial purposes   All illustrations and images included in CareNotes® are the copyrighted property of A [employer] A M [employer] Inc  or [facility] [employer]

## 2022-03-10 NOTE — PROGRESS NOTES
Assessment and Plan:   1  Pneumovax 23 today  2  Patient will get a slip for mammogram  3  All of her other preventative screening test are up-to-date  Problem List Items Addressed This Visit     None      Visit Diagnoses     Encounter for screening mammogram for malignant neoplasm of breast    -  Primary    Relevant Orders    Mammo screening bilateral w 3d & cad           Preventive health issues were discussed with patient, and age appropriate screening tests were ordered as noted in patient's After Visit Summary  Personalized health advice and appropriate referrals for health education or preventive services given if needed, as noted in patient's After Visit Summary  History of Present Illness:     [facility]       Patient Care Team:  Kristen Valle DO as PCP - General  Kristen Valle DO as PCP - [address] [place], [place] [address]Th [facility] ([facility])  Kristen Valle DO as PCP - [facility]CP-[facility] ([facility])  Kristen Valle DO     Review of Systems:     Review of Systems     Patient's 10 point review of systems negative except for history of chief complaint       Problem List:     Patient Active Problem List   Diagnosis    Benign colon polyp    Recurrent major depressive disorder, in partial remission ([facility] )    Type 2 diabetes mellitus without complication, without long-term current use of insulin ([facility] )    Essential hypertension    Mixed hyperlipidemia    Insomnia    Psoriasis    Uncomplicated alcohol abuse    Vitamin D deficiency    Morbid obesity with BMI of 40 0-44 9, adult [facility])      Past Medical and Surgical History:     Past Medical History:   Diagnosis Date    Hypertension     Insomnia     Last assessed: 4/13/17     Past Surgical History:   Procedure Laterality Date    BREAST BIOPSY Left 05/26/2016    benign    BREAST EXCISIONAL BIOPSY Right 1974    benign    US GUIDED BREAST BIOPSY LEFT COMPLETE Left 5/26/2016      Family History:     Family History   Problem Relation Age of Onset    Emphysema Mother     Alzheimer's disease Father     Hypertension Father     Alcohol abuse Brother     Cancer Sister         unknown primary    No Known Problems Maternal Grandmother     No Known Problems Maternal Grandfather     No Known Problems Paternal Grandmother     No Known Problems Paternal Grandfather     No Known Problems Maternal Aunt     Colon cancer Brother 61    No Known Problems Paternal Aunt     No Known Problems Paternal Aunt     No Known Problems Paternal Aunt     No Known Problems Paternal Aunt     No Known Problems Paternal Aunt     Substance Abuse Neg Hx       Social History:     Social History     Socioeconomic History    Marital status: /Civil Union     Spouse name: None    Number of children: None    Years of education: None    Highest education level: None   Occupational History    None   Tobacco Use    Smoking status: Current Every Day Smoker     Packs/day: 1 50     Types: Cigarettes    Smokeless tobacco: Never Used   Vaping Use    Vaping Use: Never used   Substance and Sexual Activity    Alcohol use: Yes     Comment: Social Per Allcripts: 5-6 mixzed drinks every other weekend    Drug use: No    Sexual activity: None   Other Topics Concern    None   Social History Narrative    Coffee consumption: 1 cup daily    Uses safety equipment: seatbelts     Social Determinants of Health     Financial Resource Strain: Not on file   Food Insecurity: Not on file   Transportation Needs: Not on file   Physical Activity: Not on file   Stress: Not on file   Social Connections: Not on file   Intimate Partner Violence: Not on file   Housing Stability: Not on file      Medications and Allergies:     Current Outpatient Medications   Medication Sig Dispense Refill    atorvastatin (LIPITOR) 80 mg tablet TAKE ONE TABLET BY MOUTH EVERY DAY 90 tablet 3    citalopram (CeleXA) 20 mg tablet Take 1 tablet (20 mg total) by mouth daily 90 tablet 1    ezetimibe (ZETIA) 10 mg tablet Take 1 tablet (10 mg total) by mouth daily at bedtime 30 tablet 0    hydrocortisone (Proctozone-HC) 2 5 % rectal cream Insert into the rectum 2 (two) times a day (Patient taking differently: Insert into the rectum as needed  ) 30 g 0    Hypromellose (ARTIFICIAL TEARS OP) Apply to eye as needed        metFORMIN (GLUCOPHAGE-XR) 500 mg 24 hr tablet Take 1 tablet (500 mg total) by mouth 2 (two) times a day with meals 180 tablet 0    metoprolol succinate (TOPROL-XL) 50 mg 24 hr tablet Take 1 tablet (50 mg total) by mouth daily 30 tablet 0    VITAMIN D, ERGOCALCIFEROL, PO Take by mouth  No current facility-administered medications for this visit  No Known Allergies   Immunizations:     Immunization History   Administered Date(s) Administered    COVID-19, unspecified 02/27/2021, 03/20/2021    DTaP 5 05/03/2010    INFLUENZA 10/10/2016, 11/20/2017, 12/03/2021    Influenza Quadrivalent, 6-35 Months IM 10/10/2016, 11/20/2017    Influenza, injectable, quadrivalent, preservative free 0 5 mL 10/23/2020    Influenza, recombinant, quadrivalent,injectable, preservative free 08/23/2018, 10/17/2019    Pneumococcal Conjugate 13-Valent 10/10/2016    Pneumococcal Polysaccharide PPV23 04/29/2014    Zoster 07/18/2017    Zoster Vaccine Recombinant 06/21/2021, 08/23/2021      Health Maintenance:         Topic Date Due    Cervical Cancer Screening  10/11/2021    Breast Cancer Screening: Mammogram  01/14/2022    HIV Screening  03/10/2024 (Originally 12/3/1970)    Hepatitis C Screening  04/10/2024 (Originally 1955)    DXA SCAN  04/21/2024    Colorectal Cancer Screening  12/10/2025         Topic Date Due    DTaP,Tdap,and Td Vaccines (2 - Tdap) 05/03/2020    COVID-19 Vaccine (3 - Booster) 08/20/2021    Pneumococcal Vaccine: 65+ Years (2 of 2 - PPSV23) 12/03/2021      Medicare Screening Tests and Risk Assessments:     Izaiah Finley is here for her Welcome to Medicare visit       Health Risk Assessment:   Patient rates overall health as good  Patient feels that their physical health rating is same  Patient is satisfied with their life  Eyesight was rated as same  Hearing was rated as same  Patient feels that their emotional and mental health rating is same  Patients states they are sometimes angry  Patient states they are sometimes unusually tired/fatigued  Pain experienced in the last 7 days has been none  Patient states that she has experienced no weight loss or gain in last 6 months  Depression Screening:   PHQ-9 Score: 3      Fall Risk Screening: In the past year, patient has experienced: no history of falling in past year      Urinary Incontinence Screening:   Patient has leaked urine accidently in the last six months  Home Safety:  Patient does not have trouble with stairs inside or outside of their home  Patient has working smoke alarms and has no working carbon monoxide detector  Home safety hazards include: none  Nutrition:   Current diet is Regular  Medications:   Patient is currently taking over-the-counter supplements  OTC medications include: vitamin D  Patient is able to manage medications  Activities of Daily Living (ADLs)/Instrumental Activities of Daily Living (IADLs):   Walk and transfer into and out of bed and chair?: Yes  Dress and groom yourself?: Yes    Bathe or shower yourself?: Yes    Feed yourself?  Yes  Do your laundry/housekeeping?: Yes  Manage your money, pay your bills and track your expenses?: Yes  Make your own meals?: Yes    Do your own shopping?: Yes    Previous Hospitalizations:   Any hospitalizations or ED visits within the last 12 months?: No      Advance Care Planning:   Living will: No    Durable POA for healthcare: No    Advanced directive: No    End of Life Decisions reviewed with patient: Yes      Cognitive Screening:   Provider or family/friend/caregiver concerned regarding cognition?: No    PREVENTIVE SCREENINGS      Cardiovascular Screening:    General: Screening Not Indicated and History Lipid Disorder      Diabetes Screening:     General: Screening Not Indicated and History Diabetes      Colorectal Cancer Screening:     General: Screening Current      Breast Cancer Screening:     General: Screening Current      Cervical Cancer Screening:    General: Screening Not Indicated      Osteoporosis Screening:    General: Screening Current      Abdominal Aortic Aneurysm (AAA) Screening:        General: Screening Not Indicated      Lung Cancer Screening:     General: Screening Not Indicated      Hepatitis C Screening:    General: Screening Current    Screening, Brief Intervention, and Referral to Treatment (SBIRT)    Screening  Typical number of drinks in a day: 1  Typical number of drinks in a week: 7  Interpretation: Low risk drinking behavior  AUDIT-C Screenin) How often did you have a drink containing alcohol in the past year? 2 to 3 times a week  2) How many drinks did you have on a typical day when you were drinking in the past year? 1 to 2  3) How often did you have 6 or more drinks on one occasion in the past year? never    AUDIT-C Score: 3  Interpretation: Score 3-12 (female): POSITIVE screen for alcohol misuse    AUDIT Screenin) How often during the last year have you found that you were not able to stop drinking once you had started? 0 - never  5) How often during the last year have you failed to do what was normally expected from you because of drinking? 0 - never  6) How often during the last year have you needed a first drink in the morning to get yourself going after a heavy drinking session?  0 - never  7) How often during the last year have you had a feeling of guilt or remorse after drinking? 0 - never  8) How often during the last year have you been unable to remember what happened the night before because you had been drinking? 0 - never  9) Have you or someone else been injured as a result of your drinking? 0 - no  10) Has a relative or friend or a doctor or another health worker been concerned about your drinking or suggested you cut down? 0 - no    AUDIT Score: 3  Interpretation: Low risk alcohol consumption    Single Item Drug Screening:  How often have you used an illegal drug (including marijuana) or a prescription medication for non-medical reasons in the past year? never    Single Item Drug Screen Score: 0  Interpretation: Negative screen for possible drug use disorder    Brief Intervention  Healthy alcohol use/limits discussed  Other Counseling Topics:   Regular weightbearing exercise  Physical Exam:     /80   Pulse 74   Resp 16   Ht 5' 1 75" (1 568 m)   Wt 106 kg (233 lb)   BMI 42 96 kg/m²     Physical Exam       Gen  No acute distress well-appearing well-nourished appears stated age    Mental status  Good judgment and insight oriented to time person and place, recent and remote memory intact mood and affect normal cooperative and patient is reasonable    HEENT  PERRLA 3 mm, EOMI without nystagmus, normocephalic atraumatic without facial weakness      Neck   supple no masses trachea midline positive click normal carotid upstrokes with no bruits    Cor  Regular rhythm without ectopy or murmur, no S3-S4, normal palpation that is no heave lift or thrill    Vascular  No edema, good pedal pulses    Lungs  CTA bilaterally in no respiratory distress no wheezes rhonchi or rales, normal to palpation no tactile fremitus    Abdomen  Soft, no palpable masses, no hepatosplenomegaly, normal bowel sounds, nontender    Lymphatics  No palpable nodes in the neck, supraclavicular area, axilla, or groin     Musculoskeletal  No clubbing cyanosis or edema muscle tone normal    Skin  no rashes or abnormal appearing lesions    Neuro  Normal ambulation, cranial nerves 2-12 grossly intact, higher functioning with reasoning intact            Enirco Felt, DO

## 2022-03-10 NOTE — PROGRESS NOTES
ASSESSMENT/PLAN:    Type 2 diabetes  Stable 6 5 from 6 4  But it is slowly climbing  Patient is on metformin 500 twice a day  She will try to watch her diet increase activity  If it continues to go above 6 5 we will need to increase her metformin  Patient will work on her eye exam    Hyperlipidemia  Cholesterol is excellent at up 166, LDL 83, and triglycerides 160  Continue atorvastatin and ezetimibe with good results    Elevated RBCs/erythrocytosis  Has had these in the past  Most likely due to smoking  We have checked ferritin in the past and we will do it again with her next A1c    Depression  Has been doing very well with citalopram continue the same    Colon polyps  Next colonoscopy October 2025    Hypertension   Blood pressure is very good with metoprolol  EKG today      Vitamin-D deficiency   Continue vitamin-D 5000 units daily units  Vitamin-D levels are normal      Recheck in 4 months  Ferritin A1c prior nonfasting  Pneumovax 23 today  Return sooner if needed             How often during the last year have you found that you were not able to stop drinking once you had started?: 0 - never  How often during the last year have you failed to do what was normally expected from you because of drinking?: 0 - never  How often during the last year have you needed a first drink in the morning to get yourself going after a heavy drinking session?: 0 - never  How often during the last year have you had a feeling of guilt or remorse after drinking?: 0 - never  How often during the last year have you been unable to remember what happened the night before because you had been drinking?: 0 - never  Have you or someone else been injured as a result of your drinking?: 0 - no  Has a relative or friend or a doctor or another health worker been concerned about your drinking or suggested you cut down?: 0 - no  How would you rate your overall health?: good  Compared to last year, how is your physical health?: same  In general, how satisfied are you with your life?: satisfied  Compared to last year, how is your eyesight?: same  Compared to last year, how is your hearing?: same  Compared to last year, how is your emotional/mental health?: same  How often is anger a problem for you?: sometimes  How often do you feel unusually tired/fatigued?: sometimes  In the past 7 days, how much pain have you experienced?: none  In the past 6 months, have you lost or gained 10 pounds without trying?: No  One or more falls in the last year: No  In the past 6 months, have you accidentally leaked urine?: Yes  Do you have trouble with the stairs inside or outside your home?: No  Does your home have working smoke alarms?: Yes  Does your home have a carbon monoxide monitor?: No  Which safety hazards (if any) have you experienced in your home? Please select all that apply : none  How would you describe your current diet?  Please select all that apply : Regular  In addition to prescription medications, are you taking any over-the-counter supplements?: Yes  If yes, what supplements are you taking?: vitamin D  Can you manage your medications?: Yes  Are you currently taking any opioid medications?: No  Can you walk and transfer into and out of your bed and chair?: Yes  Can you dress and groom yourself?: Yes  Can you bathe or shower yourself?: Yes  Can you feed yourself?: Yes  Can you do your laundry/ housekeeping?: Yes  Can you manage your money, pay your bills, and track your expenses?: Yes  Can you make your own meals?: Yes  Can you do your own shopping?: Yes  Within the last 12 months, have you had any hospitalizations or Emergency Department visits?: No  Do you have a living will?: No  Do you have a Durable POA (Power of ) for healthcare decisions?: No  Do you have an Advanced Directive for end of life decisions?: No  How often have you used an illegal drug (including marijuana) or a prescription medication for non-medical reasons in the past year?: never  What is the typical number of drinks you consume in a day?: 1  What is the typical number of drinks you consume in a week?: 7  How often did you have a drink containing alcohol in the past year?: 2 to 3 times a week  How many drinks did you have on a typical day  when you were drinking in the past year?: 1 to 2  How often did you have 6 or more drinks on one occasion in the past year?: never      BMI Counseling: Body mass index is 42 96 kg/m²  The BMI is above normal  Nutrition recommendations include decreasing portion sizes and encouraging healthy choices of fruits and vegetables  Exercise recommendations include exercising 3-5 times per week  Rationale for BMI follow-up plan is due to patient being overweight or obese              Health Maintenance   Topic Date Due    Medicare Annual Wellness Visit (AWV)  Never done    DTaP,Tdap,and Td Vaccines (2 - Tdap) 05/03/2020    DM Eye Exam  09/24/2021    Cervical Cancer Screening  10/11/2021    Pneumococcal Vaccine: 65+ Years (2 of 2 - PPSV23) 12/03/2021    Breast Cancer Screening: Mammogram  01/14/2022    BMI: Followup Plan  06/21/2022    HIV Screening  03/10/2024 (Originally 12/3/1970)    Hepatitis C Screening  04/10/2024 (Originally 1955)    Diabetic Foot Exam  06/21/2022    HEMOGLOBIN A1C  09/04/2022    URINE MICROALBUMIN  03/04/2023    Fall Risk  03/10/2023    BMI: Adult  03/10/2023    Depression Remission PHQ  03/10/2023    DXA SCAN  04/21/2024    Colorectal Cancer Screening  12/10/2025    Osteoporosis Screening  Completed    Influenza Vaccine  Completed    COVID-19 Vaccine  Completed    HIB Vaccine  Aged Out    Hepatitis B Vaccine  Aged Out    IPV Vaccine  Aged Out    Hepatitis A Vaccine  Aged Out    Meningococcal ACWY Vaccine  Aged Out    HPV Vaccine  Aged Out         Problem List as of 3/10/2022 Reviewed: 6/21/2021  9:25 AM by Lakhwinder Spencer, DO    Benign colon polyp    Essential hypertension    Insomnia    Mixed hyperlipidemia Morbid obesity with BMI of 40 0-44 9, adult (HCC)    Psoriasis    Recurrent major depressive disorder, in partial remission ([facility] )    Type 2 diabetes mellitus without complication, without long-term current use of insulin ([facility] )    Uncomplicated alcohol abuse    Vitamin D deficiency            Subjective:   Chief Complaint   Patient presents with    Medicare Wellness Visit    Diabetes    Hypertension    Hyperlipidemia     Patient is here for her diabetic recheck  She had all of her fasting blood work done as well    She also has her 1st annual wellness visit today  She know she is due for her eye exam as well    She has gotten orders in the pass for screening CT scan but has not gotten them done  patient ID: Doris Dugan is a 73 y o  female  Patient's past medical history, surgical history, family history, social history, and Tobacco history reviewed with patient  MED LIST WAS REVIEWED AND UPDATED    ROS  As per HPI  Rest of 12 point review of systems negative     Objective:      VITALS:  Wt Readings from Last 3 Encounters:   03/10/22 106 kg (233 lb)   06/21/21 104 kg (228 lb 14 4 oz)   01/14/21 105 kg (231 lb)     BP Readings from Last 3 Encounters:   03/10/22 130/80   06/21/21 120/82   12/21/20 116/78     Pulse Readings from Last 3 Encounters:   03/10/22 74   06/21/21 70   12/21/20 80     Body mass index is 42 96 kg/m²  Laboratory Results: All pertinent labs and studies were reviewed with patient during this office visit with highlights of the results contained in this note in the ASSESSMENT AND PLAN section       Physical Exam    Gen    No acute distress well-appearing well-nourished appears stated age    Mental status  Good judgment and insight oriented to time person and place, recent and remote memory intact mood and affect normal cooperative and patient is reasonable    HEENT  PERRLA 3 mm, EOMI without nystagmus, normocephalic atraumatic without facial weakness      Neck   supple no masses trachea midline positive click normal carotid upstrokes with no bruits    Cor  Regular rhythm without ectopy or murmur, no S3-S4, normal palpation that is no heave lift or thrill    Vascular  No edema, good pedal pulses    Lungs  CTA bilaterally in no respiratory distress no wheezes rhonchi or rales, normal to palpation no tactile fremitus    Abdomen  Soft, no palpable masses, no hepatosplenomegaly, normal bowel sounds, nontender    Lymphatics  No palpable nodes in the neck, supraclavicular area, axilla, or groin     Musculoskeletal  No clubbing cyanosis or edema muscle tone normal    Skin  no rashes or abnormal appearing lesions    Neuro  Normal ambulation, cranial nerves 2-12 grossly intact, higher functioning with reasoning intact

## 2022-03-23 DIAGNOSIS — E78.2 MIXED HYPERLIPIDEMIA: ICD-10-CM

## 2022-03-23 DIAGNOSIS — I10 ESSENTIAL HYPERTENSION: ICD-10-CM

## 2022-03-23 RX ORDER — EZETIMIBE 10 MG/1
TABLET ORAL
Qty: 30 TABLET | Refills: 0 | Status: SHIPPED | OUTPATIENT
Start: 2022-03-23 | End: 2022-04-17

## 2022-03-23 RX ORDER — METOPROLOL SUCCINATE 50 MG/1
TABLET, EXTENDED RELEASE ORAL
Qty: 30 TABLET | Refills: 0 | Status: SHIPPED | OUTPATIENT
Start: 2022-03-23 | End: 2022-04-17

## 2022-03-29 DIAGNOSIS — E78.2 MIXED HYPERLIPIDEMIA: ICD-10-CM

## 2022-03-31 RX ORDER — ATORVASTATIN CALCIUM 80 MG/1
80 TABLET, FILM COATED ORAL DAILY
Qty: 90 TABLET | Refills: 3 | Status: SHIPPED | OUTPATIENT
Start: 2022-03-31

## 2022-04-03 DIAGNOSIS — E11.9 TYPE 2 DIABETES MELLITUS WITHOUT COMPLICATION, WITHOUT LONG-TERM CURRENT USE OF INSULIN (HCC): ICD-10-CM

## 2022-04-04 RX ORDER — METFORMIN HYDROCHLORIDE 500 MG/1
TABLET, EXTENDED RELEASE ORAL
Qty: 180 TABLET | Refills: 0 | Status: SHIPPED | OUTPATIENT
Start: 2022-04-04 | End: 2022-04-27

## 2022-04-11 ENCOUNTER — TELEPHONE (OUTPATIENT)
Dept: FAMILY MEDICINE CLINIC | Facility: CLINIC | Age: 67
End: 2022-04-11

## 2022-04-11 NOTE — TELEPHONE ENCOUNTER
Patient has lab orders for A1c and ferritin  Does she need an appointment to review these labs?     [phone]

## 2022-04-12 NOTE — TELEPHONE ENCOUNTER
Viktor Lam,  Her blood do a total week before her next visit in July  I am not sure which she is asking since she has always had blood work a week before an appointment for her diabetes for years

## 2022-04-15 ENCOUNTER — HOSPITAL ENCOUNTER (OUTPATIENT)
Dept: MAMMOGRAPHY | Facility: IMAGING CENTER | Age: 67
Discharge: HOME | End: 2022-04-15
Payer: COMMERCIAL

## 2022-04-15 VITALS — HEIGHT: 62 IN | WEIGHT: 240 LBS | BODY MASS INDEX: 44.16 KG/M2

## 2022-04-15 DIAGNOSIS — Z12.31 ENCOUNTER FOR SCREENING MAMMOGRAM FOR MALIGNANT NEOPLASM OF BREAST: ICD-10-CM

## 2022-04-15 PROCEDURE — 77067 SCR MAMMO BI INCL CAD: CPT

## 2022-04-15 PROCEDURE — 77063 BREAST TOMOSYNTHESIS BI: CPT

## 2022-04-17 DIAGNOSIS — E78.2 MIXED HYPERLIPIDEMIA: ICD-10-CM

## 2022-04-17 DIAGNOSIS — I10 ESSENTIAL HYPERTENSION: ICD-10-CM

## 2022-04-17 RX ORDER — METOPROLOL SUCCINATE 50 MG/1
TABLET, EXTENDED RELEASE ORAL
Qty: 30 TABLET | Refills: 0 | Status: SHIPPED | OUTPATIENT
Start: 2022-04-17 | End: 2022-05-20

## 2022-04-17 RX ORDER — EZETIMIBE 10 MG/1
TABLET ORAL
Qty: 30 TABLET | Refills: 0 | Status: SHIPPED | OUTPATIENT
Start: 2022-04-17 | End: 2022-05-20

## 2022-04-26 DIAGNOSIS — F41.9 ANXIETY: ICD-10-CM

## 2022-04-26 DIAGNOSIS — E11.9 TYPE 2 DIABETES MELLITUS WITHOUT COMPLICATION, WITHOUT LONG-TERM CURRENT USE OF INSULIN (HCC): ICD-10-CM

## 2022-04-27 RX ORDER — CITALOPRAM 20 MG/1
TABLET ORAL
Qty: 90 TABLET | Refills: 1 | Status: SHIPPED | OUTPATIENT
Start: 2022-04-27

## 2022-04-27 RX ORDER — METFORMIN HYDROCHLORIDE 500 MG/1
TABLET, EXTENDED RELEASE ORAL
Qty: 180 TABLET | Refills: 0 | Status: SHIPPED | OUTPATIENT
Start: 2022-04-27

## 2022-05-16 LAB
LEFT EYE DIABETIC RETINOPATHY: NORMAL
RIGHT EYE DIABETIC RETINOPATHY: NORMAL

## 2022-05-20 DIAGNOSIS — E78.2 MIXED HYPERLIPIDEMIA: ICD-10-CM

## 2022-05-20 DIAGNOSIS — I10 ESSENTIAL HYPERTENSION: ICD-10-CM

## 2022-05-20 RX ORDER — EZETIMIBE 10 MG/1
TABLET ORAL
Qty: 30 TABLET | Refills: 0 | Status: SHIPPED | OUTPATIENT
Start: 2022-05-20 | End: 2022-06-18

## 2022-05-20 RX ORDER — METOPROLOL SUCCINATE 50 MG/1
TABLET, EXTENDED RELEASE ORAL
Qty: 30 TABLET | Refills: 0 | Status: SHIPPED | OUTPATIENT
Start: 2022-05-20 | End: 2022-06-18

## 2022-06-18 DIAGNOSIS — E78.2 MIXED HYPERLIPIDEMIA: ICD-10-CM

## 2022-06-18 DIAGNOSIS — I10 ESSENTIAL HYPERTENSION: ICD-10-CM

## 2022-06-18 RX ORDER — EZETIMIBE 10 MG/1
TABLET ORAL
Qty: 90 TABLET | Refills: 1 | Status: SHIPPED | OUTPATIENT
Start: 2022-06-18

## 2022-06-18 RX ORDER — METOPROLOL SUCCINATE 50 MG/1
TABLET, EXTENDED RELEASE ORAL
Qty: 90 TABLET | Refills: 1 | Status: SHIPPED | OUTPATIENT
Start: 2022-06-18

## 2022-07-09 LAB
EST. AVERAGE GLUCOSE BLD GHB EST-MCNC: 147 MG/DL (CALC)
FERRITIN SERPL-MCNC: 24 NG/ML (ref 16–288)
HBA1C MFR BLD: 6.3 % OF TOTAL HGB

## 2022-07-09 PROCEDURE — 3044F HG A1C LEVEL LT 7.0%: CPT | Performed by: FAMILY MEDICINE

## 2022-07-11 ENCOUNTER — RA CDI HCC (OUTPATIENT)
Dept: OTHER | Facility: HOSPITAL | Age: 67
End: 2022-07-11

## 2022-07-11 NOTE — PROGRESS NOTES
[facility]  coding opportunities       Chart reviewed, no opportunity found:   [address]        Patients Insurance     Medicare Insurance: [facility]

## 2022-07-28 ENCOUNTER — OFFICE VISIT (OUTPATIENT)
Dept: FAMILY MEDICINE CLINIC | Facility: CLINIC | Age: 67
End: 2022-07-28
Payer: COMMERCIAL

## 2022-07-28 VITALS
HEIGHT: 62 IN | WEIGHT: 231.3 LBS | BODY MASS INDEX: 42.57 KG/M2 | HEART RATE: 75 BPM | DIASTOLIC BLOOD PRESSURE: 80 MMHG | SYSTOLIC BLOOD PRESSURE: 126 MMHG | RESPIRATION RATE: 15 BRPM | OXYGEN SATURATION: 98 %

## 2022-07-28 DIAGNOSIS — E11.9 TYPE 2 DIABETES MELLITUS WITHOUT COMPLICATION, WITHOUT LONG-TERM CURRENT USE OF INSULIN (HCC): ICD-10-CM

## 2022-07-28 DIAGNOSIS — Z13.5: ICD-10-CM

## 2022-07-28 DIAGNOSIS — E78.2 MIXED HYPERLIPIDEMIA: ICD-10-CM

## 2022-07-28 DIAGNOSIS — Z91.89 AT RISK FOR SLEEP APNEA: ICD-10-CM

## 2022-07-28 DIAGNOSIS — F33.41 RECURRENT MAJOR DEPRESSIVE DISORDER, IN PARTIAL REMISSION (HCC): ICD-10-CM

## 2022-07-28 DIAGNOSIS — E55.9 VITAMIN D DEFICIENCY: ICD-10-CM

## 2022-07-28 DIAGNOSIS — I10 ESSENTIAL HYPERTENSION: ICD-10-CM

## 2022-07-28 DIAGNOSIS — Z00.00 MEDICARE ANNUAL WELLNESS VISIT, SUBSEQUENT: Primary | ICD-10-CM

## 2022-07-28 DIAGNOSIS — D75.1 ERYTHROCYTOSIS: ICD-10-CM

## 2022-07-28 DIAGNOSIS — Z13.6 ENCOUNTER FOR ABDOMINAL AORTIC ANEURYSM SCREENING: ICD-10-CM

## 2022-07-28 DIAGNOSIS — F10.10 UNCOMPLICATED ALCOHOL ABUSE: ICD-10-CM

## 2022-07-28 PROCEDURE — 3079F DIAST BP 80-89 MM HG: CPT | Performed by: FAMILY MEDICINE

## 2022-07-28 PROCEDURE — 3288F FALL RISK ASSESSMENT DOCD: CPT | Performed by: FAMILY MEDICINE

## 2022-07-28 PROCEDURE — 1160F RVW MEDS BY RX/DR IN RCRD: CPT | Performed by: FAMILY MEDICINE

## 2022-07-28 PROCEDURE — G0438 PPPS, INITIAL VISIT: HCPCS | Performed by: FAMILY MEDICINE

## 2022-07-28 PROCEDURE — 1090F PRES/ABSN URINE INCON ASSESS: CPT | Performed by: FAMILY MEDICINE

## 2022-07-28 PROCEDURE — 99215 OFFICE O/P EST HI 40 MIN: CPT | Performed by: FAMILY MEDICINE

## 2022-07-28 PROCEDURE — 1125F AMNT PAIN NOTED PAIN PRSNT: CPT | Performed by: FAMILY MEDICINE

## 2022-07-28 PROCEDURE — 1170F FXNL STATUS ASSESSED: CPT | Performed by: FAMILY MEDICINE

## 2022-07-28 PROCEDURE — 3074F SYST BP LT 130 MM HG: CPT | Performed by: FAMILY MEDICINE

## 2022-07-28 NOTE — PROGRESS NOTES
ASSESSMENT/PLAN:    High risk for sleep apnea  Neck measurement 15 cm   Patient does to your but no witnessed apnea   Evaluate for 6 sleep apnea     Type 2 diabetes  A1c continues to improve 6 3 from 6 5  Continue metformin 500 twice a day  Recheck in 4 months    hyperlipidemia   Cholesterol 166 LDL 83  Continue atorvastatin and ezetimibe I would and diet    Erythrocytosis  Ferritin is normal and low normal at 24 next likely most likely due to smoking  Discussed proper use of nicotine lozenges to help her stop smoking      Depression  Has been doing very well with citalopram continue the same     Colon polyps  Next colonoscopy October 2025     Hypertension   Blood pressure is very good with metoprolol      Vitamin-D deficiency   Continue vitamin-D 5000 units daily units  Vitamin-D levels are normal        Recheck in 4 months    A1c prior    Return sooner if needed    Tobacco Cessation Counseling: Tobacco cessation counseling was provided  The patient is sincerely urged to quit consumption of tobacco  She is ready to quit tobacco  Medication options and side effects of medication discussed    Nicotine lozenges         Health Maintenance   Topic Date Due    Medicare Annual Wellness Visit (AWV)  Never done    DM Eye Exam  09/24/2021    Cervical Cancer Screening  10/11/2021    COVID-19 Vaccine (4 - Booster for Pfizer series) 04/03/2022    Diabetic Foot Exam  06/21/2022    Influenza Vaccine (1) 09/01/2022    HIV Screening  03/10/2024 (Originally 12/3/1970)    Hepatitis C Screening  04/10/2024 (Originally 1955)    HEMOGLOBIN A1C  01/08/2023    URINE MICROALBUMIN  03/04/2023    BMI: Followup Plan  03/10/2023    Depression Remission PHQ  03/10/2023    BMI: Adult  04/15/2023    Breast Cancer Screening: Mammogram  04/15/2023    Fall Risk  07/28/2023    DXA SCAN  04/21/2024    Colorectal Cancer Screening  12/10/2025    Osteoporosis Screening  Completed    Pneumococcal Vaccine: 65+ Years  Completed    HIB Vaccine  Aged Out    Hepatitis B Vaccine  Aged Out    IPV Vaccine  Aged Out    Hepatitis A Vaccine  Aged Out    Meningococcal ACWY Vaccine  Aged Out    HPV Vaccine  Aged Out         Problem List as of 7/28/2022 Reviewed: 6/21/2021  9:25 AM by Delia Olsen DO    Benign colon polyp    Erythrocytosis    Essential hypertension    Insomnia    Mixed hyperlipidemia    Morbid obesity with BMI of 40 0-44 9, adult (HCC)    Psoriasis    Recurrent major depressive disorder, in partial remission ([facility] )    Type 2 diabetes mellitus without complication, without long-term current use of insulin ([facility] )    Uncomplicated alcohol abuse    Vitamin D deficiency            Subjective:   Chief Complaint   Patient presents with    Diabetes    Hypertension    Hyperlipidemia    Vitamin D Deficiency    Medicare Wellness Visit      Patient is here for her 4 month recheck her diabetes     She had her blood work done for us as well    She really has no complaints      patient ID: Jeanna Bernstein is a 73 y o  female  Patient's past medical history, surgical history, family history, social history, and Tobacco history reviewed with patient  MED LIST WAS REVIEWED AND UPDATED    ROS  As per HPI  Rest of 12 point review of systems negative     Objective:      VITALS:  Wt Readings from Last 3 Encounters:   07/28/22 105 kg (231 lb 4 8 oz)   04/15/22 109 kg (240 lb)   03/10/22 106 kg (233 lb)     BP Readings from Last 3 Encounters:   07/28/22 126/80   03/10/22 130/80   06/21/21 120/82     Pulse Readings from Last 3 Encounters:   07/28/22 75   03/10/22 74   06/21/21 70     Body mass index is 42 65 kg/m²  Laboratory Results: All pertinent labs and studies were reviewed with patient during this office visit with highlights of the results contained in this note in the ASSESSMENT AND PLAN section       Physical Exam    Gen    No acute distress well-appearing well-nourished appears stated age    Mental status  Good judgment and insight oriented to time person and place, recent and remote memory intact mood and affect normal cooperative and patient is reasonable    HEENT  PERRLA 3 mm, EOMI without nystagmus, normocephalic atraumatic without facial weakness      Neck   supple no masses trachea midline positive click normal carotid upstrokes with no bruits    Cor  Regular rhythm without ectopy or murmur, no S3-S4, normal palpation that is no heave lift or thrill    Vascular  No edema, good pedal pulses    Lungs  CTA bilaterally in no respiratory distress no wheezes rhonchi or rales, normal to palpation no tactile fremitus    Abdomen  Soft, no palpable masses, no hepatosplenomegaly, normal bowel sounds, nontender    Lymphatics  No palpable nodes in the neck, supraclavicular area, axilla, or groin     Musculoskeletal  No clubbing cyanosis or edema muscle tone normal    Skin  no rashes or abnormal appearing lesions    Neuro  Normal ambulation, cranial nerves 2-12 grossly intact, higher functioning with reasoning intact  Patient's shoes and socks removed  Right Foot/Ankle   Right Foot Inspection  Skin Exam: skin normal and skin intact  No dry skin, no warmth, no callus, no erythema, no maceration, no abnormal color, no pre-ulcer, no ulcer and no callus  Sensory   Monofilament testing: intact    Vascular  The right DP pulse is 0  The right PT pulse is 2+  Left Foot/Ankle  Left Foot Inspection  Skin Exam: skin normal and skin intact  No dry skin, no warmth, no erythema, no maceration, normal color, no pre-ulcer, no ulcer and no callus  Sensory   Monofilament testing: intact    Vascular  The left DP pulse is 0  The left PT pulse is 2+       Assign Risk Category  No deformity present  No loss of protective sensation  No weak pulses  Risk: 0

## 2022-07-28 NOTE — PROGRESS NOTES
Assessment and Plan:   1  Patient will discuss living will with her family   2  Patient will get abdominal aortic aneurysm screening and we will call with results  Problem List Items Addressed This Visit     Erythrocytosis    Essential hypertension    Mixed hyperlipidemia    Recurrent major depressive disorder, in partial remission ([facility] )    Type 2 diabetes mellitus without complication, without long-term current use of insulin ([facility] )    Uncomplicated alcohol abuse    Vitamin D deficiency      Other Visit Diagnoses     Medicare annual wellness visit, subsequent    -  Primary    Encounter for abdominal aortic aneurysm screening        Relevant Orders    US abdominal aorta screening aaa          Tobacco Cessation Counseling: Tobacco cessation counseling was provided  The patient is sincerely urged to quit consumption of tobacco  She is not ready to quit tobacco      Preventive health issues were discussed with patient, and age appropriate screening tests were ordered as noted in patient's After Visit Summary  Personalized health advice and appropriate referrals for health education or preventive services given if needed, as noted in patient's After Visit Summary       History of Present Illness:     Patient presents for a Medicare Wellness Visit    HPI   Patient Care Team:  [place] Sm[place], DO as PCP - General  [place], DO as PCP - [address] [place], [place] [id number] [facility] ([facility])  [place] Sm[place], DO as PCP - [facility] ([facility])  [place] Sm[place], DO     Review of Systems:     Review of Systems     Problem List:     Patient Active Problem List   Diagnosis    Benign colon polyp    Recurrent major depressive disorder, in partial remission ([facility] )    Type 2 diabetes mellitus without complication, without long-term current use of insulin ([facility] )    Essential hypertension    Mixed hyperlipidemia    Insomnia    Psoriasis    Uncomplicated alcohol abuse    Vitamin D deficiency    Morbid obesity with BMI of 40 0-44 9, adult ([facility] )    Erythrocytosis Past Medical and Surgical History:     Past Medical History:   Diagnosis Date    Hypertension     Insomnia     Last assessed: 4/13/17     Past Surgical History:   Procedure Laterality Date    BREAST BIOPSY Left 05/26/2016    benign    BREAST EXCISIONAL BIOPSY Right 1974    benign    US GUIDED BREAST BIOPSY LEFT COMPLETE Left 5/26/2016      Family History:     Family History   Problem Relation Age of Onset    Emphysema Mother     Alzheimer's disease Father     Hypertension Father     Alcohol abuse Brother     Cancer Sister         unknown primary-late 52's    No Known Problems Maternal Grandmother     No Known Problems Maternal Grandfather     No Known Problems Paternal Grandmother     No Known Problems Paternal Grandfather     No Known Problems Maternal Aunt     Colon cancer Brother 61    No Known Problems Paternal Aunt     No Known Problems Paternal Aunt     No Known Problems Paternal Aunt     No Known Problems Paternal Aunt     No Known Problems Paternal Aunt     Substance Abuse Neg Hx       Social History:     Social History     Socioeconomic History    Marital status: /Civil Union     Spouse name: None    Number of children: None    Years of education: None    Highest education level: None   Occupational History    None   Tobacco Use    Smoking status: Current Every Day Smoker     Packs/day: 1 50     Types: Cigarettes    Smokeless tobacco: Never Used   Vaping Use    Vaping Use: Never used   Substance and Sexual Activity    Alcohol use: Yes     Comment: Social Per Allcripts: 5-6 mixzed drinks every other weekend    Drug use: No    Sexual activity: None   Other Topics Concern    None   Social History Narrative    Coffee consumption: 1 cup daily    Uses safety equipment: seatbelts     Social Determinants of Health     Financial Resource Strain: Not on file   Food Insecurity: Not on file   Transportation Needs: Not on file   Physical Activity: Not on file   Stress: Not on file   Social Connections: Not on file   Intimate Partner Violence: Not on file   Housing Stability: Not on file      Medications and Allergies:     Current Outpatient Medications   Medication Sig Dispense Refill    ezetimibe (ZETIA) 10 mg tablet TAKE 1 TABLET BY MOUTH EVERYDAY AT BEDTIME 90 tablet 1    atorvastatin (LIPITOR) 80 mg tablet Take 1 tablet (80 mg total) by mouth daily 90 tablet 3    citalopram (CeleXA) 20 mg tablet TAKE 1 TABLET BY MOUTH EVERY DAY 90 tablet 1    hydrocortisone (Proctozone-HC) 2 5 % rectal cream Insert into the rectum 2 (two) times a day (Patient taking differently: Insert into the rectum as needed  ) 30 g 0    hydrocortisone 2 5 % cream INSERT INTO THE RECTUM 2 (TWO) TIMES A DAY 28 35 g 1    Hypromellose (ARTIFICIAL TEARS OP) Apply to eye as needed        metFORMIN (GLUCOPHAGE-XR) 500 mg 24 hr tablet TAKE 1 TABLET BY MOUTH TWICE A DAY WITH MEALS 180 tablet 0    metoprolol succinate (TOPROL-XL) 50 mg 24 hr tablet TAKE 1 TABLET BY MOUTH EVERY DAY 90 tablet 1    VITAMIN D, ERGOCALCIFEROL, PO Take by mouth  No current facility-administered medications for this visit       No Known Allergies   Immunizations:     Immunization History   Administered Date(s) Administered    COVID-19 PFIZER VACCINE 0 3 ML IM 02/27/2021, 03/20/2021, 12/03/2021    DTaP 5 05/03/2010    INFLUENZA 10/10/2016, 11/20/2017, 12/03/2021    Influenza Quadrivalent, 6-35 Months IM 10/10/2016, 11/20/2017    Influenza, injectable, quadrivalent, preservative free 0 5 mL 10/23/2020    Influenza, recombinant, quadrivalent,injectable, preservative free 08/23/2018, 10/17/2019    Pneumococcal Conjugate 13-Valent 10/10/2016    Pneumococcal Polysaccharide PPV23 04/29/2014, 03/10/2022    Zoster 07/18/2017    Zoster Vaccine Recombinant 06/21/2021, 08/23/2021      Health Maintenance:         Topic Date Due    Cervical Cancer Screening  10/11/2021    HIV Screening  03/10/2024 (Originally 12/3/1970)   [facility] Hepatitis C Screening  04/10/2024 (Originally 1955)    Breast Cancer Screening: Mammogram  04/15/2023    DXA SCAN  04/21/2024    Colorectal Cancer Screening  12/10/2025         Topic Date Due    COVID-19 Vaccine (4 - Booster for Pfizer series) 04/03/2022    Influenza Vaccine (1) 09/01/2022      Medicare Screening Tests and Risk Assessments:     Juana Wallace is here for her Welcome to Medicare visit  Health Risk Assessment:   Patient rates overall health as good  Patient feels that their physical health rating is same  Patient is satisfied with their life  Eyesight was rated as same  Hearing was rated as same  Patient feels that their emotional and mental health rating is same  Patients states they are never, rarely angry  Patient states they are sometimes unusually tired/fatigued  Pain experienced in the last 7 days has been none  Patient states that she has experienced no weight loss or gain in last 6 months  Fall Risk Screening: In the past year, patient has experienced: no history of falling in past year      Urinary Incontinence Screening:   Patient has not leaked urine accidently in the last six months  Home Safety:  Patient does not have trouble with stairs inside or outside of their home  Patient has working smoke alarms and has working carbon monoxide detector  Home safety hazards include: none  Nutrition:   Current diet is Regular and Limited junk food  Medications:   Patient is currently taking over-the-counter supplements  OTC medications include: see medication list  Patient is able to manage medications  Activities of Daily Living (ADLs)/Instrumental Activities of Daily Living (IADLs):   Walk and transfer into and out of bed and chair?: Yes  Dress and groom yourself?: Yes    Bathe or shower yourself?: Yes    Feed yourself?  Yes  Do your laundry/housekeeping?: Yes  Manage your money, pay your bills and track your expenses?: Yes  Make your own meals?: Yes    Do your own shopping?: Yes    Previous Hospitalizations:   Any hospitalizations or ED visits within the last 12 months?: No      Advance Care Planning:   Living will: No    End of Life Decisions reviewed with patient: Yes      Comments: Pt to discuss with family    Cognitive Screening:   Provider or family/friend/caregiver concerned regarding cognition?: No    PREVENTIVE SCREENINGS      Cardiovascular Screening:    General: Screening Not Indicated and History Lipid Disorder      Diabetes Screening:     General: Screening Not Indicated and History Diabetes      Colorectal Cancer Screening:     General: Screening Current      Breast Cancer Screening:     General: Screening Current      Cervical Cancer Screening:    General: Screening Not Indicated      Osteoporosis Screening:    General: Screening Current      Lung Cancer Screening:     General: Screening Not Indicated      Hepatitis C Screening:    General: Screening Current    Screening, Brief Intervention, and Referral to Treatment (SBIRT)    Screening  Typical number of drinks in a day: 0  Typical number of drinks in a week: 3  Interpretation: Low risk drinking behavior      Single Item Drug Screening:  How often have you used an illegal drug (including marijuana) or a prescription medication for non-medical reasons in the past year? never    Single Item Drug Screen Score: 0  Interpretation: Negative screen for possible drug use disorder    No exam data present   Constitutional  Appears healthy, Looks well, Appearance consistent with age    Mental Status  Alert, Oriented, Cooperative, Memory function normal , clean, and reasonable    Neck  No neck mass, No thyromegaly, Good carotid upstrokes bilaterally, trachea midline positive click    Respiratory  Breath sounds normal, No rales, No rhonchi, No wheezing, normal palpation    Cardiac   Regular rhythm without ectopy or murmur no S3-S4, no heave lift or thrill to palpation    Vascular  No leg edema, No pedal edema    Muscular skeletal  No clubbing cyanosis , muscle tone normal    Skin  No appreciable rashes or abnormal appearing lesions        Physical Exam:     /80   Pulse 75   Resp 15   Ht 5' 1 75" (1 568 m)   Wt 105 kg (231 lb 4 8 oz)   SpO2 98%   BMI 42 65 kg/m²         Rogelio Blanco DO

## 2022-07-28 NOTE — PATIENT INSTRUCTIONS
1  Schedule yourself for the AAA screen to look for an aneurysm in her belly  We will call with results    2  Please talked her family about her living will     3  Please use the nicotine lozenges as we discussed,  when the urge to not smoke comes take out the laws under and save it for later     4  Please schedule sleep study     See you back in 4 months with nonfasting blood work or sooner if needed                      Medicare Preventive Visit Patient Instructions  Thank you for completing your Welcome to Medicare Visit or Medicare Annual Wellness Visit today  Your next wellness visit will be due in one year (7/29/2023)  The screening/preventive services that you may require over the next 5-10 years are detailed below  Some tests may not apply to you based off risk factors and/or age  Screening tests ordered at today's visit but not completed yet may show as past due  Also, please note that scanned in results may not display below  Preventive Screenings:  Service Recommendations Previous Testing/Comments   Colorectal Cancer Screening  * Colonoscopy    * Fecal Occult Blood Test (FOBT)/Fecal Immunochemical Test (FIT)  * Fecal DNA/Cologuard Test  * Flexible Sigmoidoscopy Age: 54-65 years old   Colonoscopy: every 10 years (may be performed more frequently if at higher risk)  OR  FOBT/FIT: every 1 year  OR  Cologuard: every 3 years  OR  Sigmoidoscopy: every 5 years  Screening may be recommended earlier than age 48 if at higher risk for colorectal cancer  Also, an individualized decision between you and your healthcare provider will decide whether screening between the ages of 74-80 would be appropriate   Colonoscopy: 12/10/2020  FOBT/FIT: Not on file  Cologuard: Not on file  Sigmoidoscopy: Not on file    Screening Current     Breast Cancer Screening Age: 36 years old  Frequency: every 1-2 years  Not required if history of left and right mastectomy Mammogram: 04/15/2022    Screening Current   Cervical Cancer Screening Between the ages of 21-29, pap smear recommended once every 3 years  Between the ages of 33-67, can perform pap smear with HPV co-testing every 5 years  Recommendations may differ for women with a history of total hysterectomy, cervical cancer, or abnormal pap smears in past  Pap Smear: 10/11/2018    Screening Not Indicated   Hepatitis C Screening Once for adults born between 1945 and 1965  More frequently in patients at high risk for Hepatitis C Hep C Antibody: Not on file    Screening Current   Diabetes Screening 1-2 times per year if you're at risk for diabetes or have pre-diabetes Fasting glucose: No results in last 5 years   A1C: 6 3 % of total Hgb    Screening Not Indicated  History Diabetes   Cholesterol Screening Once every 5 years if you don't have a lipid disorder  May order more often based on risk factors  Lipid panel: 03/04/2022    Screening Not Indicated  History Lipid Disorder     Other Preventive Screenings Covered by Medicare:  Abdominal Aortic Aneurysm (AAA) Screening: covered once if your at risk  You're considered to be at risk if you have a family history of AAA  Lung Cancer Screening: covers low dose CT scan once per year if you meet all of the following conditions: (1) Age 50-69; (2) No signs or symptoms of lung cancer; (3) Current smoker or have quit smoking within the last 15 years; (4) You have a tobacco smoking history of at least 30 pack years (packs per day multiplied by number of years you smoked); (5) You get a written order from a healthcare provider    Glaucoma Screening: covered annually if you're considered high risk: (1) You have diabetes OR (2) Family history of glaucoma OR (3)  aged 48 and older OR (3)  American aged 72 and older  Osteoporosis Screening: covered every 2 years if you meet one of the following conditions: (1) You're estrogen deficient and at risk for osteoporosis based off medical history and other findings; (2) Have a vertebral abnormality; (3) On glucocorticoid therapy for more than 3 months; (4) Have primary hyperparathyroidism; (5) On osteoporosis medications and need to assess response to drug therapy  Last bone density test (DXA Scan): 04/21/2021  HIV Screening: covered annually if you're between the age of 12-76  Also covered annually if you are younger than 13 and older than 72 with risk factors for HIV infection  For pregnant patients, it is covered up to 3 times per pregnancy  Immunizations:  Immunization Recommendations   Influenza Vaccine Annual influenza vaccination during flu season is recommended for all persons aged >= 6 months who do not have contraindications   Pneumococcal Vaccine (Prevnar and Pneumovax)  * Prevnar = PCV13  * Pneumovax = PPSV23   Adults 25-60 years old: 1-3 doses may be recommended based on certain risk factors  Adults 72 years old: Prevnar (PCV13) vaccine recommended followed by Pneumovax (PPSV23) vaccine  If already received PPSV23 since turning 65, then PCV13 recommended at least one year after PPSV23 dose  Hepatitis B Vaccine 3 dose series if at intermediate or high risk (ex: diabetes, end stage renal disease, liver disease)   Tetanus (Td) Vaccine - COST NOT COVERED BY MEDICARE PART B Following completion of primary series, a booster dose should be given every 10 years to maintain immunity against tetanus  Td may also be given as tetanus wound prophylaxis  Tdap Vaccine - COST NOT COVERED BY MEDICARE PART B Recommended at least once for all adults  For pregnant patients, recommended with each pregnancy     Shingles Vaccine (Shingrix) - COST NOT COVERED BY MEDICARE PART B  2 shot series recommended in those aged 48 and above     Health Maintenance Due:      Topic Date Due    Cervical Cancer Screening  10/11/2021    HIV Screening  03/10/2024 (Originally 12/3/1970)    Hepatitis C Screening  04/10/2024 (Originally 1955)    Breast Cancer Screening: Mammogram  04/15/2023    DXA SCAN 04/21/2024    Colorectal Cancer Screening  12/10/2025     Immunizations Due:      Topic Date Due    COVID-19 Vaccine (4 - Booster for Pfizer series) 04/03/2022    Influenza Vaccine (1) 09/01/2022     Advance Directives   What are advance directives? Advance directives are legal documents that state your wishes and plans for medical care  These plans are made ahead of time in case you lose your ability to make decisions for yourself  Advance directives can apply to any medical decision, such as the treatments you want, and if you want to donate organs  What are the types of advance directives? There are many types of advance directives, and each state has rules about how to use them  You may choose a combination of any of the following:  Living will: This is a written record of the treatment you want  You can also choose which treatments you do not want, which to limit, and which to stop at a certain time  This includes surgery, medicine, IV fluid, and tube feedings  Durable power of  for healthcare [facility]): This is a written record that states who you want to make healthcare choices for you when you are unable to make them for yourself  This person, called a proxy, is usually a family member or a friend  You may choose more than 1 proxy  Do not resuscitate (DNR) order:  A DNR order is used in case your heart stops beating or you stop breathing  It is a request not to have certain forms of treatment, such as CPR  A DNR order may be included in other types of advance directives  Medical directive: This covers the care that you want if you are in a coma, near death, or unable to make decisions for yourself  You can list the treatments you want for each condition  Treatment may include pain medicine, surgery, blood transfusions, dialysis, IV or tube feedings, and a ventilator (breathing machine)  Values history:   This document has questions about your views, beliefs, and how you feel and think about life  This information can help others choose the care that you would choose  Why are advance directives important? An advance directive helps you control your care  Although spoken wishes may be used, it is better to have your wishes written down  Spoken wishes can be misunderstood, or not followed  Treatments may be given even if you do not want them  An advance directive may make it easier for your family to make difficult choices about your care  Cigarette Smoking and Your Health   Risks to your health if you smoke:  Nicotine and other chemicals found in tobacco damage every cell in your body  Even if you are a light smoker, you have an increased risk for cancer, heart disease, and lung disease  If you are pregnant or have diabetes, smoking increases your risk for complications  Benefits to your health if you stop smoking: You decrease respiratory symptoms such as coughing, wheezing, and shortness of breath  You reduce your risk for cancers of the lung, mouth, throat, kidney, bladder, pancreas, stomach, and cervix  If you already have cancer, you increase the benefits of chemotherapy  You also reduce your risk for cancer returning or a second cancer from developing  You reduce your risk for heart disease, blood clots, heart attack, and stroke  You reduce your risk for lung infections, and diseases such as pneumonia, asthma, chronic bronchitis, and emphysema  Your circulation improves  More oxygen can be delivered to your body  If you have diabetes, you lower your risk for complications, such as kidney, artery, and eye diseases  You also lower your risk for nerve damage  Nerve damage can lead to amputations, poor vision, and blindness  You improve your body's ability to heal and to fight infections  For more information and support to stop smoking:   [employer]  Phone: 6- 037 - [phone]  Web Address: www [employer]com  Weight Management   Why it is important to manage your weight:  Being overweight increases your risk of health conditions such as heart disease, high blood pressure, type 2 diabetes, and certain types of cancer  It can also increase your risk for osteoarthritis, sleep apnea, and other respiratory problems  Aim for a slow, steady weight loss  Even a small amount of weight loss can lower your risk of health problems  How to lose weight safely:  A safe and healthy way to lose weight is to eat fewer calories and get regular exercise  You can lose up about 1 pound a week by decreasing the number of calories you eat by 500 calories each day  Healthy meal plan for weight management:  A healthy meal plan includes a variety of foods, contains fewer calories, and helps you stay healthy  A healthy meal plan includes the following:  Eat whole-grain foods more often  A healthy meal plan should contain fiber  Fiber is the part of grains, fruits, and vegetables that is not broken down by your body  Whole-grain foods are healthy and provide extra fiber in your diet  Some examples of whole-grain foods are whole-wheat breads and pastas, oatmeal, brown rice, and bulgur  Eat a variety of vegetables every day  Include dark, leafy greens such as spinach, kale, julian greens, and mustard greens  Eat yellow and orange vegetables such as carrots, sweet potatoes, and winter squash  Eat a variety of fruits every day  Choose fresh or canned fruit (canned in its own juice or light syrup) instead of juice  Fruit juice has very little or no fiber  Eat low-fat dairy foods  Drink fat-free (skim) milk or 1% milk  Eat fat-free yogurt and low-fat cottage cheese  Try low-fat cheeses such as mozzarella and other reduced-fat cheeses  Choose meat and other protein foods that are low in fat  Choose beans or other legumes such as split peas or lentils  Choose fish, skinless poultry (chicken or turkey), or lean cuts of red meat (beef or pork)  Before you cook meat or poultry, cut off any visible fat     Use less fat and oil  Try baking foods instead of frying them  Add less fat, such as margarine, sour cream, regular salad dressing and mayonnaise to foods  Eat fewer high-fat foods  Some examples of high-fat foods include french fries, doughnuts, ice cream, and cakes  Eat fewer sweets  Limit foods and drinks that are high in sugar  This includes candy, cookies, regular soda, and sweetened drinks  Exercise:  Exercise at least 30 minutes per day on most days of the week  Some examples of exercise include walking, biking, dancing, and swimming  You can also fit in more physical activity by taking the stairs instead of the elevator or parking farther away from stores  Ask your healthcare provider about the best exercise plan for you  © Copyright [employer] 2018 Information is for End User's use only and may not be sold, redistributed or otherwise used for commercial purposes   All illustrations and images included in CareNotes® are the copyrighted property of A [employer] A M [employer] Inc  or [address] [place] [address]

## 2022-09-05 DIAGNOSIS — E11.9 TYPE 2 DIABETES MELLITUS WITHOUT COMPLICATION, WITHOUT LONG-TERM CURRENT USE OF INSULIN (HCC): ICD-10-CM

## 2022-09-06 RX ORDER — METFORMIN HYDROCHLORIDE 500 MG/1
TABLET, EXTENDED RELEASE ORAL
Qty: 180 TABLET | Refills: 0 | Status: SHIPPED | OUTPATIENT
Start: 2022-09-06

## 2022-09-28 ENCOUNTER — TELEPHONE (OUTPATIENT)
Dept: PULMONOLOGY | Facility: CLINIC | Age: 67
End: 2022-09-28

## 2022-12-11 DIAGNOSIS — F41.9 ANXIETY: ICD-10-CM

## 2022-12-11 RX ORDER — CITALOPRAM 20 MG/1
TABLET ORAL
Qty: 90 TABLET | Refills: 1 | Status: SHIPPED | OUTPATIENT
Start: 2022-12-11

## 2022-12-12 DIAGNOSIS — E11.9 TYPE 2 DIABETES MELLITUS WITHOUT COMPLICATION, WITHOUT LONG-TERM CURRENT USE OF INSULIN (HCC): ICD-10-CM

## 2022-12-12 RX ORDER — METFORMIN HYDROCHLORIDE 500 MG/1
TABLET, EXTENDED RELEASE ORAL
Qty: 180 TABLET | Refills: 0 | Status: SHIPPED | OUTPATIENT
Start: 2022-12-12

## 2022-12-17 DIAGNOSIS — I10 ESSENTIAL HYPERTENSION: ICD-10-CM

## 2022-12-17 DIAGNOSIS — E78.2 MIXED HYPERLIPIDEMIA: ICD-10-CM

## 2022-12-17 RX ORDER — EZETIMIBE 10 MG/1
TABLET ORAL
Qty: 90 TABLET | Refills: 1 | Status: SHIPPED | OUTPATIENT
Start: 2022-12-17

## 2022-12-17 RX ORDER — METOPROLOL SUCCINATE 50 MG/1
TABLET, EXTENDED RELEASE ORAL
Qty: 90 TABLET | Refills: 1 | Status: SHIPPED | OUTPATIENT
Start: 2022-12-17

## 2023-01-05 LAB — HBA1C MFR BLD: 6.3 % OF TOTAL HGB

## 2023-01-06 ENCOUNTER — RA CDI HCC (OUTPATIENT)
Dept: OTHER | Facility: HOSPITAL | Age: 68
End: 2023-01-06

## 2023-01-06 NOTE — PROGRESS NOTES
[facility]  coding opportunities       Chart reviewed, no opportunity found:   [address]        Patients Insurance     Medicare Insurance: [employer] Advantage

## 2023-01-12 ENCOUNTER — OFFICE VISIT (OUTPATIENT)
Dept: FAMILY MEDICINE CLINIC | Facility: CLINIC | Age: 68
End: 2023-01-12

## 2023-01-12 VITALS
HEART RATE: 76 BPM | BODY MASS INDEX: 41.96 KG/M2 | RESPIRATION RATE: 16 BRPM | DIASTOLIC BLOOD PRESSURE: 78 MMHG | HEIGHT: 62 IN | SYSTOLIC BLOOD PRESSURE: 116 MMHG | WEIGHT: 228 LBS | OXYGEN SATURATION: 96 %

## 2023-01-12 DIAGNOSIS — E11.9 TYPE 2 DIABETES MELLITUS WITHOUT COMPLICATION, WITHOUT LONG-TERM CURRENT USE OF INSULIN (HCC): Primary | ICD-10-CM

## 2023-01-12 DIAGNOSIS — E78.2 MIXED HYPERLIPIDEMIA: ICD-10-CM

## 2023-01-12 DIAGNOSIS — E66.01 MORBID OBESITY WITH BMI OF 40.0-44.9, ADULT (HCC): ICD-10-CM

## 2023-01-12 DIAGNOSIS — E55.9 VITAMIN D DEFICIENCY: ICD-10-CM

## 2023-01-12 DIAGNOSIS — K63.5 BENIGN COLON POLYP: ICD-10-CM

## 2023-01-12 DIAGNOSIS — I10 ESSENTIAL HYPERTENSION: ICD-10-CM

## 2023-01-12 DIAGNOSIS — G47.00 INSOMNIA, UNSPECIFIED TYPE: ICD-10-CM

## 2023-01-12 DIAGNOSIS — F41.9 ANXIETY: ICD-10-CM

## 2023-01-12 DIAGNOSIS — D75.1 ERYTHROCYTOSIS: ICD-10-CM

## 2023-01-12 DIAGNOSIS — Z12.31 SCREENING MAMMOGRAM, ENCOUNTER FOR: ICD-10-CM

## 2023-01-12 DIAGNOSIS — F33.42 RECURRENT MAJOR DEPRESSIVE DISORDER, IN FULL REMISSION (HCC): ICD-10-CM

## 2023-01-12 RX ORDER — CITALOPRAM 20 MG/1
20 TABLET ORAL DAILY
Qty: 90 TABLET | Refills: 3 | Status: SHIPPED | OUTPATIENT
Start: 2023-01-12

## 2023-01-12 RX ORDER — METFORMIN HYDROCHLORIDE 500 MG/1
500 TABLET, EXTENDED RELEASE ORAL 2 TIMES DAILY WITH MEALS
Qty: 180 TABLET | Refills: 1 | Status: SHIPPED | OUTPATIENT
Start: 2023-01-12

## 2023-01-12 RX ORDER — EZETIMIBE 10 MG/1
10 TABLET ORAL
Qty: 90 TABLET | Refills: 3 | Status: SHIPPED | OUTPATIENT
Start: 2023-01-12

## 2023-01-12 RX ORDER — ATORVASTATIN CALCIUM 80 MG/1
80 TABLET, FILM COATED ORAL DAILY
Qty: 90 TABLET | Refills: 3 | Status: SHIPPED | OUTPATIENT
Start: 2023-01-12

## 2023-01-12 RX ORDER — METOPROLOL SUCCINATE 50 MG/1
50 TABLET, EXTENDED RELEASE ORAL DAILY
Qty: 90 TABLET | Refills: 3 | Status: SHIPPED | OUTPATIENT
Start: 2023-01-12

## 2023-01-12 NOTE — PATIENT INSTRUCTIONS
Continue the same medications    See you back in 4 months with fasting blood work and urine one or 2 weeks prior  We will do your Pap and full physical that day as well  We will do EKG    In the meantime consider scheduling your AAA ultrasound of your abdomen as well as a sleep study

## 2023-01-12 NOTE — PROGRESS NOTES
ASSESSMENT/PLAN:    Type 2 diabetes  A1c is stable at 6 3  Continue metformin and recheck in 4 months    Hyperlipidemia  Patient is on atorvastatin and acetamide also on diet  Recheck before next visit    High risk for sleep apnea  Patient will eventually set up an appointment with sleep medicine     Erythrocytosis  Ferritin is normal and low normal at 24 next likely most likely due to smoking  Check CBC     Depression  Has been doing very well with citalopram continue the same     Colon polyps  Next colonoscopy October 2025     Hypertension   Blood pressure is very good with metoprolol      Vitamin-D deficiency   Continue vitamin-D 5000 units daily units  Check vitamin D level        Recheck in 4 months  A1c microalbumin screening labs  Pap test that day  EKG that     Return sooner if needed    BMI Counseling: Body mass index is 41 7 kg/m²  The BMI is above normal  Nutrition recommendations include decreasing portion sizes  Exercise recommendations include moderate physical activity 150 minutes/week  Rationale for BMI follow-up plan is due to patient being overweight or obese              Health Maintenance   Topic Date Due   • Hepatitis A Vaccine (1 of 2 - Risk 2-dose series) Never done   • Hepatitis B Vaccine (1 of 3 - Risk 3-dose series) Never done   • Kidney Health Evaluation: Microalbumin/Creatinine Ratio  12/31/2020   • Cervical Cancer Screening  10/11/2021   • COVID-19 Vaccine (5 - Booster for Pfizer series) 12/12/2022   • Kidney Health Evaluation: GFR  03/04/2023   • BMI: Followup Plan  03/10/2023   • Hepatitis C Screening  04/10/2024 (Originally 1955)   • Breast Cancer Screening: Mammogram  04/15/2023   • HEMOGLOBIN A1C  07/04/2023   • Depression Remission PHQ  07/12/2023   • Fall Risk  07/28/2023   • Urinary Incontinence Screening  07/28/2023   • Medicare Annual Wellness Visit (AWV)  07/28/2023   • BMI: Adult  07/28/2023   • Diabetic Foot Exam  07/28/2023   • DXA SCAN  04/21/2024   • DM Eye Exam 05/16/2024   • Colorectal Cancer Screening  12/10/2025   • Osteoporosis Screening  Completed   • Pneumococcal Vaccine: 65+ Years  Completed   • Influenza Vaccine  Completed   • HIB Vaccine  Aged Out   • IPV Vaccine  Aged Out   • Meningococcal ACWY Vaccine  Aged Out   • HPV Vaccine  Aged Out         Problem List as of 1/12/2023 Reviewed: 7/28/2022  9:29 AM by Delia Olsen, DO    Benign colon polyp    Erythrocytosis    Essential hypertension    Insomnia    Mixed hyperlipidemia    Morbid obesity with BMI of 40 0-44 9, adult ([facility] )    Psoriasis    Recurrent major depressive disorder, in partial remission ([facility] )    Type 2 diabetes mellitus without complication, without long-term current use of insulin ([facility] )    Uncomplicated alcohol abuse    Vitamin D deficiency         Subjective:   Chief Complaint   Patient presents with   • Diabetes   • Hypertension   • Hyperlipidemia     Patient is here for diabetes recheck  Unfortunately her  got COVID over [place] so she was unable to see everybody she would like to see    Nonetheless her A1c is still also    No complaints      patient ID: Jeanna Bernstein is a 78 y o  female  Patient's past medical history, surgical history, family history, social history, and Tobacco history reviewed with patient  MED LIST WAS REVIEWED AND UPDATED    ROS  As per HPI  Rest of 12 point review of systems negative     Objective:      VITALS:  Wt Readings from Last 3 Encounters:   01/12/23 103 kg (228 lb)   07/28/22 105 kg (231 lb 4 8 oz)   04/15/22 109 kg (240 lb)     BP Readings from Last 3 Encounters:   01/12/23 116/78   07/28/22 126/80   03/10/22 130/80     Pulse Readings from Last 3 Encounters:   01/12/23 76   07/28/22 75   03/10/22 74     Body mass index is 41 7 kg/m²  Laboratory Results:    All pertinent labs and studies were reviewed with patient during this office visit with highlights of the results contained in this note in the ASSESSMENT AND PLAN section       Physical Exam    Constitutional  Appears healthy, Looks well, Appearance consistent with age    Mental Status  Alert, Oriented, Cooperative, Memory function normal , clean, and reasonable    Neck  No neck mass, No thyromegaly, Good carotid upstrokes bilaterally, trachea midline positive click    Respiratory  Breath sounds normal, No rales, No rhonchi, No wheezing, normal palpation    Cardiac  Regular rhythm without ectopy or murmur no S3-S4, no heave lift or thrill to palpation    Vascular  No leg edema, No pedal edema    Muscular skeletal  No clubbing cyanosis , muscle tone normal    Skin  No appreciable rashes or abnormal appearing lesions

## 2023-06-08 LAB
25(OH)D3 SERPL-MCNC: 45 NG/ML (ref 30–100)
ALBUMIN SERPL-MCNC: 4.2 G/DL (ref 3.6–5.1)
ALBUMIN/GLOB SERPL: 1.6 (CALC) (ref 1–2.5)
ALP SERPL-CCNC: 78 U/L (ref 37–153)
ALT SERPL-CCNC: 16 U/L (ref 6–29)
APPEARANCE UR: CLEAR
AST SERPL-CCNC: 15 U/L (ref 10–35)
BASOPHILS # BLD AUTO: 30 CELLS/UL (ref 0–200)
BASOPHILS NFR BLD AUTO: 0.3 %
BILIRUB SERPL-MCNC: 0.4 MG/DL (ref 0.2–1.2)
BILIRUB UR QL STRIP: NEGATIVE
BUN SERPL-MCNC: 11 MG/DL (ref 7–25)
BUN/CREAT SERPL: ABNORMAL (CALC) (ref 6–22)
CALCIUM SERPL-MCNC: 9.1 MG/DL (ref 8.6–10.4)
CHLORIDE SERPL-SCNC: 102 MMOL/L (ref 98–110)
CHOLEST SERPL-MCNC: 170 MG/DL
CHOLEST/HDLC SERPL: 3.5 (CALC)
CO2 SERPL-SCNC: 32 MMOL/L (ref 20–32)
COLOR UR: YELLOW
CREAT SERPL-MCNC: 0.79 MG/DL (ref 0.5–1.05)
EOSINOPHIL # BLD AUTO: 110 CELLS/UL (ref 15–500)
EOSINOPHIL NFR BLD AUTO: 1.1 %
ERYTHROCYTE [DISTWIDTH] IN BLOOD BY AUTOMATED COUNT: 13 % (ref 11–15)
EST. AVERAGE GLUCOSE BLD GHB EST-MCNC: 143 MG/DL (CALC)
GFR/BSA.PRED SERPLBLD CYS-BASED-ARV: 82 ML/MIN/1.73M2
GLOBULIN SER CALC-MCNC: 2.6 G/DL (CALC) (ref 1.9–3.7)
GLUCOSE SERPL-MCNC: 129 MG/DL (ref 65–99)
GLUCOSE UR QL STRIP: NEGATIVE
HBA1C MFR BLD: 6.2 % OF TOTAL HGB
HCT VFR BLD AUTO: 45.9 % (ref 35–45)
HDLC SERPL-MCNC: 49 MG/DL
HGB BLD-MCNC: 15.3 G/DL (ref 11.7–15.5)
HGB UR QL STRIP: NEGATIVE
KETONES UR QL STRIP: NEGATIVE
LDLC SERPL CALC-MCNC: 94 MG/DL (CALC)
LEUKOCYTE ESTERASE UR QL STRIP: NEGATIVE
LYMPHOCYTES # BLD AUTO: 2270 CELLS/UL (ref 850–3900)
LYMPHOCYTES NFR BLD AUTO: 22.7 %
MCH RBC QN AUTO: 32.6 PG (ref 27–33)
MCHC RBC AUTO-ENTMCNC: 33.3 G/DL (ref 32–36)
MCV RBC AUTO: 97.9 FL (ref 80–100)
MICROALBUMIN UR-MCNC: 0.9 MG/DL
MONOCYTES # BLD AUTO: 600 CELLS/UL (ref 200–950)
MONOCYTES NFR BLD AUTO: 6 %
NEUTROPHILS # BLD AUTO: 6990 CELLS/UL (ref 1500–7800)
NEUTROPHILS NFR BLD AUTO: 69.9 %
NITRITE UR QL STRIP: NEGATIVE
NONHDLC SERPL-MCNC: 121 MG/DL (CALC)
PH UR STRIP: 5.5 [PH] (ref 5–8)
PLATELET # BLD AUTO: 234 THOUSAND/UL (ref 140–400)
PMV BLD REES-ECKER: 12.8 FL (ref 7.5–12.5)
POTASSIUM SERPL-SCNC: 4.6 MMOL/L (ref 3.5–5.3)
PROT SERPL-MCNC: 6.8 G/DL (ref 6.1–8.1)
PROT UR QL STRIP: NEGATIVE
RBC # BLD AUTO: 4.69 MILLION/UL (ref 3.8–5.1)
SODIUM SERPL-SCNC: 142 MMOL/L (ref 135–146)
SP GR UR STRIP: 1.01 (ref 1–1.03)
TRIGL SERPL-MCNC: 175 MG/DL
TSH SERPL-ACNC: 1.91 MIU/L (ref 0.4–4.5)
WBC # BLD AUTO: 10 THOUSAND/UL (ref 3.8–10.8)

## 2023-06-13 ENCOUNTER — OFFICE VISIT (OUTPATIENT)
Dept: FAMILY MEDICINE CLINIC | Facility: CLINIC | Age: 68
End: 2023-06-13
Payer: COMMERCIAL

## 2023-06-13 VITALS
HEART RATE: 71 BPM | WEIGHT: 228.1 LBS | BODY MASS INDEX: 41.97 KG/M2 | RESPIRATION RATE: 16 BRPM | DIASTOLIC BLOOD PRESSURE: 78 MMHG | HEIGHT: 62 IN | TEMPERATURE: 97.6 F | SYSTOLIC BLOOD PRESSURE: 126 MMHG | OXYGEN SATURATION: 93 %

## 2023-06-13 DIAGNOSIS — D75.1 ERYTHROCYTOSIS: ICD-10-CM

## 2023-06-13 DIAGNOSIS — K63.5 BENIGN COLON POLYP: ICD-10-CM

## 2023-06-13 DIAGNOSIS — E11.9 TYPE 2 DIABETES MELLITUS WITHOUT COMPLICATION, WITHOUT LONG-TERM CURRENT USE OF INSULIN (HCC): Primary | ICD-10-CM

## 2023-06-13 DIAGNOSIS — E55.9 VITAMIN D DEFICIENCY: ICD-10-CM

## 2023-06-13 DIAGNOSIS — K62.89 ANAL OR RECTAL PAIN: ICD-10-CM

## 2023-06-13 DIAGNOSIS — E78.2 MIXED HYPERLIPIDEMIA: ICD-10-CM

## 2023-06-13 DIAGNOSIS — I10 ESSENTIAL HYPERTENSION: ICD-10-CM

## 2023-06-13 PROCEDURE — 99214 OFFICE O/P EST MOD 30 MIN: CPT | Performed by: FAMILY MEDICINE

## 2023-06-13 PROCEDURE — 93000 ELECTROCARDIOGRAM COMPLETE: CPT | Performed by: FAMILY MEDICINE

## 2023-06-13 NOTE — PROGRESS NOTES
ASSESSMENT/PLAN:    Type 2 diabetes  1C stable at 6 2 from 6 3  Continue metformin  A1c in 4 months    Hyperlipidemia  Atorvastatin and ezetimibe  Cholesterol excellent 170 LDL 94  Continue the same    Erythrocytosis  Hemoglobin finally normal at 15 3  Patient is trying to decrease her tobacco use  Work-up in the past with ferritin was normal    Depression  Continue citalopram doing well    High risk for sleep apnea  Patient still not interested in set up an appointment with sleep medicine      Colon polyps  Next colonoscopy October 2025     Hypertension   Blood pressure is very good with metoprolol      Vitamin-D deficiency   Continue vitamin-D 5000 units daily units  Vitamin D level is normal        Recheck in 4 months  A1c renal blood work  We will try to do Pap test again that day       Return sooner if needed          Health Maintenance   Topic Date Due   • Hepatitis A Vaccine (1 of 2 - Risk 2-dose series) Never done   • Hepatitis B Vaccine (1 of 3 - Risk 3-dose series) Never done   • Kidney Health Evaluation: Albumin/Creatinine Ratio  12/31/2020   • Cervical Cancer Screening  10/11/2021   • COVID-19 Vaccine (5 - Pfizer series) 12/12/2022   • Breast Cancer Screening: Mammogram  04/15/2023   • Fall Risk  07/28/2023   • Medicare Annual Wellness Visit (AWV)  07/28/2023   • Diabetic Foot Exam  07/28/2023   • Hepatitis C Screening  04/10/2024 (Originally 1955)   • Urinary Incontinence Screening  07/28/2023   • HEMOGLOBIN A1C  12/07/2023   • Depression Remission PHQ  12/13/2023   • BMI: Followup Plan  01/12/2024   • BMI: Adult  01/12/2024   • DXA SCAN  04/21/2024   • DM Eye Exam  05/16/2024   • Kidney Health Evaluation: GFR  06/07/2024   • Colorectal Cancer Screening  12/10/2025   • Osteoporosis Screening  Completed   • Pneumococcal Vaccine: 65+ Years  Completed   • Influenza Vaccine  Completed   • HIB Vaccine  Aged Out   • IPV Vaccine  Aged Out   • Meningococcal ACWY Vaccine  Aged Out   • HPV Vaccine  Aged Out Problem List as of 6/13/2023 Reviewed: 1/12/2023  9:31 AM by Mario Beckford,     Benign colon polyp    Erythrocytosis    Essential hypertension    Insomnia    Mixed hyperlipidemia    Morbid obesity with BMI of 40 0-44 9, adult (HCC)    Psoriasis    Recurrent major depressive disorder, in full remission ([facility] )    Type 2 diabetes mellitus without complication, without long-term current use of insulin (HCC)    Uncomplicated alcohol abuse    Vitamin D deficiency         Subjective:   Chief Complaint   Patient presents with   • Follow-up     Patient is here for diabetes recheck    She has been consistent with her medicine  She will order mammogram and AAA screen    Her and her  have changed the foods they eat and have stopped buying things that are tempting but not good for their health    She is trying to cut back on her smoking  Study with alcohol without the results      patient ID: Roxana Camarena is a 78 y o  female  Patient's past medical history, surgical history, family history, social history, and Tobacco history reviewed with patient  MED LIST WAS REVIEWED AND UPDATED    ROS  As per HPI  Rest of 12 point review of systems negative     Objective:      VITALS:  Wt Readings from Last 3 Encounters:   06/13/23 103 kg (228 lb 1 6 oz)   01/12/23 103 kg (228 lb)   07/28/22 105 kg (231 lb 4 8 oz)     BP Readings from Last 3 Encounters:   06/13/23 126/78   01/12/23 116/78   07/28/22 126/80     Pulse Readings from Last 3 Encounters:   06/13/23 71   01/12/23 76   07/28/22 75     Body mass index is 42 06 kg/m²  Laboratory Results:    All pertinent labs and studies were reviewed with patient during this office visit with highlights of the results contained in this note in the ASSESSMENT AND PLAN section       Physical Exam    Constitutional  Appears healthy, Looks well, Appearance consistent with age    Mental Status  Alert, Oriented, Cooperative, Memory function normal , clean, and reasonable    Neck  No neck mass, No thyromegaly, Good carotid upstrokes bilaterally, trachea midline positive click    Respiratory  Breath sounds normal, No rales, No rhonchi, No wheezing, normal palpation    Cardiac  Regular rhythm without ectopy or murmur no S3-S4, no heave lift or thrill to palpation    Vascular  No leg edema, No pedal edema    Muscular skeletal  No clubbing cyanosis , muscle tone normal    Skin  No appreciable rashes or abnormal appearing lesions

## 2023-06-13 NOTE — PATIENT INSTRUCTIONS
Keep trying to cut down on cigarettes   I am glad that you are eating better food and hopefully you will want to get out and move a little bit more for exercise    Please call and schedule your mammogram and your ultrasound of your aorta and see if she could go around the same place around the same time to make it easier for you    See you back in 4 months for Pap test and a recheck  Nonfasting blood work one or 2 weeks prior  What to Do if Your Blood Sugar is Low   AMBULATORY CARE:   Low blood sugar levels  (hypoglycemia) can happen with Type 1 and Type 2 diabetes  Low levels are more likely to happen if you use insulin  Hypoglycemia can cause you to have falls, accidents, and injuries  A blood sugar level that gets too low can lead to seizures, coma, and death  Learn to recognize the symptoms early so you can get treatment quickly  When your blood sugar is low you may feel:  • Sweaty    • Nervous or shaky    • Anxious or irritable    • Confused    • A fast, pounding heartbeat    • Extremely hungry    Have someone call your local emergency number (911 in the [address] [facility]) if:   • You cannot be woken  • You have a seizure  Call your doctor if:   • You have symptoms of a low blood sugar level, such as trouble thinking, sweating, or a pounding heartbeat  • Your blood sugar level is lower than normal and it does not improve with treatment  • You often have lower blood sugar levels than your target goals  • You have trouble coping with your illness, or you feel anxious or depressed  • You have questions or concerns about your condition or care  What to do if you have symptoms of low blood sugar:   • Check your blood sugar level, if possible  Your blood sugar level is too low if it is at or below 70 mg/dL  • Eat or drink 15 grams of fast-acting carbohydrate  Fast-acting carbohydrates will raise your blood sugar level quickly   Examples of 15 grams of fast-acting carbohydrates:     ? 4 ounces (½ cup) of fruit juice     ? 4 ounces of regular soda    ? 2 tablespoons of raisins     ? 1 tube of glucose gel or 3 to 4 glucose tablets       • Check your blood sugar level 15 minutes later  If the level is still low (less than 100 mg/dL), eat another 15 grams of carbohydrate  When the level returns to 100 mg/dL, eat a snack or meal that contains carbohydrates  This will help prevent another drop in blood sugar  • Teach people close to you how to use your glucagon kit  Your blood sugar may be too low for you to be awake  People need to know when and how to use your kit  Prevent low blood sugar levels:  Prevent low blood sugar by knowing what increases your risk  Ask your healthcare provider for ways to prevent low blood sugar levels  Any of the following can increase your risk of low blood sugar:  • Fasting for tests or procedures    • During or after intense exercise    • Late or postponed meals    • Sleeping (you may need a bedtime snack)     • Drinking alcohol if you use insulin or insulin releasing pills    Follow up with your doctor as directed:  Write down your questions so you remember to ask them during your visits  © Copyright [facility] 2022 Information is for End User's use only and may not be sold, redistributed or otherwise used for commercial purposes  The above information is an  only  It is not intended as medical advice for individual conditions or treatments  Talk to your doctor, nurse or pharmacist before following any medical regimen to see if it is safe and effective for you

## 2023-08-26 DIAGNOSIS — E11.9 TYPE 2 DIABETES MELLITUS WITHOUT COMPLICATION, WITHOUT LONG-TERM CURRENT USE OF INSULIN (HCC): ICD-10-CM

## 2023-08-26 RX ORDER — METFORMIN HYDROCHLORIDE 500 MG/1
500 TABLET, EXTENDED RELEASE ORAL 2 TIMES DAILY WITH MEALS
Qty: 180 TABLET | Refills: 1 | Status: SHIPPED | OUTPATIENT
Start: 2023-08-26

## 2023-09-14 ENCOUNTER — HOSPITAL ENCOUNTER (OUTPATIENT)
Dept: MAMMOGRAPHY | Facility: IMAGING CENTER | Age: 68
Discharge: HOME | End: 2023-09-14
Payer: COMMERCIAL

## 2023-09-14 VITALS — BODY MASS INDEX: 41.79 KG/M2 | WEIGHT: 227.07 LBS | HEIGHT: 62 IN

## 2023-09-14 DIAGNOSIS — Z12.31 SCREENING MAMMOGRAM, ENCOUNTER FOR: ICD-10-CM

## 2023-09-14 PROCEDURE — 77063 BREAST TOMOSYNTHESIS BI: CPT

## 2023-09-14 PROCEDURE — 77067 SCR MAMMO BI INCL CAD: CPT

## 2023-09-21 ENCOUNTER — HOSPITAL ENCOUNTER (OUTPATIENT)
Dept: MAMMOGRAPHY | Facility: IMAGING CENTER | Age: 68
Discharge: HOME | End: 2023-09-21

## 2023-09-21 DIAGNOSIS — Z12.31 SCREENING MAMMOGRAM, ENCOUNTER FOR: ICD-10-CM

## 2023-10-17 ENCOUNTER — RA CDI HCC (OUTPATIENT)
Dept: OTHER | Facility: HOSPITAL | Age: 68
End: 2023-10-17

## 2023-10-17 NOTE — PROGRESS NOTES
[address] [facility] coding opportunities       Chart reviewed, no opportunity found: [address]        Patients Insurance     Medicare Insurance: [employer]

## 2023-10-18 LAB
ALBUMIN SERPL-MCNC: 4.4 G/DL (ref 3.6–5.1)
BUN SERPL-MCNC: 11 MG/DL (ref 7–25)
BUN/CREAT SERPL: ABNORMAL (CALC) (ref 6–22)
CALCIUM SERPL-MCNC: 9.1 MG/DL (ref 8.6–10.4)
CHLORIDE SERPL-SCNC: 102 MMOL/L (ref 98–110)
CO2 SERPL-SCNC: 29 MMOL/L (ref 20–32)
CREAT SERPL-MCNC: 0.78 MG/DL (ref 0.5–1.05)
EST. AVERAGE GLUCOSE BLD GHB EST-MCNC: 147 MG/DL (CALC)
GFR/BSA.PRED SERPLBLD CYS-BASED-ARV: 84 ML/MIN/1.73M2
GLUCOSE SERPL-MCNC: 126 MG/DL (ref 65–99)
HBA1C MFR BLD: 6.3 % OF TOTAL HGB
PHOSPHATE SERPL-MCNC: 4 MG/DL (ref 2.1–4.3)
POTASSIUM SERPL-SCNC: 4.6 MMOL/L (ref 3.5–5.3)
SODIUM SERPL-SCNC: 140 MMOL/L (ref 135–146)

## 2023-10-26 ENCOUNTER — OFFICE VISIT (OUTPATIENT)
Dept: FAMILY MEDICINE CLINIC | Facility: CLINIC | Age: 68
End: 2023-10-26
Payer: COMMERCIAL

## 2023-10-26 VITALS
OXYGEN SATURATION: 92 % | WEIGHT: 225.5 LBS | SYSTOLIC BLOOD PRESSURE: 120 MMHG | BODY MASS INDEX: 41.49 KG/M2 | DIASTOLIC BLOOD PRESSURE: 80 MMHG | HEIGHT: 62 IN | RESPIRATION RATE: 16 BRPM | HEART RATE: 78 BPM

## 2023-10-26 DIAGNOSIS — Z13.6 ENCOUNTER FOR ABDOMINAL AORTIC ANEURYSM SCREENING: ICD-10-CM

## 2023-10-26 DIAGNOSIS — F33.42 RECURRENT MAJOR DEPRESSIVE DISORDER, IN FULL REMISSION (HCC): ICD-10-CM

## 2023-10-26 DIAGNOSIS — E55.9 VITAMIN D DEFICIENCY: ICD-10-CM

## 2023-10-26 DIAGNOSIS — E11.9 TYPE 2 DIABETES MELLITUS WITHOUT COMPLICATION, WITHOUT LONG-TERM CURRENT USE OF INSULIN (HCC): ICD-10-CM

## 2023-10-26 DIAGNOSIS — G47.00 INSOMNIA, UNSPECIFIED TYPE: ICD-10-CM

## 2023-10-26 DIAGNOSIS — Z00.00 MEDICARE ANNUAL WELLNESS VISIT, SUBSEQUENT: Primary | ICD-10-CM

## 2023-10-26 DIAGNOSIS — E78.2 MIXED HYPERLIPIDEMIA: ICD-10-CM

## 2023-10-26 DIAGNOSIS — I10 ESSENTIAL HYPERTENSION: ICD-10-CM

## 2023-10-26 DIAGNOSIS — F41.9 ANXIETY: ICD-10-CM

## 2023-10-26 PROCEDURE — 99214 OFFICE O/P EST MOD 30 MIN: CPT | Performed by: FAMILY MEDICINE

## 2023-10-26 PROCEDURE — G0439 PPPS, SUBSEQ VISIT: HCPCS | Performed by: FAMILY MEDICINE

## 2023-10-26 RX ORDER — METOPROLOL SUCCINATE 50 MG/1
50 TABLET, EXTENDED RELEASE ORAL DAILY
Qty: 90 TABLET | Refills: 3 | Status: SHIPPED | OUTPATIENT
Start: 2023-10-26

## 2023-10-26 RX ORDER — METFORMIN HYDROCHLORIDE 500 MG/1
500 TABLET, EXTENDED RELEASE ORAL 2 TIMES DAILY WITH MEALS
Qty: 180 TABLET | Refills: 1 | Status: SHIPPED | OUTPATIENT
Start: 2023-10-26

## 2023-10-26 RX ORDER — EZETIMIBE 10 MG/1
10 TABLET ORAL
Qty: 90 TABLET | Refills: 3 | Status: SHIPPED | OUTPATIENT
Start: 2023-10-26

## 2023-10-26 RX ORDER — CITALOPRAM 20 MG/1
20 TABLET ORAL DAILY
Qty: 90 TABLET | Refills: 3 | Status: SHIPPED | OUTPATIENT
Start: 2023-10-26

## 2023-10-26 RX ORDER — ATORVASTATIN CALCIUM 80 MG/1
80 TABLET, FILM COATED ORAL DAILY
Qty: 90 TABLET | Refills: 3 | Status: SHIPPED | OUTPATIENT
Start: 2023-10-26

## 2023-10-26 NOTE — PATIENT INSTRUCTIONS
Please schedule AAA scan  See you back in 4 months with nonfasting blood work and a urine   see you sooner if needed          Medicare Preventive Visit Patient Instructions  Thank you for completing your Welcome to Medicare Visit or Medicare Annual Wellness Visit today. Your next wellness visit will be due in one year (10/26/2024). The screening/preventive services that you may require over the next 5-10 years are detailed below. Some tests may not apply to you based off risk factors and/or age. Screening tests ordered at today's visit but not completed yet may show as past due. Also, please note that scanned in results may not display below. Preventive Screenings:  Service Recommendations Previous Testing/Comments   Colorectal Cancer Screening  * Colonoscopy    * Fecal Occult Blood Test (FOBT)/Fecal Immunochemical Test (FIT)  * Fecal DNA/Cologuard Test  * Flexible Sigmoidoscopy Age: 43-73 years old   Colonoscopy: every 10 years (may be performed more frequently if at higher risk)  OR  FOBT/FIT: every 1 year  OR  Cologuard: every 3 years  OR  Sigmoidoscopy: every 5 years  Screening may be recommended earlier than age 39 if at higher risk for colorectal cancer. Also, an individualized decision between you and your healthcare provider will decide whether screening between the ages of 77-80 would be appropriate. Colonoscopy: 12/10/2020  FOBT/FIT: Not on file  Cologuard: Not on file  Sigmoidoscopy: Not on file    Screening Current     Breast Cancer Screening Age: 36 years old  Frequency: every 1-2 years  Not required if history of left and right mastectomy Mammogram: 09/14/2023    Screening Current   Cervical Cancer Screening Between the ages of 21-29, pap smear recommended once every 3 years. Between the ages of 32-69, can perform pap smear with HPV co-testing every 5 years.    Recommendations may differ for women with a history of total hysterectomy, cervical cancer, or abnormal pap smears in past. Pap Smear: 10/11/2018    Screening Not Indicated   Hepatitis C Screening Once for adults born between 1945 and 1965  More frequently in patients at high risk for Hepatitis C Hep C Antibody: Not on file    Screening Current   Diabetes Screening 1-2 times per year if you're at risk for diabetes or have pre-diabetes Fasting glucose: No results in last 5 years (No results in last 5 years)  A1C: 6.3 % of total Hgb (10/17/2023)  Screening Not Indicated  History Diabetes   Cholesterol Screening Once every 5 years if you don't have a lipid disorder. May order more often based on risk factors. Lipid panel: 06/07/2023    Screening Not Indicated  History Lipid Disorder     Other Preventive Screenings Covered by Medicare:  Abdominal Aortic Aneurysm (AAA) Screening: covered once if your at risk. You're considered to be at risk if you have a family history of AAA. Lung Cancer Screening: covers low dose CT scan once per year if you meet all of the following conditions: (1) Age 48-67; (2) No signs or symptoms of lung cancer; (3) Current smoker or have quit smoking within the last 15 years; (4) You have a tobacco smoking history of at least 20 pack years (packs per day multiplied by number of years you smoked); (5) You get a written order from a healthcare provider. Glaucoma Screening: covered annually if you're considered high risk: (1) You have diabetes OR (2) Family history of glaucoma OR (3)  aged 48 and older OR (3)  American aged 72 and older  Osteoporosis Screening: covered every 2 years if you meet one of the following conditions: (1) You're estrogen deficient and at risk for osteoporosis based off medical history and other findings; (2) Have a vertebral abnormality; (3) On glucocorticoid therapy for more than 3 months; (4) Have primary hyperparathyroidism; (5) On osteoporosis medications and need to assess response to drug therapy. Last bone density test (DXA Scan): 04/21/2021.   HIV Screening: covered annually if you're between the age of 14-79. Also covered annually if you are younger than 13 and older than 72 with risk factors for HIV infection. For pregnant patients, it is covered up to 3 times per pregnancy. Immunizations:  Immunization Recommendations   Influenza Vaccine Annual influenza vaccination during flu season is recommended for all persons aged >= 6 months who do not have contraindications   Pneumococcal Vaccine   * Pneumococcal conjugate vaccine = PCV13 (Prevnar 13), PCV15 (Vaxneuvance), PCV20 (Prevnar 20)  * Pneumococcal polysaccharide vaccine = PPSV23 (Pneumovax) Adults 77-19 yo with certain risk factors or if 69+ yo  If never received any pneumonia vaccine: recommend Prevnar 20 (PCV20)  Give PCV20 if previously received 1 dose of PCV13 or PPSV23   Hepatitis B Vaccine 3 dose series if at intermediate or high risk (ex: diabetes, end stage renal disease, liver disease)   Respiratory syncytial virus (RSV) Vaccine - COVERED BY MEDICARE PART D  * RSVPreF3 (Arexvy) CDC recommends that adults 61years of age and older may receive a single dose of RSV vaccine using shared clinical decision-making (SCDM)   Tetanus (Td) Vaccine - COST NOT COVERED BY MEDICARE PART B Following completion of primary series, a booster dose should be given every 10 years to maintain immunity against tetanus. Td may also be given as tetanus wound prophylaxis. Tdap Vaccine - COST NOT COVERED BY MEDICARE PART B Recommended at least once for all adults. For pregnant patients, recommended with each pregnancy.    Shingles Vaccine (Shingrix) - COST NOT COVERED BY MEDICARE PART B  2 shot series recommended in those 19 years and older who have or will have weakened immune systems or those 50 years and older     Health Maintenance Due:      Topic Date Due    Cervical Cancer Screening  10/11/2021    Hepatitis C Screening  04/10/2024 (Originally 1955)    DXA SCAN  04/21/2024    Breast Cancer Screening: Mammogram  09/14/2024 Colorectal Cancer Screening  12/10/2025     Immunizations Due:      Topic Date Due    Hepatitis B Vaccine (1 of 3 - Risk 3-dose series) Never done    COVID-19 Vaccine (5 - Pfizer series) 12/12/2022    Influenza Vaccine (1) 09/01/2023     Advance Directives   What are advance directives? Advance directives are legal documents that state your wishes and plans for medical care. These plans are made ahead of time in case you lose your ability to make decisions for yourself. Advance directives can apply to any medical decision, such as the treatments you want, and if you want to donate organs. What are the types of advance directives? There are many types of advance directives, and each state has rules about how to use them. You may choose a combination of any of the following:  Living will: This is a written record of the treatment you want. You can also choose which treatments you do not want, which to limit, and which to stop at a certain time. This includes surgery, medicine, IV fluid, and tube feedings. Durable power of  for [facility]): This is a written record that states who you want to make healthcare choices for you when you are unable to make them for yourself. This person, called a proxy, is usually a family member or a friend. You may choose more than 1 proxy. Do not resuscitate (DNR) order:  A DNR order is used in case your heart stops beating or you stop breathing. It is a request not to have certain forms of treatment, such as CPR. A DNR order may be included in other types of advance directives. Medical directive: This covers the care that you want if you are in a coma, near death, or unable to make decisions for yourself. You can list the treatments you want for each condition. Treatment may include pain medicine, surgery, blood transfusions, dialysis, IV or tube feedings, and a ventilator (breathing machine). Values history:   This document has questions about your views, beliefs, and how you feel and think about life. This information can help others choose the care that you would choose. Why are advance directives important? An advance directive helps you control your care. Although spoken wishes may be used, it is better to have your wishes written down. Spoken wishes can be misunderstood, or not followed. Treatments may be given even if you do not want them. An advance directive may make it easier for your family to make difficult choices about your care. Urinary Incontinence   Urinary incontinence (UI)  is when you lose control of your bladder. UI develops because your bladder cannot store or empty urine properly. The 3 most common types of UI are stress incontinence, urge incontinence, or both. Medicines:   May be given to help strengthen your bladder control. Report any side effects of medication to your healthcare provider. Do pelvic muscle exercises often:  Your pelvic muscles help you stop urinating. Squeeze these muscles tight for 5 seconds, then relax for 5 seconds. Gradually work up to squeezing for 10 seconds. Do 3 sets of 15 repetitions a day, or as directed. This will help strengthen your pelvic muscles and improve bladder control. Train your bladder:  Go to the bathroom at set times, such as every 2 hours, even if you do not feel the urge to go. You can also try to hold your urine when you feel the urge to go. For example, hold your urine for 5 minutes when you feel the urge to go. As that becomes easier, hold your urine for 10 minutes. Self-care:   Keep a UI record. Write down how often you leak urine and how much you leak. Make a note of what you were doing when you leaked urine. Drink liquids as directed. You may need to limit the amount of liquid you drink to help control your urine leakage. Do not drink any liquid right before you go to bed. Limit or do not have drinks that contain caffeine or alcohol. Prevent constipation.   Eat a variety of high-fiber foods. Good examples are high-fiber cereals, beans, vegetables, and whole-grain breads. Walking is the best way to trigger your intestines to have a bowel movement. Exercise regularly and maintain a healthy weight. Weight loss and exercise will decrease pressure on your bladder and help you control your leakage. Use a catheter as directed  to help empty your bladder. A catheter is a tiny, plastic tube that is put into your bladder to drain your urine. Go to behavior therapy as directed. Behavior therapy may be used to help you learn to control your urge to urinate. Cigarette Smoking and Your Health   Risks to your health if you smoke:  Nicotine and other chemicals found in tobacco damage every cell in your body. Even if you are a light smoker, you have an increased risk for cancer, heart disease, and lung disease. If you are pregnant or have diabetes, smoking increases your risk for complications. Benefits to your health if you stop smoking: You decrease respiratory symptoms such as coughing, wheezing, and shortness of breath. You reduce your risk for cancers of the lung, mouth, throat, kidney, bladder, pancreas, stomach, and cervix. If you already have cancer, you increase the benefits of chemotherapy. You also reduce your risk for cancer returning or a second cancer from developing. You reduce your risk for heart disease, blood clots, heart attack, and stroke. You reduce your risk for lung infections, and diseases such as pneumonia, asthma, chronic bronchitis, and emphysema. Your circulation improves. More oxygen can be delivered to your body. If you have diabetes, you lower your risk for complications, such as kidney, artery, and eye diseases. You also lower your risk for nerve damage. Nerve damage can lead to amputations, poor vision, and blindness. You improve your body's ability to heal and to fight infections.   For more information and support to stop smoking: [employer]. [employer]  Phone: 7- 349 - [phone]  Web Address: www.[employer]  Weight Management   Why it is important to manage your weight:  Being overweight increases your risk of health conditions such as heart disease, high blood pressure, type 2 diabetes, and certain types of cancer. It can also increase your risk for osteoarthritis, sleep apnea, and other respiratory problems. Aim for a slow, steady weight loss. Even a small amount of weight loss can lower your risk of health problems. How to lose weight safely:  A safe and healthy way to lose weight is to eat fewer calories and get regular exercise. You can lose up about 1 pound a week by decreasing the number of calories you eat by 500 calories each day. Healthy meal plan for weight management:  A healthy meal plan includes a variety of foods, contains fewer calories, and helps you stay healthy. A healthy meal plan includes the following:  Eat whole-grain foods more often. A healthy meal plan should contain fiber. Fiber is the part of grains, fruits, and vegetables that is not broken down by your body. Whole-grain foods are healthy and provide extra fiber in your diet. Some examples of whole-grain foods are whole-wheat breads and pastas, oatmeal, brown rice, and bulgur. Eat a variety of vegetables every day. Include dark, leafy greens such as spinach, kale, julian greens, and mustard greens. Eat yellow and orange vegetables such as carrots, sweet potatoes, and winter squash. Eat a variety of fruits every day. Choose fresh or canned fruit (canned in its own juice or light syrup) instead of juice. Fruit juice has very little or no fiber. Eat low-fat dairy foods. Drink fat-free (skim) milk or 1% milk. Eat fat-free yogurt and low-fat cottage cheese. Try low-fat cheeses such as mozzarella and other reduced-fat cheeses. Choose meat and other protein foods that are low in fat. Choose beans or other legumes such as split peas or lentils.  Choose fish, skinless poultry (chicken or turkey), or lean cuts of red meat (beef or pork). Before you cook meat or poultry, cut off any visible fat. Use less fat and oil. Try baking foods instead of frying them. Add less fat, such as margarine, sour cream, regular salad dressing and mayonnaise to foods. Eat fewer high-fat foods. Some examples of high-fat foods include french fries, doughnuts, ice cream, and cakes. Eat fewer sweets. Limit foods and drinks that are high in sugar. This includes candy, cookies, regular soda, and sweetened drinks. Exercise:  Exercise at least 30 minutes per day on most days of the week. Some examples of exercise include walking, biking, dancing, and swimming. You can also fit in more physical activity by taking the stairs instead of the elevator or parking farther away from stores. Ask your healthcare provider about the best exercise plan for you. © Copyright [employer] 2018 Information is for End User's use only and may not be sold, redistributed or otherwise used for commercial purposes.  All illustrations and images included in CareNotes® are the copyrighted property of [employer] or  [address]

## 2023-10-26 NOTE — PROGRESS NOTES
Diabetic Foot Exam    Patient's shoes and socks removed. Right Foot/Ankle   Right Foot Inspection  Skin Exam: skin normal and skin intact. No dry skin, no warmth, no callus, no erythema, no maceration, no abnormal color, no pre-ulcer, no ulcer and no callus. Toe Exam: ROM and strength within normal limits. Sensory   Vibration: intact  Proprioception: intact  Monofilament testing: intact    Vascular  The right DP pulse is 2+. The right PT pulse is 2+. Left Foot/Ankle  Left Foot Inspection  Skin Exam: skin normal and skin intact. No dry skin, no warmth, no erythema, no maceration, normal color, no pre-ulcer, no ulcer and no callus. Toe Exam: ROM and strength within normal limits. Sensory   Vibration: intact  Proprioception: intact  Monofilament testing: intact    Vascular  The left DP pulse is 2+. The left PT pulse is 2+.      Assign Risk Category  No deformity present  No loss of protective sensation  No weak pulses  Risk: 0   Assessment and Plan:   Living will done today  AAA reordered  Problem List Items Addressed This Visit       Essential hypertension    Relevant Medications    metoprolol succinate (TOPROL-XL) 50 mg 24 hr tablet    Insomnia    Mixed hyperlipidemia    Relevant Medications    atorvastatin (LIPITOR) 80 mg tablet    ezetimibe (ZETIA) 10 mg tablet    Recurrent major depressive disorder, in full remission (HCC)    Relevant Medications    citalopram (CeleXA) 20 mg tablet    Type 2 diabetes mellitus without complication, without long-term current use of insulin (HCC)    Relevant Medications    metFORMIN (GLUCOPHAGE-XR) 500 mg 24 hr tablet    Other Relevant Orders    Hemoglobin A1C    Albumin / creatinine urine ratio    Vitamin D deficiency     Other Visit Diagnoses       Medicare annual wellness visit, subsequent    -  Primary    Encounter for abdominal aortic aneurysm screening        Relevant Orders    US abdominal aorta screening aaa    Anxiety        Relevant Medications citalopram (CeleXA) 20 mg tablet             Preventive health issues were discussed with patient, and age appropriate screening tests were ordered as noted in patient's After Visit Summary. Personalized health advice and appropriate referrals for health education or preventive services given if needed, as noted in patient's After Visit Summary.      History of Present Illness:     Patient presents for a Medicare Wellness Visit    Diabetes       Patient Care Team:  Rob Quijano DO as PCP - General  Rob Quijano DO as PCP - Sharon MADELYN Faisal Dana (RTE)  Rob Quijano DO as PCP - PCP-Noam (RTE)  Rob Quijano DO     Review of Systems:     Review of Systems     Problem List:     Patient Active Problem List   Diagnosis    Benign colon polyp    Recurrent major depressive disorder, in full remission ([address])    Type 2 diabetes mellitus without complication, without long-term current use of insulin ([address])    Essential hypertension    Mixed hyperlipidemia    Insomnia    Psoriasis    Uncomplicated alcohol abuse    Vitamin D deficiency    Morbid obesity with BMI of 40.0-44.9, adult [facility])    Erythrocytosis      Past Medical and Surgical History:     Past Medical History:   Diagnosis Date    Hypertension     Insomnia     Last assessed: 4/13/17     Past Surgical History:   Procedure Laterality Date    BREAST BIOPSY Left 05/26/2016    benign    BREAST EXCISIONAL BIOPSY Right 1974    benign    US GUIDED BREAST BIOPSY LEFT COMPLETE Left 05/26/2016      Family History:     Family History   Problem Relation Age of Onset    Emphysema Mother     Alzheimer's disease Father     Hypertension Father     Cancer Sister         unknown primary-late 50's-stomach    No Known Problems Maternal Grandmother     No Known Problems Maternal Grandfather     No Known Problems Paternal Grandmother     No Known Problems Paternal Grandfather     Alcohol abuse Brother     Colon cancer Brother 61    No Known Problems Maternal Aunt     No Known Problems Paternal Aunt     No Known Problems Paternal Aunt     No Known Problems Paternal Aunt     No Known Problems Paternal Aunt     No Known Problems Paternal Aunt     Substance Abuse Neg Hx       Social History:     Social History     Socioeconomic History    Marital status: /Civil Union     Spouse name: None    Number of children: None    Years of education: None    Highest education level: None   Occupational History    None   Tobacco Use    Smoking status: Every Day     Packs/day: 1.50     Types: Cigarettes    Smokeless tobacco: Never   Vaping Use    Vaping Use: Never used   Substance and Sexual Activity    Alcohol use: Yes     Comment: Social Per [employer]: 5-6 mixzed drinks every other weekend    Drug use: No    Sexual activity: Not Currently   Other Topics Concern    None   Social History Narrative    Coffee consumption: 1 cup daily    Uses safety equipment: seatbelts     Social Determinants of Health     Financial Resource Strain: Low Risk  (10/26/2023)    Overall Financial Resource Strain (CARDIA)     Difficulty of Paying Living Expenses: Not hard at all   Food Insecurity: Not on file   Transportation Needs: No Transportation Needs (10/26/2023)    PRAPARE - Transportation     Lack of Transportation (Medical): No     Lack of Transportation (Non-Medical):  No   Physical Activity: Not on file   Stress: Not on file   Social Connections: Not on file   Intimate Partner Violence: Not on file   Housing Stability: Not on file      Medications and Allergies:     Current Outpatient Medications   Medication Sig Dispense Refill    atorvastatin (LIPITOR) 80 mg tablet Take 1 tablet (80 mg total) by mouth daily 90 tablet 3    citalopram (CeleXA) 20 mg tablet Take 1 tablet (20 mg total) by mouth daily 90 tablet 3    ezetimibe (ZETIA) 10 mg tablet Take 1 tablet (10 mg total) by mouth daily at bedtime 90 tablet 3    hydrocortisone 2.5 % cream Apply topically 2 (two) times a day as needed for irritation      Hypromellose (ARTIFICIAL TEARS OP) Apply to eye as needed        metFORMIN (GLUCOPHAGE-XR) 500 mg 24 hr tablet Take 1 tablet (500 mg total) by mouth 2 (two) times a day with meals 180 tablet 1    metoprolol succinate (TOPROL-XL) 50 mg 24 hr tablet Take 1 tablet (50 mg total) by mouth daily 90 tablet 3    VITAMIN D, ERGOCALCIFEROL, PO Take by mouth. No current facility-administered medications for this visit. No Known Allergies   Immunizations:     Immunization History   Administered Date(s) Administered    COVID-19 PFIZER VACCINE 0.3 ML IM 02/27/2021, 03/20/2021, 12/03/2021, 10/17/2022    DTaP 5 05/03/2010    INFLUENZA 10/10/2016, 11/20/2017, 12/03/2021, 10/17/2022    Influenza Quadrivalent, 6-35 Months IM 10/10/2016, 11/20/2017    Influenza, injectable, quadrivalent, preservative free 0.5 mL 10/23/2020    Influenza, recombinant, quadrivalent,injectable, preservative free 08/23/2018, 10/17/2019    Pneumococcal Conjugate 13-Valent 10/10/2016    Pneumococcal Polysaccharide PPV23 04/29/2014, 03/10/2022    Zoster 07/18/2017    Zoster Vaccine Recombinant 06/21/2021, 08/23/2021      Health Maintenance:         Topic Date Due    Cervical Cancer Screening  10/11/2021    Hepatitis C Screening  04/10/2024 (Originally 1955)    DXA SCAN  04/21/2024    Breast Cancer Screening: Mammogram  09/14/2024    Colorectal Cancer Screening  12/10/2025         Topic Date Due    Hepatitis B Vaccine (1 of 3 - Risk 3-dose series) Never done    COVID-19 Vaccine (5 - Pfizer series) 12/12/2022    Influenza Vaccine (1) 09/01/2023      Medicare Screening Tests and Risk Assessments:     Vince Prater is here for her Subsequent Wellness visit. Health Risk Assessment:   Patient rates overall health as fair. Patient feels that their physical health rating is same. Patient is satisfied with their life. Eyesight was rated as same. Hearing was rated as same. Patient feels that their emotional and mental health rating is slightly worse.  Patients states they are never, rarely angry. Patient states they are sometimes unusually tired/fatigued. Pain experienced in the last 7 days has been none. Patient states that she has experienced no weight loss or gain in last 6 months. Depression Screening:   PHQ-9 Score: 5      Fall Risk Screening: In the past year, patient has experienced: no history of falling in past year      Urinary Incontinence Screening:   Patient has leaked urine accidently in the last six months. Home Safety:  Patient does not have trouble with stairs inside or outside of their home. Patient has working smoke alarms and has no working carbon monoxide detector. Home safety hazards include: none. Nutrition:   Current diet is Regular and Limited junk food. Medications:   Patient is currently taking over-the-counter supplements. OTC medications include: see medication list. Patient is able to manage medications. Activities of Daily Living (ADLs)/Instrumental Activities of Daily Living (IADLs):   Walk and transfer into and out of bed and chair?: Yes  Dress and groom yourself?: Yes    Bathe or shower yourself?: Yes    Feed yourself?  Yes  Do your laundry/housekeeping?: Yes  Manage your money, pay your bills and track your expenses?: Yes  Make your own meals?: Yes    Do your own shopping?: Yes    Previous Hospitalizations:   Any hospitalizations or ED visits within the last 12 months?: No      Advance Care Planning:   Living will: Yes    Advanced directive: Yes    End of Life Decisions reviewed with patient: Yes      Comments: Filled today with pt    Cognitive Screening:   Provider or family/friend/caregiver concerned regarding cognition?: No    PREVENTIVE SCREENINGS      Cardiovascular Screening:    General: Screening Not Indicated and History Lipid Disorder      Diabetes Screening:     General: Screening Not Indicated and History Diabetes      Colorectal Cancer Screening:     General: Screening Current      Breast Cancer Screening: General: Screening Current      Cervical Cancer Screening:    General: Screening Not Indicated      Osteoporosis Screening:    General: Screening Current      Abdominal Aortic Aneurysm (AAA) Screening:      Due for: Screening AAA Ultrasound      Lung Cancer Screening:     General: Screening Not Indicated      Hepatitis C Screening:    General: Screening Current    Screening, Brief Intervention, and Referral to Treatment (SBIRT)    Screening  Typical number of drinks in a day: 0  Typical number of drinks in a week: 5  Interpretation: Low risk drinking behavior. Single Item Drug Screening:  How often have you used an illegal drug (including marijuana) or a prescription medication for non-medical reasons in the past year? never    Single Item Drug Screen Score: 0  Interpretation: Negative screen for possible drug use disorder    Brief Intervention  Alcohol & drug use screenings were reviewed. No concerns regarding substance use disorder identified. Other Counseling Topics:   Regular weightbearing exercise and calcium and vitamin D intake. No results found. Physical Exam:     /80   Pulse 78   Resp 16   Ht 5' 1.75" (1.568 m)   Wt 102 kg (225 lb 8 oz)   SpO2 92%   BMI 41.58 kg/m²     Physical Exam  Cardiovascular:      Pulses: no weak pulses          Dorsalis pedis pulses are 2+ on the right side and 2+ on the left side. Posterior tibial pulses are 2+ on the right side and 2+ on the left side. Feet:      Right foot:      Skin integrity: No ulcer, skin breakdown, erythema, warmth, callus or dry skin. Left foot:      Skin integrity: No ulcer, skin breakdown, erythema, warmth, callus or dry skin.           Pankaj Shelley DO

## 2023-10-26 NOTE — PROGRESS NOTES
ASSESSMENT/PLAN:    Type 2 diabetes  A1c stable 6.3 from 6.2  Continue metformin recheck A1c microalbumin  In 4 months     Hyperlipidemia  Atorvastatin and ezetimibe  Continue the same     Erythrocytosis  Continues to cut down smoking with improvement in hemoglobin  Work-up in the past with ferritin was normal     Depression  Continue citalopram doing well     High risk for sleep apnea  Patient still not interested in set up an appointment with sleep medicine      Colon polyps  Next colonoscopy October 2025     Hypertension   Blood pressure is very good with metoprolol  Continue this      Vitamin-D deficiency   Continue vitamin-D 5000 units daily units  Vitamin D level is normal        Recheck in 4 months  A1c renal blood work ,ma        Return sooner if needed    Urinary Incontinence Plan of Care: counseling topics discussed: practice Kegel (pelvic floor strengthening) exercises. Tobacco Cessation Counseling: Tobacco cessation counseling was provided.  The patient is sincerely urged to quit consumption of tobacco. She is not ready to quit tobacco.            Health Maintenance   Topic Date Due    Hepatitis B Vaccine (1 of 3 - Risk 3-dose series) Never done    Kidney Health Evaluation: Albumin/Creatinine Ratio  12/31/2020    Cervical Cancer Screening  10/11/2021    COVID-19 Vaccine (5 - Pfizer series) 12/12/2022    Medicare Annual Wellness Visit (AWV)  07/28/2023    Influenza Vaccine (1) 09/01/2023    BMI: Followup Plan  01/12/2024    Hepatitis C Screening  04/10/2024 (Originally 1955)    Hepatitis A Vaccine (1 of 2 - Risk 2-dose series) 06/13/2024 (Originally 12/3/1974)    HEMOGLOBIN A1C  04/17/2024    DXA SCAN  04/21/2024    Depression Remission PHQ  04/26/2024    DM Eye Exam  05/16/2024    BMI: Adult  09/14/2024    Breast Cancer Screening: Mammogram  09/14/2024    Kidney Health Evaluation: GFR  10/17/2024    Fall Risk  10/26/2024    Urinary Incontinence Screening  10/26/2024    Diabetic Foot Exam 10/26/2024    Colorectal Cancer Screening  12/10/2025    Osteoporosis Screening  Completed    Pneumococcal Vaccine: 65+ Years  Completed    HIB Vaccine  Aged Out    IPV Vaccine  Aged Out    Meningococcal ACWY Vaccine  Aged Out    HPV Vaccine  Aged Out         Problem List as of 10/26/2023 Reviewed: 1/12/2023  9:31 AM by Shalini Moreno,       Benign colon polyp    Erythrocytosis    Essential hypertension    Insomnia    Mixed hyperlipidemia    Morbid obesity with BMI of 40.0-44.9, adult ([address])    Psoriasis    Recurrent major depressive disorder, in full remission ([address])    Type 2 diabetes mellitus without complication, without long-term current use of insulin ([address])    Uncomplicated alcohol abuse    Vitamin D deficiency         Subjective:   Chief Complaint   Patient presents with    Medicare Wellness Visit    Diabetes     Patient is here for her diabetes recheck and blood pressure check  She had her labs done as well as her mammogram  She went to get her AAA ordered and they told her she needed a new one because it was more than a year despite the expiration being 2027        patient ID: López Soliz is a 78 y.o. female. Patient's past medical history, surgical history, family history, social history, and Tobacco history reviewed with patient. MED LIST WAS REVIEWED AND UPDATED    ROS  As per HPI  Rest of 12 point review of systems negative     Objective:      VITALS:  Wt Readings from Last 3 Encounters:   10/26/23 102 kg (225 lb 8 oz)   09/14/23 103 kg (227 lb 1.2 oz)   06/13/23 103 kg (228 lb 1.6 oz)     BP Readings from Last 3 Encounters:   10/26/23 120/80   06/13/23 126/78   01/12/23 116/78     Pulse Readings from Last 3 Encounters:   10/26/23 78   06/13/23 71   01/12/23 76     Body mass index is 41.58 kg/m². Laboratory Results:    All pertinent labs and studies were reviewed with patient during this office visit with highlights of the results contained in this note in the [address] [place] [address] section       Physical Exam  Constitutional  Appears healthy, Looks well, Appearance consistent with age    Mental Status  Alert, Oriented, Cooperative, Memory function normal , clean, and reasonable    Neck  No neck mass, No thyromegaly, Good carotid upstrokes bilaterally, trachea midline positive click    Respiratory  Breath sounds normal, No rales, No rhonchi, No wheezing, normal palpation    Cardiac  Regular rhythm without ectopy or murmur no S3-S4, no heave lift or thrill to palpation    Vascular  No leg edema, No pedal edema    Muscular skeletal  No clubbing cyanosis , muscle tone normal    Skin  No appreciable rashes or abnormal appearing lesions

## 2023-11-09 ENCOUNTER — OFFICE VISIT (OUTPATIENT)
Dept: FAMILY MEDICINE CLINIC | Facility: CLINIC | Age: 68
End: 2023-11-09
Payer: COMMERCIAL

## 2023-11-09 VITALS
BODY MASS INDEX: 41.51 KG/M2 | OXYGEN SATURATION: 98 % | HEIGHT: 62 IN | RESPIRATION RATE: 15 BRPM | DIASTOLIC BLOOD PRESSURE: 78 MMHG | SYSTOLIC BLOOD PRESSURE: 120 MMHG | WEIGHT: 225.6 LBS | HEART RATE: 80 BPM

## 2023-11-09 DIAGNOSIS — M67.40 GANGLION CYST OF FLEXOR TENDON SHEATH: Primary | ICD-10-CM

## 2023-11-09 PROCEDURE — 99213 OFFICE O/P EST LOW 20 MIN: CPT | Performed by: FAMILY MEDICINE

## 2023-11-09 NOTE — PATIENT INSTRUCTIONS
Please make an appointment with Dr. Lo Mclaughlin soon as you get the information because it might take a couple weeks to get in

## 2023-11-09 NOTE — PROGRESS NOTES
Assessment/Plan:    Ganglion cyst  To see Dr. Renetta Rogers for treatment  Pathophysiology discussed with patient           Subjective:   Tanvi Bender is a 78 y. o.female  Chief Complaint   Patient presents with    Thumb Pain     After leaving last visit patient started to have problems with her right thumb clicking and locking and then clicking open again  Also a lot of pain at the MCP joint and she felt a nodule here very tender on the right side especially because of typing it makes it hard        Past medical history, social history, and family history reviewed as appropriate for the complaint of this patient. MEDICATIONS REVIEWED AND UPDATED    10 point review of systems performed, the remainder of the ROS is negative except for what is noted in the history of chief complaint    Objective:    Vitals:    11/09/23 1500   BP: 120/78   Pulse: 80   Resp: 15   SpO2: 98%     Body mass index is 41.94 kg/m². Physical Exam    General  Patient in no acute distress, well appearing, well nourished and appears stated age    Mental status  Good judgment and insight, oriented to time person and place, recent and remote memory is intact, mood and affect are normal, cooperative, and patient is reasonable.     Ganglion cyst  Right hand audible click on visual click from flexion to extension  Palpable nodule at the MCP joint on the flexor side of her tendon

## 2023-11-20 ENCOUNTER — HOSPITAL ENCOUNTER (OUTPATIENT)
Dept: ULTRASOUND IMAGING | Facility: HOSPITAL | Age: 68
Discharge: HOME | End: 2023-11-20
Payer: COMMERCIAL

## 2023-11-20 DIAGNOSIS — Z13.6 ENCOUNTER FOR ABDOMINAL AORTIC ANEURYSM SCREENING: ICD-10-CM

## 2023-11-20 PROCEDURE — 76706 US ABDL AORTA SCREEN AAA: CPT

## 2023-11-27 ENCOUNTER — TELEPHONE (OUTPATIENT)
Dept: FAMILY MEDICINE CLINIC | Facility: CLINIC | Age: 68
End: 2023-11-27

## 2023-11-27 NOTE — TELEPHONE ENCOUNTER
----- Message from Jovany Katz DO sent at 11/26/2023  5:49 PM EST -----  Call patient to inform her that there is no evidence of a AAA    \  Patient was informed.

## 2023-11-29 LAB
LEFT EYE DIABETIC RETINOPATHY: NORMAL
RIGHT EYE DIABETIC RETINOPATHY: NORMAL

## 2023-12-30 ENCOUNTER — VBI (OUTPATIENT)
Dept: ADMINISTRATIVE | Facility: OTHER | Age: 68
End: 2023-12-30

## 2023-12-30 NOTE — TELEPHONE ENCOUNTER
Upon review of the In Basket request we were able to locate, review, and update the patient chart as requested for Diabetic Eye Exam.    Any additional questions or concerns should be emailed to the Practice Liaisons via the appropriate education email address, please do not reply via In Basket.    Thank you  Vincent Kang MA

## 2024-01-22 ENCOUNTER — PREP FOR PROCEDURE (OUTPATIENT)
Dept: OBGYN CLINIC | Facility: CLINIC | Age: 69
End: 2024-01-22

## 2024-01-22 ENCOUNTER — OFFICE VISIT (OUTPATIENT)
Dept: OBGYN CLINIC | Facility: CLINIC | Age: 69
End: 2024-01-22
Payer: COMMERCIAL

## 2024-01-22 VITALS
WEIGHT: 225 LBS | SYSTOLIC BLOOD PRESSURE: 123 MMHG | HEIGHT: 62 IN | DIASTOLIC BLOOD PRESSURE: 90 MMHG | BODY MASS INDEX: 41.41 KG/M2

## 2024-01-22 DIAGNOSIS — E11.9 TYPE 2 DIABETES MELLITUS WITHOUT COMPLICATION, WITHOUT LONG-TERM CURRENT USE OF INSULIN (HCC): ICD-10-CM

## 2024-01-22 DIAGNOSIS — M67.40 GANGLION CYST OF FLEXOR TENDON SHEATH: Primary | ICD-10-CM

## 2024-01-22 DIAGNOSIS — M65.311 TRIGGER THUMB OF RIGHT HAND: ICD-10-CM

## 2024-01-22 PROCEDURE — 99204 OFFICE O/P NEW MOD 45 MIN: CPT | Performed by: ORTHOPAEDIC SURGERY

## 2024-01-22 RX ORDER — LIDOCAINE HYDROCHLORIDE AND EPINEPHRINE 10; 10 MG/ML; UG/ML
20 INJECTION, SOLUTION INFILTRATION; PERINEURAL ONCE
Status: CANCELLED | OUTPATIENT
Start: 2024-01-25 | End: 2024-01-22

## 2024-01-22 NOTE — H&P (VIEW-ONLY)
ASSESSMENT/PLAN:    Assessment:   Trigger Finger  bilateral  thumb, right worse then left     Plan:   Trigger finger CSI's vs release was discussed at length including risks and benefits.   We discussed surgery has an approx. 98 percent succuss rate and may be performed under local.   Risks of surgery are listed below.   Noelle elected to proceed with a right trigger thumb release under local and informed surgical consent was signed.   We will observe the left side today at this point in time.    Follow Up:  After Surgery    To Do Next Visit:  Sutures out      Operative Discussions:     Trigger Finger Release: The anatomy and physiology of trigger finger was discussed with the patient today in the office.  Edema and increased contact pressure within the flexor tendons at the A1 pulley can cause pain, crepitation, and limitation of function.  Treatment options include resting MP blocking splints to decrease edema, oral anti-inflammatory medications, home or formal therapy exercises, up to 2 steroid injections or surgical release.  While majority of patients do respond to conservative treatment, up to 20% may require surgical release.  The patient has elected release of the trigger finger.  The patient has elected to undergo a release of the A1 pulley (trigger finger).  A small incision will be made over the palmar aspect of the hand, the tendon sheath holding the flexor tendons will be released.  In the postoperative period, light activities are allowed immediately, driving is allowed when narcotic medication has stopped, and the incision may get wet after 2 days.  Heavy activities (lifting more than approximately 10 pounds) will be allowed after the follow up appointment in 1-2 weeks.  While the pain and discomfort within the wrist typically improves rapidly, some residual discomfort may be present for up to 6 weeks.  The nodule that is typically palpable in the palmar aspect of the hand will not be removed, as this  would necessitate removal of a portion of the flexor tendon, however the catching, clicking, and locking should resolve.  Approximate success rate is 98%.The risks and benefits of the procedure were explained to the patient, which include, but are not limited to: Bleeding, infection, recurrence, pain, scar, damage to tendons, damage to nerves, and damage to blood vessels, need for future surgery and complications related to anesthesia.  If bony work is done, risks also include malunion and nonunion.  These risks, along with alternative conservative treatment options, and postoperative protocols were voiced back and understood by the patient.  All questions were answered to the patient's satisfaction.  The patient agrees to comply with a standard postoperative protocol, and is willing to proceed.  Education was provided via written and auditory forms.  There were no barriers to learning. Written handouts regarding wound care, incision and scar care, and general preoperative information, as well as risks and benefits were provided to the patient.      _____________________________________________________  CHIEF COMPLAINT:  Chief Complaint   Patient presents with    Right Hand - Clicking, Locking     Right thumb locking/clicking     Left Hand - Clicking, Locking     Left thumb clicking/locking          SUBJECTIVE:  Shyla Fink is a 68 y.o. female who presents with bilateral thumb clicking, catching and locking. I am seeing Noelle in consultation at the request of Dr. Janina Willams. She states her thumbs have been clicking, catching and locking for approx. 5 months. Her right thumb is worse then her left and she now notes difficulty with thumb flexion. She notes a nodule to both thumbs. She is not having to take anything for pain control currently   Associated symptoms: Catching, Locking, and Stiffness/LROM  Handedness: right      PAST MEDICAL HISTORY:  Past Medical History:   Diagnosis Date    Hypertension      Insomnia     Last assessed: 4/13/17       PAST SURGICAL HISTORY:  Past Surgical History:   Procedure Laterality Date    BREAST BIOPSY Left 05/26/2016    benign    BREAST EXCISIONAL BIOPSY Right 1974    benign    US GUIDED BREAST BIOPSY LEFT COMPLETE Left 05/26/2016       FAMILY HISTORY:  Family History   Problem Relation Age of Onset    Emphysema Mother     Alzheimer's disease Father     Hypertension Father     Cancer Sister         unknown primary-late 50's-stomach    No Known Problems Maternal Grandmother     No Known Problems Maternal Grandfather     No Known Problems Paternal Grandmother     No Known Problems Paternal Grandfather     Alcohol abuse Brother     Colon cancer Brother 63    No Known Problems Maternal Aunt     No Known Problems Paternal Aunt     No Known Problems Paternal Aunt     No Known Problems Paternal Aunt     No Known Problems Paternal Aunt     No Known Problems Paternal Aunt     Substance Abuse Neg Hx        SOCIAL HISTORY:  Social History     Tobacco Use    Smoking status: Every Day     Current packs/day: 1.50     Types: Cigarettes    Smokeless tobacco: Never   Vaping Use    Vaping status: Never Used   Substance Use Topics    Alcohol use: Yes     Comment: Social Per [employer]: 5-6 mixzed drinks every other weekend    Drug use: No       MEDICATIONS:    Current Outpatient Medications:     atorvastatin (LIPITOR) 80 mg tablet, Take 1 tablet (80 mg total) by mouth daily, Disp: 90 tablet, Rfl: 3    citalopram (CeleXA) 20 mg tablet, Take 1 tablet (20 mg total) by mouth daily, Disp: 90 tablet, Rfl: 3    ezetimibe (ZETIA) 10 mg tablet, Take 1 tablet (10 mg total) by mouth daily at bedtime, Disp: 90 tablet, Rfl: 3    hydrocortisone 2.5 % cream, Apply topically 2 (two) times a day as needed for irritation, Disp: , Rfl:     Hypromellose (ARTIFICIAL TEARS OP), Apply to eye as needed  , Disp: , Rfl:     metFORMIN (GLUCOPHAGE-XR) 500 mg 24 hr tablet, Take 1 tablet (500 mg total) by mouth 2 (two) times a day  "with meals, Disp: 180 tablet, Rfl: 1    metoprolol succinate (TOPROL-XL) 50 mg 24 hr tablet, Take 1 tablet (50 mg total) by mouth daily, Disp: 90 tablet, Rfl: 3    VITAMIN D, ERGOCALCIFEROL, PO, Take by mouth., Disp: , Rfl:     ALLERGIES:  No Known Allergies    REVIEW OF SYSTEMS:  Pertinent items are noted in HPI.  A comprehensive review of systems was negative.    LABS:  HgA1c:   Lab Results   Component Value Date    HGBA1C 6.3 (H) 10/17/2023     BMP:   Lab Results   Component Value Date    GLUCOSE 102 01/03/2015    CALCIUM 9.1 10/17/2023     01/09/2017    K 4.6 10/17/2023    CO2 29 10/17/2023     10/17/2023    BUN 11 10/17/2023    CREATININE 0.78 10/17/2023         _____________________________________________________  PHYSICAL EXAMINATION:  Vital signs: /90   Ht 5' 1.5\" (1.562 m)   Wt 102 kg (225 lb)   BMI 41.82 kg/m²   General: well developed and well nourished, alert, oriented times 3, and appears comfortable  Psychiatric: Normal  HEENT: Trachea Midline, No torticollis  Cardiovascular: No discernable arrhythmia  Pulmonary: No wheezing or stridor  Abdomen: No rebound or guarding  Extremities: No peripheral edema  Skin: No masses, erythema, lacerations, fluctation, ulcerations  Neurovascular: Sensation Intact to the Median, Ulnar, Radial Nerve, Motor Intact to the Median, Ulnar, Radial Nerve, and Pulses Intact    MUSCULOSKELETAL EXAMINATION:  LEFT SIDE:  Finger:  Triggering  thumb, Palpable clicking thumb, and Nodules  thumb  RIGHT SIDE:  Finger:  Triggering  thumb, Palpable clicking thumb, and Nodules  thumb    Remainder of the hand without triggering.  Full range of motion, no crepitation to the remaining.  Negative Finkelstein test.  No other triggers.  _____________________________________________________  STUDIES REVIEWED:  No Studies to review      PROCEDURES PERFORMED:  Procedures  No Procedures performed today    Scribe Attestation      I,:  Harriet Hernandez am acting as a scribe " while in the presence of the attending physician.:       I,:  Kwame Cunha MD personally performed the services described in this documentation    as scribed in my presence.:

## 2024-01-25 ENCOUNTER — HOSPITAL ENCOUNTER (OUTPATIENT)
Facility: HOSPITAL | Age: 69
Setting detail: OUTPATIENT SURGERY
Discharge: HOME | End: 2024-01-25
Attending: ORTHOPAEDIC SURGERY | Admitting: ORTHOPAEDIC SURGERY
Payer: COMMERCIAL

## 2024-01-25 VITALS
TEMPERATURE: 98 F | RESPIRATION RATE: 12 BRPM | HEART RATE: 67 BPM | DIASTOLIC BLOOD PRESSURE: 70 MMHG | OXYGEN SATURATION: 96 % | SYSTOLIC BLOOD PRESSURE: 126 MMHG

## 2024-01-25 DIAGNOSIS — M65.311 TRIGGER THUMB OF RIGHT HAND: Primary | ICD-10-CM

## 2024-01-25 LAB — GLUCOSE SERPL-MCNC: 116 MG/DL (ref 65–140)

## 2024-01-25 PROCEDURE — 26055 INCISE FINGER TENDON SHEATH: CPT | Performed by: PHYSICIAN ASSISTANT

## 2024-01-25 PROCEDURE — 82948 REAGENT STRIP/BLOOD GLUCOSE: CPT

## 2024-01-25 PROCEDURE — 26055 INCISE FINGER TENDON SHEATH: CPT | Performed by: ORTHOPAEDIC SURGERY

## 2024-01-25 RX ORDER — ONDANSETRON 2 MG/ML
4 INJECTION INTRAMUSCULAR; INTRAVENOUS EVERY 6 HOURS PRN
Status: DISCONTINUED | OUTPATIENT
Start: 2024-01-25 | End: 2024-01-25 | Stop reason: HOSPADM

## 2024-01-25 RX ORDER — COVID-19 ANTIGEN TEST
220 KIT MISCELLANEOUS 2 TIMES DAILY
Qty: 60 CAPSULE | Refills: 0 | Status: SHIPPED | OUTPATIENT
Start: 2024-01-25 | End: 2024-02-24

## 2024-01-25 RX ORDER — LIDOCAINE HYDROCHLORIDE AND EPINEPHRINE 10; 10 MG/ML; UG/ML
20 INJECTION, SOLUTION INFILTRATION; PERINEURAL ONCE
Status: COMPLETED | OUTPATIENT
Start: 2024-01-25 | End: 2024-01-25

## 2024-01-25 RX ORDER — TRAMADOL HYDROCHLORIDE 50 MG/1
50 TABLET ORAL EVERY 6 HOURS PRN
Qty: 5 TABLET | Refills: 0 | Status: SHIPPED | OUTPATIENT
Start: 2024-01-25

## 2024-01-25 RX ORDER — SENNOSIDES 8.6 MG
650 CAPSULE ORAL EVERY 8 HOURS PRN
Qty: 30 TABLET | Refills: 0 | Status: SHIPPED | OUTPATIENT
Start: 2024-01-25

## 2024-01-25 RX ORDER — TRAMADOL HYDROCHLORIDE 50 MG/1
50 TABLET ORAL EVERY 6 HOURS PRN
Status: DISCONTINUED | OUTPATIENT
Start: 2024-01-25 | End: 2024-01-25 | Stop reason: HOSPADM

## 2024-01-25 RX ORDER — ACETAMINOPHEN 325 MG/1
650 TABLET ORAL EVERY 6 HOURS PRN
Status: DISCONTINUED | OUTPATIENT
Start: 2024-01-25 | End: 2024-01-25 | Stop reason: HOSPADM

## 2024-01-25 RX ADMIN — LIDOCAINE HYDROCHLORIDE,EPINEPHRINE BITARTRATE 4 ML: 10; .01 INJECTION, SOLUTION INFILTRATION; PERINEURAL at 07:13

## 2024-01-25 NOTE — INTERVAL H&P NOTE
H&P reviewed. After examining the patient I find no changes in the patients condition since the H&P had been written.    Vitals:    01/25/24 0712   BP: 127/70   Pulse: 72   Resp: 18   Temp: 98.8 °F (37.1 °C)   SpO2: 92%

## 2024-01-25 NOTE — OP NOTE
OPERATIVE REPORT  PATIENT NAME: Shyla Fink  :  1955  MRN: [id number]  Pt Location:  MAIN OR    SURGERY DATE: 24    Surgeons and Role:     * Kwame Cunha MD - Primary     * Perry Troncoso PA-C - Assisting    Pre-Op Diagnosis:  Trigger thumb of right hand [M65.311]    Post-Op Diagnosis:  .Trigger thumb of right hand [M65.311]    Procedure(s) (LRB):  Right trigger thumb release (Right)    Specimen(s):  No specimens collected during this procedure.    Estimated Blood Loss:   Minimal      Anesthesia Type:   Local    Operative Indications:  The patient has a history of Trigger Finger  right  thumb that was recalcitrant to conservative management.  The decision was made to bring the patient to the operating room for Trigger Finger Release  right  thumb.  Risks of the procedure were explained which include, but are not limited to bleeding; infection; damage to nerves, arteries,veins, tendons; scar; pain; need for reoperation; failure to give desired result; and risks of anaesthesia.  All questions were answered to satisfaction and they were willing to proceed.         Operative Findings:  Right trigger thumb    Complications:   None    Procedure and Technique:  After the patient, site, and procedure were identified, the patient was brought into the operating room in a supine position.  Local anaesthesia was adminstered in the preoperative holding area.  A tourniquet was not used.  The  right upper extremity was then prepped and drapped in a normal, sterile, orthopedic fashion.    After the patient, site, and procedure were once again identified, attention was turned to the right thumb.  An incision was made over the flexor tendon sheath at the level of the A1 pulley.  Dissection was carried out in-line with the flexor tendon sheath and the radial and ulnar digital artery and nerve were protected.  The A1 pulley was identified at the base of the incision.  Under direct visualization, the A1  pulley was divided along the midline in its entirety with care taken to avoid injury to the underlying tendon.  The tendons were examined to ensure that no further catching, popping, clicking or locking occurred with motion of the finger.       At the completion of the procedure, hemostasis was obtained with cautery and direct pressure.  The wounds were copiously irrigated with sterile solution.  The wounds were closed with Prolene.  Sterile dressings were applied, including Xeroform, gauze, tweeners, webril, ACE.  Please note, all sponge, needle, and instrument counts were correct prior to closure.  Loupe magnification was utilized.  The patient tolerated the procedure well.     I was present for all critical portions of the procedure., A qualified resident physician was not available., and A physician assistant was required during the procedure for retraction, tissue handling, dissection and suturing.    Patient Disposition:  PACU  and hemodynamically stable    SIGNATURE: Kwame Cunha MD  DATE: 01/25/24  TIME: 7:58 AM

## 2024-01-25 NOTE — DISCHARGE INSTR - AVS FIRST PAGE
Post Operative Instructions    You have had surgery on your arm today, please read and follow the information below:  Elevate your hand above your elbow during the next 24-48 hours to help with swelling.  Place your hand and arm over your head with motion at your shoulder three times a day.  Do not apply any cream/ointment/oil to your incisions including antibiotics.  Do not soak your hands in standing water (dishwater, tubs, Jacuzzi's, pools, etc.) until given permission (typically 2-3 weeks after injury)    Call the office if you notice any:  Increased numbness or tingling of your hand or fingers that is not relieved with elevation.  Increasing pain that is not controlled with medication.  Difficulty chewing, breathing, swallowing.  Chest pains or shortness of breath.  Fever over 101.4 degrees.    Bandage: Remove bandage after 5 days.    Motion: Move fingers into a fist 5 times a day, DO NOT move any splinted fingers.    Weight bearing status: Avoid heavy lifting (>5 pounds) with the extremity that was operated on until follow up appointment. Normal activities of daily living are OK.    Ice: Ice for 10 minutes every hour as needed for swelling x 24 hours.    Sling: No sling necessary.    Medications:   Naproxen 220 mg two times a day   Tylenol Extended Release 650 mg every 8 hours  Tramadol 1 tab every 6 hours AS needed for pain    After surgery, we would like you to take naproxen 220 mg one tablet by mouth every 12 hours with food  AND Tylenol 650 mg one tablet by mouth every 8 hours  (at breakfast, lunch and dinner) for 3-5 days after your surgery.  Please take these medication EVERYDAY after surgery for 3-5 days, and not just as needed. You can take these medications at the same time.  Taking these medications after surgery will limit your need for prescription pain medication.      We will also prescribe a narcotic pain medication for a limited time after surgery that you can take as needed for moderate or  severe pain.      Follow-up Appointment: 7-10 days.      Please call the office if you have any questions or concerns regarding your post-operative care.

## 2024-02-02 ENCOUNTER — OFFICE VISIT (OUTPATIENT)
Dept: OBGYN CLINIC | Facility: HOSPITAL | Age: 69
End: 2024-02-02

## 2024-02-02 VITALS
SYSTOLIC BLOOD PRESSURE: 138 MMHG | WEIGHT: 225 LBS | DIASTOLIC BLOOD PRESSURE: 76 MMHG | HEART RATE: 92 BPM | HEIGHT: 61 IN | BODY MASS INDEX: 42.48 KG/M2

## 2024-02-02 DIAGNOSIS — M65.311 TRIGGER THUMB OF RIGHT HAND: Primary | ICD-10-CM

## 2024-02-02 PROCEDURE — 99024 POSTOP FOLLOW-UP VISIT: CPT | Performed by: PHYSICIAN ASSISTANT

## 2024-02-02 NOTE — PROGRESS NOTES
"Assessment:   S/P Right trigger thumb release - Right on 1/25/2024    Plan:   Patient was advised to avoid soaking for 2 to 3 days to allow the incision to finish healing  They was advised to use heat massage as demonstrated in the office  They will follow-up with us as needed    Follow Up:  PRN    To Do Next Visit:  Reevaluation      CHIEF COMPLAINT:  Chief Complaint   Patient presents with    Right Thumb - Post-op, Suture / Staple Removal     TFR- 1/25/24         SUBJECTIVE:  Shyla Fink is a 68 y.o. female who presents for follow up after Right trigger thumb release - Right on 1/25/2024.  Today patient has minimal pain and discomfort.  She has no clicking or locking.       PHYSICAL EXAMINATION:  Vital signs: /76   Pulse 92   Ht 5' 1.4\" (1.56 m)   Wt 102 kg (225 lb)   BMI 41.96 kg/m²   General: well developed and well nourished, alert, oriented times 3, and appears comfortable  Psychiatric: Normal    MUSCULOSKELETAL EXAMINATION:  Incision: Clean, dry, intact  Range of Motion: As expected, opposition intact, full composite fist possible, and clicking and locking appreciated  Neurovascular status: Neuro intact, good cap refill  Activity Restrictions: No restrictions  Done today: Sutures out      STUDIES REVIEWED:  No Studies to review      PROCEDURES PERFORMED:  Procedures  No Procedures performed today    "

## 2024-02-20 ENCOUNTER — RA CDI HCC (OUTPATIENT)
Dept: OTHER | Facility: HOSPITAL | Age: 69
End: 2024-02-20

## 2024-03-14 LAB
CREAT UR-MCNC: 140 MG/DL (ref 20–275)
HBA1C MFR BLD: 6.2 % OF TOTAL HGB
PROT UR-MCNC: 19 MG/DL (ref 5–24)
PROT/CREAT UR: 0.14 MG/MG CREAT (ref 0.02–0.18)
PROT/CREAT UR: 136 MG/G CREAT (ref 24–184)

## 2024-03-15 DIAGNOSIS — K62.89 ANAL OR RECTAL PAIN: ICD-10-CM

## 2024-03-21 ENCOUNTER — OFFICE VISIT (OUTPATIENT)
Dept: FAMILY MEDICINE CLINIC | Facility: CLINIC | Age: 69
End: 2024-03-21
Payer: COMMERCIAL

## 2024-03-21 VITALS
HEART RATE: 84 BPM | RESPIRATION RATE: 15 BRPM | WEIGHT: 225.1 LBS | SYSTOLIC BLOOD PRESSURE: 140 MMHG | BODY MASS INDEX: 41.42 KG/M2 | OXYGEN SATURATION: 98 % | HEIGHT: 62 IN | DIASTOLIC BLOOD PRESSURE: 80 MMHG

## 2024-03-21 DIAGNOSIS — I10 ESSENTIAL HYPERTENSION: Primary | ICD-10-CM

## 2024-03-21 DIAGNOSIS — Z12.31 ENCOUNTER FOR SCREENING MAMMOGRAM FOR MALIGNANT NEOPLASM OF BREAST: ICD-10-CM

## 2024-03-21 DIAGNOSIS — E66.01 MORBID OBESITY WITH BMI OF 40.0-44.9, ADULT (HCC): ICD-10-CM

## 2024-03-21 DIAGNOSIS — E55.9 VITAMIN D DEFICIENCY: ICD-10-CM

## 2024-03-21 DIAGNOSIS — E78.2 MIXED HYPERLIPIDEMIA: ICD-10-CM

## 2024-03-21 DIAGNOSIS — F33.42 RECURRENT MAJOR DEPRESSIVE DISORDER, IN FULL REMISSION (HCC): ICD-10-CM

## 2024-03-21 DIAGNOSIS — Z78.0 MENOPAUSE: ICD-10-CM

## 2024-03-21 DIAGNOSIS — E11.9 TYPE 2 DIABETES MELLITUS WITHOUT COMPLICATION, WITHOUT LONG-TERM CURRENT USE OF INSULIN (HCC): ICD-10-CM

## 2024-03-21 PROCEDURE — G2211 COMPLEX E/M VISIT ADD ON: HCPCS | Performed by: FAMILY MEDICINE

## 2024-03-21 PROCEDURE — 99214 OFFICE O/P EST MOD 30 MIN: CPT | Performed by: FAMILY MEDICINE

## 2024-03-21 RX ORDER — METFORMIN HYDROCHLORIDE 500 MG/1
500 TABLET, EXTENDED RELEASE ORAL 2 TIMES DAILY WITH MEALS
Qty: 180 TABLET | Refills: 1 | Status: SHIPPED | OUTPATIENT
Start: 2024-03-21

## 2024-03-21 NOTE — PROGRESS NOTES
ASSESSMENT/PLAN:    Type 2 diabetes  A1c improved 6.2 from 6.3  We will continue metformin and recheck her A1c and fasting labs before next visit in 4 months     Hyperlipidemia  Atorvastatin and ezetimibe  Check cholesterol before next visit     Erythrocytosis  Continues to cut down smoking with improvement in hemoglobin  Work-up in the past with ferritin was normal  About 30 cigarettes a day  Patient to try to get down to 20 cigarettes by next visit     Depression  Continue citalopram doing well     High risk for sleep apnea  Patient still not interested in set up an appointment with sleep medicine      Colon polyps  Next colonoscopy October 2025     Hypertension   Blood pressure is very good with metoprolol  140/80  Hopefully cutting down on smoking will bring this down      Vitamin-D deficiency   Continue vitamin-D 5000 units daily units  Check vitamin D level before next visit        Recheck in 4 months  A1c albumin and screening lab      Return sooner if needed    Your A1c is good and is staying nice and stable at 6.2    Please try to cut down to 20 cigarettes a day    Use Claritin generic or name brand or Allegra name brand only once a day for your allergies  It will not interfere with your medications and it will not make you tired    Please schedule your mammogram and DEXA together for on or after September 15 and do this this week so you do not have to worry about it and not be delayed    See you back in 4 months with fasting blood work and urine one or 2 weeks prior see you back sooner if needed             Health Maintenance   Topic Date Due    Kidney Health Evaluation: Albumin/Creatinine Ratio  12/31/2020    COVID-19 Vaccine (8 - 2023-24 season) 11/27/2023    DXA SCAN  04/21/2024    Hepatitis C Screening  04/10/2024 (Originally 1955)    Hepatitis A Vaccine (1 of 2 - Risk 2-dose series) 06/13/2024 (Originally 12/3/1974)    HEMOGLOBIN A1C  09/13/2024    Breast Cancer Screening: Mammogram  09/14/2024     Kidney Health Evaluation: GFR  10/17/2024    Fall Risk  10/26/2024    Depression Screening  10/26/2024    Urinary Incontinence Screening  10/26/2024    Medicare Annual Wellness Visit (AWV)  10/26/2024    Depression Follow-up Plan  10/26/2024    Diabetic Foot Exam  10/26/2024    DM Eye Exam  11/29/2025    Colorectal Cancer Screening  12/10/2025    Osteoporosis Screening  Completed    Zoster Vaccine  Completed    Pneumococcal Vaccine: 65+ Years  Completed    Influenza Vaccine  Completed    HIB Vaccine  Aged Out    IPV Vaccine  Aged Out    Meningococcal ACWY Vaccine  Aged Out    HPV Vaccine  Aged Out         Problem List as of 3/21/2024 Reviewed: 2/2/2024  8:46 AM by Perry Troncoso PA-C      Benign colon polyp    Erythrocytosis    Essential hypertension    Insomnia    Mixed hyperlipidemia    Morbid obesity with BMI of 40.0-44.9, adult (HCC)    Psoriasis    Recurrent major depressive disorder, in full remission (HCC)    Type 2 diabetes mellitus without complication, without long-term current use of insulin (HCC)    Uncomplicated alcohol abuse    Vitamin D deficiency         Subjective:   Chief Complaint   Patient presents with    Diabetes    Hyperlipidemia    Vitamin D Deficiency    Hypertension     Patient is here for her diabetes recheck    Overall she is feeling well and had her trigger thumb fixed and it is great and incidentally the other side tends to fix itself afterwards        patient ID: Shyla Fink is a 68 y.o. female.    Patient's past medical history, surgical history, family history, social history, and Tobacco history reviewed with patient.     MED LIST WAS REVIEWED AND UPDATED    ROS  As per HPI  Rest of 12 point review of systems negative     Objective:      VITALS:  Wt Readings from Last 3 Encounters:   03/21/24 102 kg (225 lb 1.6 oz)   02/02/24 102 kg (225 lb)   01/22/24 102 kg (225 lb)     BP Readings from Last 3 Encounters:   03/21/24 140/80   02/02/24 138/76   01/25/24 126/70     Pulse  Readings from Last 3 Encounters:   03/21/24 84   02/02/24 92   01/25/24 67     Body mass index is 41.84 kg/m².    Laboratory Results:   All pertinent labs and studies were reviewed with patient during this office visit with highlights of the results contained in this note in the ASSESSMENT AND PLAN section       Physical Exam    Constitutional  Appears healthy, Looks well, Appearance consistent with age    Mental Status  Alert, Oriented, Cooperative, Memory function normal , clean, and reasonable    Neck  No neck mass, No thyromegaly, Good carotid upstrokes bilaterally, trachea midline positive click    Respiratory  Breath sounds normal, No rales, No rhonchi, No wheezing, normal palpation    Cardiac  Regular rhythm without ectopy or murmur no S3-S4, no heave lift or thrill to palpation    Vascular  No leg edema, No pedal edema    Muscular skeletal  No clubbing cyanosis , muscle tone normal    Skin  No appreciable rashes or abnormal appearing lesions

## 2024-03-21 NOTE — PATIENT INSTRUCTIONS
Your A1c is good and is staying nice and stable at 6.2    Please try to cut down to 20 cigarettes a day    Use Claritin generic or name brand or Allegra name brand only once a day for your allergies  It will not interfere with your medications and it will not make you tired    Please schedule your mammogram and DEXA together for on or after September 15 and do this this week so you do not have to worry about it and not be delayed    See you back in 4 months with fasting blood work and urine one or 2 weeks prior see you back sooner if needed

## 2024-07-08 ENCOUNTER — APPOINTMENT (OUTPATIENT)
Dept: RADIOLOGY | Facility: CLINIC | Age: 69
End: 2024-07-08
Payer: COMMERCIAL

## 2024-07-08 DIAGNOSIS — M54.50 LOW BACK PAIN, UNSPECIFIED BACK PAIN LATERALITY, UNSPECIFIED CHRONICITY, UNSPECIFIED WHETHER SCIATICA PRESENT: ICD-10-CM

## 2024-07-08 PROCEDURE — 72110 X-RAY EXAM L-2 SPINE 4/>VWS: CPT

## 2024-07-23 LAB
25(OH)D3 SERPL-MCNC: 49 NG/ML (ref 30–100)
ALBUMIN SERPL-MCNC: 4.1 G/DL (ref 3.6–5.1)
ALBUMIN/CREAT UR: 10 MG/G CREAT
ALBUMIN/GLOB SERPL: 1.6 (CALC) (ref 1–2.5)
ALP SERPL-CCNC: 73 U/L (ref 37–153)
ALT SERPL-CCNC: 14 U/L (ref 6–29)
APPEARANCE UR: CLEAR
AST SERPL-CCNC: 13 U/L (ref 10–35)
BACTERIA UR QL AUTO: ABNORMAL /HPF
BASOPHILS # BLD AUTO: 40 CELLS/UL (ref 0–200)
BASOPHILS NFR BLD AUTO: 0.4 %
BILIRUB SERPL-MCNC: 0.7 MG/DL (ref 0.2–1.2)
BILIRUB UR QL STRIP: NEGATIVE
BUN SERPL-MCNC: 13 MG/DL (ref 7–25)
BUN/CREAT SERPL: ABNORMAL (CALC) (ref 6–22)
CALCIUM SERPL-MCNC: 9.2 MG/DL (ref 8.6–10.4)
CHLORIDE SERPL-SCNC: 101 MMOL/L (ref 98–110)
CHOLEST SERPL-MCNC: 148 MG/DL
CHOLEST/HDLC SERPL: 3 (CALC)
CO2 SERPL-SCNC: 31 MMOL/L (ref 20–32)
COLOR UR: YELLOW
CREAT SERPL-MCNC: 0.79 MG/DL (ref 0.5–1.05)
CREAT UR-MCNC: 115 MG/DL (ref 20–275)
EOSINOPHIL # BLD AUTO: 130 CELLS/UL (ref 15–500)
EOSINOPHIL NFR BLD AUTO: 1.3 %
ERYTHROCYTE [DISTWIDTH] IN BLOOD BY AUTOMATED COUNT: 13 % (ref 11–15)
EST. AVERAGE GLUCOSE BLD GHB EST-MCNC: 165 MG/DL (CALC)
GFR/BSA.PRED SERPLBLD CYS-BASED-ARV: 82 ML/MIN/1.73M2
GLOBULIN SER CALC-MCNC: 2.5 G/DL (CALC) (ref 1.9–3.7)
GLUCOSE SERPL-MCNC: 129 MG/DL (ref 65–99)
GLUCOSE UR QL STRIP: NEGATIVE
HBA1C MFR BLD: 6.8 % OF TOTAL HGB
HCT VFR BLD AUTO: 46.6 % (ref 35–45)
HDLC SERPL-MCNC: 49 MG/DL
HGB BLD-MCNC: 15.8 G/DL (ref 11.7–15.5)
HGB UR QL STRIP: NEGATIVE
HYALINE CASTS #/AREA URNS LPF: ABNORMAL /LPF
KETONES UR QL STRIP: NEGATIVE
LDLC SERPL CALC-MCNC: 74 MG/DL (CALC)
LEUKOCYTE ESTERASE UR QL STRIP: ABNORMAL
LYMPHOCYTES # BLD AUTO: 1900 CELLS/UL (ref 850–3900)
LYMPHOCYTES NFR BLD AUTO: 19 %
MCH RBC QN AUTO: 32.6 PG (ref 27–33)
MCHC RBC AUTO-ENTMCNC: 33.9 G/DL (ref 32–36)
MCV RBC AUTO: 96.1 FL (ref 80–100)
MICROALBUMIN UR-MCNC: 1.1 MG/DL
MONOCYTES # BLD AUTO: 640 CELLS/UL (ref 200–950)
MONOCYTES NFR BLD AUTO: 6.4 %
NEUTROPHILS # BLD AUTO: 7290 CELLS/UL (ref 1500–7800)
NEUTROPHILS NFR BLD AUTO: 72.9 %
NITRITE UR QL STRIP: NEGATIVE
NONHDLC SERPL-MCNC: 99 MG/DL (CALC)
PH UR STRIP: ABNORMAL [PH] (ref 5–8)
PLATELET # BLD AUTO: 201 THOUSAND/UL (ref 140–400)
PMV BLD REES-ECKER: 13.1 FL (ref 7.5–12.5)
POTASSIUM SERPL-SCNC: 5.1 MMOL/L (ref 3.5–5.3)
PROT SERPL-MCNC: 6.6 G/DL (ref 6.1–8.1)
PROT UR QL STRIP: NEGATIVE
RBC # BLD AUTO: 4.85 MILLION/UL (ref 3.8–5.1)
RBC #/AREA URNS HPF: ABNORMAL /HPF
SODIUM SERPL-SCNC: 140 MMOL/L (ref 135–146)
SP GR UR STRIP: 1.02 (ref 1–1.03)
SQUAMOUS #/AREA URNS HPF: ABNORMAL /HPF
TRIGL SERPL-MCNC: 170 MG/DL
TSH SERPL-ACNC: 1.73 MIU/L (ref 0.4–4.5)
WBC # BLD AUTO: 10 THOUSAND/UL (ref 3.8–10.8)
WBC #/AREA URNS HPF: ABNORMAL /HPF

## 2024-07-23 PROCEDURE — 3061F NEG MICROALBUMINURIA REV: CPT | Performed by: FAMILY MEDICINE

## 2024-07-30 ENCOUNTER — OFFICE VISIT (OUTPATIENT)
Dept: FAMILY MEDICINE CLINIC | Facility: CLINIC | Age: 69
End: 2024-07-30
Payer: COMMERCIAL

## 2024-07-30 VITALS
WEIGHT: 224.3 LBS | OXYGEN SATURATION: 97 % | BODY MASS INDEX: 41.28 KG/M2 | HEART RATE: 72 BPM | DIASTOLIC BLOOD PRESSURE: 82 MMHG | RESPIRATION RATE: 18 BRPM | HEIGHT: 62 IN | SYSTOLIC BLOOD PRESSURE: 124 MMHG

## 2024-07-30 DIAGNOSIS — E11.9 TYPE 2 DIABETES MELLITUS WITHOUT COMPLICATION, WITHOUT LONG-TERM CURRENT USE OF INSULIN (HCC): Primary | ICD-10-CM

## 2024-07-30 DIAGNOSIS — E55.9 VITAMIN D DEFICIENCY: ICD-10-CM

## 2024-07-30 DIAGNOSIS — I10 ESSENTIAL HYPERTENSION: ICD-10-CM

## 2024-07-30 DIAGNOSIS — D75.1 ERYTHROCYTOSIS: ICD-10-CM

## 2024-07-30 DIAGNOSIS — F33.42 RECURRENT MAJOR DEPRESSIVE DISORDER, IN FULL REMISSION (HCC): ICD-10-CM

## 2024-07-30 DIAGNOSIS — F10.10 UNCOMPLICATED ALCOHOL ABUSE: ICD-10-CM

## 2024-07-30 DIAGNOSIS — E78.2 MIXED HYPERLIPIDEMIA: ICD-10-CM

## 2024-07-30 PROCEDURE — 3725F SCREEN DEPRESSION PERFORMED: CPT | Performed by: FAMILY MEDICINE

## 2024-07-30 PROCEDURE — 99214 OFFICE O/P EST MOD 30 MIN: CPT | Performed by: FAMILY MEDICINE

## 2024-07-30 PROCEDURE — 93000 ELECTROCARDIOGRAM COMPLETE: CPT | Performed by: FAMILY MEDICINE

## 2024-07-30 PROCEDURE — 1101F PT FALLS ASSESS-DOCD LE1/YR: CPT | Performed by: FAMILY MEDICINE

## 2024-07-30 PROCEDURE — 3079F DIAST BP 80-89 MM HG: CPT | Performed by: FAMILY MEDICINE

## 2024-07-30 PROCEDURE — 3288F FALL RISK ASSESSMENT DOCD: CPT | Performed by: FAMILY MEDICINE

## 2024-07-30 PROCEDURE — 3074F SYST BP LT 130 MM HG: CPT | Performed by: FAMILY MEDICINE

## 2024-07-30 PROCEDURE — 1160F RVW MEDS BY RX/DR IN RCRD: CPT | Performed by: FAMILY MEDICINE

## 2024-07-30 PROCEDURE — 1159F MED LIST DOCD IN RCRD: CPT | Performed by: FAMILY MEDICINE

## 2024-07-30 NOTE — PATIENT INSTRUCTIONS
Do not restart metformin  Instead begin Jardiance 10 mg each morning    You back in 3-1/2 months with an A1c which is nonfasting drawn at about 3 months

## 2024-07-30 NOTE — PROGRESS NOTES
ASSESSMENT/PLAN:    Type 2 diabetes  A1c 6.8 from 6.2 by stopping metformin  Because of side effects we will leave it stopped  We will start Jardiance 10 mg every morning  Check in 3 and half months and see how she is doing     Hyperlipidemia  Continue atorvastatin and ezetimibe  Cholesterol excellent at 148 with a LDL of 74     Erythrocytosis  Smoking work as it is still at pack and half a day and she is working on cutting down  Work-up in the past with ferritin was normal  Patient to try to get down to 20 cigarettes by next visit     Depression  Continue citalopram doing well     High risk for sleep apnea  Patient still not interested in set up an appointment with sleep medicine      Colon polyps  Next colonoscopy October 2025     Hypertension   Pressure excellent 124/82  Continue metoprolol  EKG today      Vitamin-D deficiency   Continue vitamin-D 5000 units daily units  Vitamin D good at 49        Recheck in 2 and half months months  A1c       Return sooner if needed          Health Maintenance   Topic Date Due    Hepatitis A Vaccine (1 of 2 - Risk 2-dose series) Never done    RSV Vaccine Age 60+ Years (1 - 1-dose 60+ series) Never done    DXA SCAN  04/21/2024    Influenza Vaccine (1) 09/01/2024    Breast Cancer Screening: Mammogram  09/14/2024    Medicare Annual Wellness Visit (AWV)  10/26/2024    Hepatitis C Screening  08/30/2026 (Originally 1955)    HEMOGLOBIN A1C  01/22/2025    Kidney Health Evaluation: GFR  07/22/2025    Kidney Health Evaluation: Albumin/Creatinine Ratio  07/22/2025    Fall Risk  07/30/2025    Depression Screening  07/30/2025    Urinary Incontinence Screening  07/30/2025    Diabetic Foot Exam  07/30/2025    DM Eye Exam  11/29/2025    Colorectal Cancer Screening  12/10/2025    Osteoporosis Screening  Completed    Zoster Vaccine  Completed    Pneumococcal Vaccine: 65+ Years  Completed    COVID-19 Vaccine  Completed    RSV Vaccine age 0-20 Months  Aged Out    HIB Vaccine  Aged Out    IPV  Vaccine  Aged Out    Meningococcal ACWY Vaccine  Aged Out    HPV Vaccine  Aged Out         Problem List as of 7/30/2024 Reviewed: 3/21/2024  9:26 AM by Janina Willams,       Benign colon polyp    Erythrocytosis    Essential hypertension    Insomnia    Mixed hyperlipidemia    Morbid obesity with BMI of 40.0-44.9, adult (HCC)    Psoriasis    Recurrent major depressive disorder, in full remission (HCC)    Type 2 diabetes mellitus without complication, without long-term current use of insulin (HCC)    Uncomplicated alcohol abuse    Vitamin D deficiency         Subjective:   Chief Complaint   Patient presents with    Diabetes    Hypertension    Hyperlipidemia    Vitamin D Deficiency     Patient is here for her diabetes recheck    Because the metformin has been causing diarrhea for such a long time patient was frustrated and exhausted from this and stopped the metformin for a couple weeks.  As a result her diarrhea went away and she wants to discuss taking something else instead    She did get all her blood work done for us to review and she has her mammogram and DEXA scheduled        patient ID: Shyla Fink is a 68 y.o. female.    Patient's past medical history, surgical history, family history, social history, and Tobacco history reviewed with patient.     MED LIST WAS REVIEWED AND UPDATED    ROS  As per HPI  Rest of 12 point review of systems negative     Objective:      VITALS:  Wt Readings from Last 3 Encounters:   07/30/24 102 kg (224 lb 4.8 oz)   03/21/24 102 kg (225 lb 1.6 oz)   02/02/24 102 kg (225 lb)     BP Readings from Last 3 Encounters:   07/30/24 124/82   03/21/24 140/80   02/02/24 138/76     Pulse Readings from Last 3 Encounters:   07/30/24 72   03/21/24 84   02/02/24 92     Body mass index is 41.69 kg/m².    Laboratory Results:   All pertinent labs and studies were reviewed with patient during this office visit with highlights of the results contained in this note in the ASSESSMENT AND PLAN  section       Physical Exam  Constitutional  Appears healthy, Looks well, Appearance consistent with age    Mental Status  Alert, Oriented, Cooperative, Memory function normal , clean, and reasonable    Neck  No neck mass, No thyromegaly, Good carotid upstrokes bilaterally, trachea midline positive click    Respiratory  Breath sounds normal, No rales, No rhonchi, No wheezing, normal palpation    Cardiac  Regular rhythm without ectopy or murmur no S3-S4, no heave lift or thrill to palpation    Vascular  No leg edema, No pedal edema    Muscular skeletal  No clubbing cyanosis , muscle tone normal    Skin  No appreciable rashes or abnormal appearing lesions

## 2024-09-17 ENCOUNTER — HOSPITAL ENCOUNTER (OUTPATIENT)
Dept: BONE DENSITY | Facility: IMAGING CENTER | Age: 69
Discharge: HOME | End: 2024-09-17
Payer: COMMERCIAL

## 2024-09-17 ENCOUNTER — HOSPITAL ENCOUNTER (OUTPATIENT)
Dept: MAMMOGRAPHY | Facility: IMAGING CENTER | Age: 69
Discharge: HOME | End: 2024-09-17
Payer: COMMERCIAL

## 2024-09-17 VITALS — WEIGHT: 240 LBS | HEIGHT: 62 IN | BODY MASS INDEX: 44.16 KG/M2

## 2024-09-17 VITALS — BODY MASS INDEX: 42.33 KG/M2 | WEIGHT: 224.2 LBS | HEIGHT: 61 IN

## 2024-09-17 DIAGNOSIS — Z12.31 ENCOUNTER FOR SCREENING MAMMOGRAM FOR MALIGNANT NEOPLASM OF BREAST: ICD-10-CM

## 2024-09-17 DIAGNOSIS — Z78.0 MENOPAUSE: ICD-10-CM

## 2024-09-17 PROCEDURE — 77080 DXA BONE DENSITY AXIAL: CPT

## 2024-09-17 PROCEDURE — 77063 BREAST TOMOSYNTHESIS BI: CPT

## 2024-09-17 PROCEDURE — 77067 SCR MAMMO BI INCL CAD: CPT

## 2024-09-24 ENCOUNTER — VBI (OUTPATIENT)
Dept: ADMINISTRATIVE | Facility: OTHER | Age: 69
End: 2024-09-24

## 2024-09-24 NOTE — TELEPHONE ENCOUNTER
09/24/24 11:12 AM     Chart reviewed for Microalbumin Creatinine Urine Ratio OR Albumin Creatinine Urine Ratio  was/were submitted to the patient's insurance.     Atiya Mcguire   [facility]

## 2024-10-24 ENCOUNTER — HOSPITAL ENCOUNTER (OUTPATIENT)
Dept: ULTRASOUND IMAGING | Facility: CLINIC | Age: 69
Discharge: HOME | End: 2024-10-24
Payer: COMMERCIAL

## 2024-10-24 ENCOUNTER — HOSPITAL ENCOUNTER (OUTPATIENT)
Dept: MAMMOGRAPHY | Facility: CLINIC | Age: 69
Discharge: HOME | End: 2024-10-24
Payer: COMMERCIAL

## 2024-10-24 DIAGNOSIS — R92.8 ABNORMAL SCREENING MAMMOGRAM: ICD-10-CM

## 2024-10-24 DIAGNOSIS — F41.9 ANXIETY: ICD-10-CM

## 2024-10-24 PROCEDURE — G0279 TOMOSYNTHESIS, MAMMO: HCPCS

## 2024-10-24 PROCEDURE — 77065 DX MAMMO INCL CAD UNI: CPT

## 2024-10-24 RX ORDER — CITALOPRAM HYDROBROMIDE 20 MG/1
20 TABLET ORAL DAILY
Qty: 90 TABLET | Refills: 1 | Status: SHIPPED | OUTPATIENT
Start: 2024-10-24

## 2024-11-01 DIAGNOSIS — E11.9 TYPE 2 DIABETES MELLITUS WITHOUT COMPLICATION, WITHOUT LONG-TERM CURRENT USE OF INSULIN (HCC): ICD-10-CM

## 2024-11-01 LAB — HBA1C MFR BLD: 6.8 % OF TOTAL HGB

## 2024-11-01 RX ORDER — EMPAGLIFLOZIN 10 MG/1
10 TABLET, FILM COATED ORAL EVERY MORNING
Qty: 90 TABLET | Refills: 1 | Status: SHIPPED | OUTPATIENT
Start: 2024-11-01

## 2024-11-03 ENCOUNTER — RA CDI HCC (OUTPATIENT)
Dept: OTHER | Facility: HOSPITAL | Age: 69
End: 2024-11-03

## 2024-11-11 ENCOUNTER — OFFICE VISIT (OUTPATIENT)
Dept: FAMILY MEDICINE CLINIC | Facility: CLINIC | Age: 69
End: 2024-11-11
Payer: COMMERCIAL

## 2024-11-11 VITALS
DIASTOLIC BLOOD PRESSURE: 74 MMHG | HEART RATE: 80 BPM | BODY MASS INDEX: 41.37 KG/M2 | SYSTOLIC BLOOD PRESSURE: 120 MMHG | WEIGHT: 224.8 LBS | RESPIRATION RATE: 15 BRPM | HEIGHT: 62 IN | OXYGEN SATURATION: 98 %

## 2024-11-11 DIAGNOSIS — E78.2 MIXED HYPERLIPIDEMIA: ICD-10-CM

## 2024-11-11 DIAGNOSIS — F33.42 RECURRENT MAJOR DEPRESSIVE DISORDER, IN FULL REMISSION (HCC): ICD-10-CM

## 2024-11-11 DIAGNOSIS — Z23 NEED FOR COVID-19 VACCINE: ICD-10-CM

## 2024-11-11 DIAGNOSIS — I10 ESSENTIAL HYPERTENSION: ICD-10-CM

## 2024-11-11 DIAGNOSIS — Z23 NEED FOR VACCINATION: ICD-10-CM

## 2024-11-11 DIAGNOSIS — G47.00 INSOMNIA, UNSPECIFIED TYPE: ICD-10-CM

## 2024-11-11 DIAGNOSIS — D75.1 ERYTHROCYTOSIS: ICD-10-CM

## 2024-11-11 DIAGNOSIS — E11.9 TYPE 2 DIABETES MELLITUS WITHOUT COMPLICATION, WITHOUT LONG-TERM CURRENT USE OF INSULIN (HCC): Primary | ICD-10-CM

## 2024-11-11 DIAGNOSIS — Z00.00 MEDICARE ANNUAL WELLNESS VISIT, SUBSEQUENT: ICD-10-CM

## 2024-11-11 DIAGNOSIS — E55.9 VITAMIN D DEFICIENCY: ICD-10-CM

## 2024-11-11 PROCEDURE — G0439 PPPS, SUBSEQ VISIT: HCPCS | Performed by: FAMILY MEDICINE

## 2024-11-11 PROCEDURE — G0008 ADMIN INFLUENZA VIRUS VAC: HCPCS | Performed by: FAMILY MEDICINE

## 2024-11-11 PROCEDURE — 99214 OFFICE O/P EST MOD 30 MIN: CPT | Performed by: FAMILY MEDICINE

## 2024-11-11 PROCEDURE — 91320 SARSCV2 VAC 30MCG TRS-SUC IM: CPT | Performed by: FAMILY MEDICINE

## 2024-11-11 PROCEDURE — 90480 ADMN SARSCOV2 VAC 1/ONLY CMP: CPT | Performed by: FAMILY MEDICINE

## 2024-11-11 PROCEDURE — 90662 IIV NO PRSV INCREASED AG IM: CPT | Performed by: FAMILY MEDICINE

## 2024-11-11 RX ORDER — METOPROLOL SUCCINATE 50 MG/1
50 TABLET, EXTENDED RELEASE ORAL DAILY
Qty: 90 TABLET | Refills: 3 | Status: SHIPPED | OUTPATIENT
Start: 2024-11-11

## 2024-11-11 RX ORDER — ATORVASTATIN CALCIUM 80 MG/1
80 TABLET, FILM COATED ORAL DAILY
Qty: 90 TABLET | Refills: 3 | Status: SHIPPED | OUTPATIENT
Start: 2024-11-11

## 2024-11-11 RX ORDER — EZETIMIBE 10 MG/1
10 TABLET ORAL
Qty: 90 TABLET | Refills: 3 | Status: SHIPPED | OUTPATIENT
Start: 2024-11-11

## 2024-11-11 NOTE — PATIENT INSTRUCTIONS
Regarding cigarettes try to smoke one less cigarette every week and by doing that work your way down  As you know you already stop the Gatorade in the yogurt so we will see what your A1c does all on its own without increasing the Jardiance, but remember to keep yourself hydrated  Consider squeezing and limes dsehaun or oranges into your water to give it a flavor    See you back in about 3 and half months with a nonfasting A1c 3 months      Medicare Preventive Visit Patient Instructions  Thank you for completing your Welcome to Medicare Visit or Medicare Annual Wellness Visit today. Your next wellness visit will be due in one year (11/12/2025).  The screening/preventive services that you may require over the next 5-10 years are detailed below. Some tests may not apply to you based off risk factors and/or age. Screening tests ordered at today's visit but not completed yet may show as past due. Also, please note that scanned in results may not display below.  Preventive Screenings:  Service Recommendations Previous Testing/Comments   Colorectal Cancer Screening  * Colonoscopy    * Fecal Occult Blood Test (FOBT)/Fecal Immunochemical Test (FIT)  * Fecal DNA/Cologuard Test  * Flexible Sigmoidoscopy Age: 45-75 years old   Colonoscopy: every 10 years (may be performed more frequently if at higher risk)  OR  FOBT/FIT: every 1 year  OR  Cologuard: every 3 years  OR  Sigmoidoscopy: every 5 years  Screening may be recommended earlier than age 45 if at higher risk for colorectal cancer. Also, an individualized decision between you and your healthcare provider will decide whether screening between the ages of 76-85 would be appropriate. Colonoscopy: 12/10/2020  FOBT/FIT: Not on file  Cologuard: Not on file  Sigmoidoscopy: Not on file    Screening Current     Breast Cancer Screening Age: 40+ years old  Frequency: every 1-2 years  Not required if history of left and right mastectomy Mammogram: 09/17/2024    Screening Current    Cervical Cancer Screening Between the ages of 21-29, pap smear recommended once every 3 years.   Between the ages of 30-65, can perform pap smear with HPV co-testing every 5 years.   Recommendations may differ for women with a history of total hysterectomy, cervical cancer, or abnormal pap smears in past. Pap Smear: 10/11/2018    Screening Not Indicated   Hepatitis C Screening Once for adults born between 1945 and 1965  More frequently in patients at high risk for Hepatitis C Hep C Antibody: Not on file    Screening Current   Diabetes Screening 1-2 times per year if you're at risk for diabetes or have pre-diabetes Fasting glucose: No results in last 5 years (No results in last 5 years)  A1C: 6.8 % of total Hgb (10/31/2024)  Screening Not Indicated  History Diabetes   Cholesterol Screening Once every 5 years if you don't have a lipid disorder. May order more often based on risk factors. Lipid panel: 07/22/2024    Screening Not Indicated  History Lipid Disorder     Other Preventive Screenings Covered by Medicare:  Abdominal Aortic Aneurysm (AAA) Screening: covered once if your at risk. You're considered to be at risk if you have a family history of AAA.  Lung Cancer Screening: covers low dose CT scan once per year if you meet all of the following conditions: (1) Age 55-77; (2) No signs or symptoms of lung cancer; (3) Current smoker or have quit smoking within the last 15 years; (4) You have a tobacco smoking history of at least 20 pack years (packs per day multiplied by number of years you smoked); (5) You get a written order from a healthcare provider.  Glaucoma Screening: covered annually if you're considered high risk: (1) You have diabetes OR (2) Family history of glaucoma OR (3)  aged 50 and older OR (4)  American aged 65 and older  Osteoporosis Screening: covered every 2 years if you meet one of the following conditions: (1) You're estrogen deficient and at risk for osteoporosis based  off medical history and other findings; (2) Have a vertebral abnormality; (3) On glucocorticoid therapy for more than 3 months; (4) Have primary hyperparathyroidism; (5) On osteoporosis medications and need to assess response to drug therapy.   Last bone density test (DXA Scan): 09/17/2024.  HIV Screening: covered annually if you're between the age of 15-65. Also covered annually if you are younger than 15 and older than 65 with risk factors for HIV infection. For pregnant patients, it is covered up to 3 times per pregnancy.    Immunizations:  Immunization Recommendations   Influenza Vaccine Annual influenza vaccination during flu season is recommended for all persons aged >= 6 months who do not have contraindications   Pneumococcal Vaccine   * Pneumococcal conjugate vaccine = PCV13 (Prevnar 13), PCV15 (Vaxneuvance), PCV20 (Prevnar 20)  * Pneumococcal polysaccharide vaccine = PPSV23 (Pneumovax) Adults 19-63 yo with certain risk factors or if 65+ yo  If never received any pneumonia vaccine: recommend Prevnar 20 (PCV20)  Give PCV20 if previously received 1 dose of PCV13 or PPSV23   Hepatitis B Vaccine 3 dose series if at intermediate or high risk (ex: diabetes, end stage renal disease, liver disease)   Respiratory syncytial virus (RSV) Vaccine - COVERED BY MEDICARE PART D  * RSVPreF3 (Arexvy) CDC recommends that adults 60 years of age and older may receive a single dose of RSV vaccine using shared clinical decision-making (SCDM)   Tetanus (Td) Vaccine - COST NOT COVERED BY MEDICARE PART B Following completion of primary series, a booster dose should be given every 10 years to maintain immunity against tetanus. Td may also be given as tetanus wound prophylaxis.   Tdap Vaccine - COST NOT COVERED BY MEDICARE PART B Recommended at least once for all adults. For pregnant patients, recommended with each pregnancy.   Shingles Vaccine (Shingrix) - COST NOT COVERED BY MEDICARE PART B  2 shot series recommended in those 19  years and older who have or will have weakened immune systems or those 50 years and older     Health Maintenance Due:      Topic Date Due    Hepatitis C Screening  08/30/2026 (Originally 1955)    Breast Cancer Screening: Mammogram  09/17/2025    Colorectal Cancer Screening  12/10/2025    DXA SCAN  09/17/2027    Cervical Cancer Screening  Discontinued     Immunizations Due:      Topic Date Due    Hepatitis A Vaccine (1 of 2 - Risk 2-dose series) Never done    Influenza Vaccine (1) 09/01/2024     Advance Directives   What are advance directives?  Advance directives are legal documents that state your wishes and plans for medical care. These plans are made ahead of time in case you lose your ability to make decisions for yourself. Advance directives can apply to any medical decision, such as the treatments you want, and if you want to donate organs.   What are the types of advance directives?  There are many types of advance directives, and each state has rules about how to use them. You may choose a combination of any of the following:  Living will:  This is a written record of the treatment you want. You can also choose which treatments you do not want, which to limit, and which to stop at a certain time. This includes surgery, medicine, IV fluid, and tube feedings.   Durable power of  for healthcare (DPAHC):  This is a written record that states who you want to make healthcare choices for you when you are unable to make them for yourself. This person, called a proxy, is usually a family member or a friend. You may choose more than 1 proxy.  Do not resuscitate (DNR) order:  A DNR order is used in case your heart stops beating or you stop breathing. It is a request not to have certain forms of treatment, such as CPR. A DNR order may be included in other types of advance directives.  Medical directive:  This covers the care that you want if you are in a coma, near death, or unable to make decisions for  yourself. You can list the treatments you want for each condition. Treatment may include pain medicine, surgery, blood transfusions, dialysis, IV or tube feedings, and a ventilator (breathing machine).  Values history:  This document has questions about your views, beliefs, and how you feel and think about life. This information can help others choose the care that you would choose.  Why are advance directives important?  An advance directive helps you control your care. Although spoken wishes may be used, it is better to have your wishes written down. Spoken wishes can be misunderstood, or not followed. Treatments may be given even if you do not want them. An advance directive may make it easier for your family to make difficult choices about your care.   Fall Prevention    Fall prevention  includes ways to make your home and other areas safer. It also includes ways you can move more carefully to prevent a fall. Health conditions that cause changes in your blood pressure, vision, or muscle strength and coordination may increase your risk for falls. Medicines may also increase your risk for falls if they make you dizzy, weak, or sleepy.   Fall prevention tips:   Stand or sit up slowly.    Use assistive devices as directed.    Wear shoes that fit well and have soles that .    Wear a personal alarm.    Stay active.    Manage your medical conditions.    Home Safety Tips:  Add items to prevent falls in the bathroom.    Keep paths clear.    Install bright lights in your home.    Keep items you use often on shelves within reach.    Paint or place reflective tape on the edges of your stairs.    Urinary Incontinence   Urinary incontinence (UI)  is when you lose control of your bladder. UI develops because your bladder cannot store or empty urine properly. The 3 most common types of UI are stress incontinence, urge incontinence, or both.  Medicines:   May be given to help strengthen your bladder control. Report any side  effects of medication to your healthcare provider.  Do pelvic muscle exercises often:  Your pelvic muscles help you stop urinating. Squeeze these muscles tight for 5 seconds, then relax for 5 seconds. Gradually work up to squeezing for 10 seconds. Do 3 sets of 15 repetitions a day, or as directed. This will help strengthen your pelvic muscles and improve bladder control.  Train your bladder:  Go to the bathroom at set times, such as every 2 hours, even if you do not feel the urge to go. You can also try to hold your urine when you feel the urge to go. For example, hold your urine for 5 minutes when you feel the urge to go. As that becomes easier, hold your urine for 10 minutes.   Self-care:   Keep a UI record.  Write down how often you leak urine and how much you leak. Make a note of what you were doing when you leaked urine.  Drink liquids as directed. You may need to limit the amount of liquid you drink to help control your urine leakage. Do not drink any liquid right before you go to bed. Limit or do not have drinks that contain caffeine or alcohol.   Prevent constipation.  Eat a variety of high-fiber foods. Good examples are high-fiber cereals, beans, vegetables, and whole-grain breads. Walking is the best way to trigger your intestines to have a bowel movement.  Exercise regularly and maintain a healthy weight.  Weight loss and exercise will decrease pressure on your bladder and help you control your leakage.   Use a catheter as directed  to help empty your bladder. A catheter is a tiny, plastic tube that is put into your bladder to drain your urine.   Go to behavior therapy as directed.  Behavior therapy may be used to help you learn to control your urge to urinate.    Cigarette Smoking and Your Health   Risks to your health if you smoke:  Nicotine and other chemicals found in tobacco damage every cell in your body. Even if you are a light smoker, you have an increased risk for cancer, heart disease, and lung  disease. If you are pregnant or have diabetes, smoking increases your risk for complications.   Benefits to your health if you stop smoking:   You decrease respiratory symptoms such as coughing, wheezing, and shortness of breath.   You reduce your risk for cancers of the lung, mouth, throat, kidney, bladder, pancreas, stomach, and cervix. If you already have cancer, you increase the benefits of chemotherapy. You also reduce your risk for cancer returning or a second cancer from developing.   You reduce your risk for heart disease, blood clots, heart attack, and stroke.   You reduce your risk for lung infections, and diseases such as pneumonia, asthma, chronic bronchitis, and emphysema.  Your circulation improves. More oxygen can be delivered to your body. If you have diabetes, you lower your risk for complications, such as kidney, artery, and eye diseases. You also lower your risk for nerve damage. Nerve damage can lead to amputations, poor vision, and blindness.  You improve your body's ability to heal and to fight infections.  For more information and support to stop smoking:   [employer]  Phone: 4- 612 - [phone]  Web Address: www.[employer]  Weight Management   Why it is important to manage your weight:  Being overweight increases your risk of health conditions such as heart disease, high blood pressure, type 2 diabetes, and certain types of cancer. It can also increase your risk for osteoarthritis, sleep apnea, and other respiratory problems. Aim for a slow, steady weight loss. Even a small amount of weight loss can lower your risk of health problems.  How to lose weight safely:  A safe and healthy way to lose weight is to eat fewer calories and get regular exercise. You can lose up about 1 pound a week by decreasing the number of calories you eat by 500 calories each day.   Healthy meal plan for weight management:  A healthy meal plan includes a variety of foods, contains fewer calories, and helps you  stay healthy. A healthy meal plan includes the following:  Eat whole-grain foods more often.  A healthy meal plan should contain fiber. Fiber is the part of grains, fruits, and vegetables that is not broken down by your body. Whole-grain foods are healthy and provide extra fiber in your diet. Some examples of whole-grain foods are whole-wheat breads and pastas, oatmeal, brown rice, and bulgur.  Eat a variety of vegetables every day.  Include dark, leafy greens such as spinach, kale, julian greens, and mustard greens. Eat yellow and orange vegetables such as carrots, sweet potatoes, and winter squash.   Eat a variety of fruits every day.  Choose fresh or canned fruit (canned in its own juice or light syrup) instead of juice. Fruit juice has very little or no fiber.  Eat low-fat dairy foods.  Drink fat-free (skim) milk or 1% milk. Eat fat-free yogurt and low-fat cottage cheese. Try low-fat cheeses such as mozzarella and other reduced-fat cheeses.  Choose meat and other protein foods that are low in fat.  Choose beans or other legumes such as split peas or lentils. Choose fish, skinless poultry (chicken or turkey), or lean cuts of red meat (beef or pork). Before you cook meat or poultry, cut off any visible fat.   Use less fat and oil.  Try baking foods instead of frying them. Add less fat, such as margarine, sour cream, regular salad dressing and mayonnaise to foods. Eat fewer high-fat foods. Some examples of high-fat foods include french fries, doughnuts, ice cream, and cakes.  Eat fewer sweets.  Limit foods and drinks that are high in sugar. This includes candy, cookies, regular soda, and sweetened drinks.  Exercise:  Exercise at least 30 minutes per day on most days of the week. Some examples of exercise include walking, biking, dancing, and swimming. You can also fit in more physical activity by taking the stairs instead of the elevator or parking farther away from stores. Ask your healthcare provider about the  "best exercise plan for you.   Alcohol Use and Your Health    Drinking too much can harm your health.  Excessive alcohol use leads to about 88,000 death in the [place] each year, and shortens the life of those who diet by almost 30 years.  Further, excessive drinking cost the economy $249 billion in 2010.  Most excessive drinkers are not alcohol dependent.    Excessive alcohol use has immediate effects that increase the risk of many harmful health conditions.  These are most often the result of binge drinking.  Over time, excessive alcohol use can lead to the development of chronic diseases and other series health problems.    What is considered a \"drink\"?        Excessive alcohol use includes:  Binge Drinking: For women, 4 or more drinks consumed on one occasion. For men, 5 or more drinks consumed on one occasion.  Heavy Drinking: For women, 8 or more drinks per week. For men, 15 or more drinks per week  Any alcohol used by pregnant women  Any alcohol used by those under the age of 21 years    If you choose to drink, do so in moderation:  Do not drink at all if you are under the age of 21, or if you are or may be pregnant, or have health problems that could be made worse by drinking.  For women, up to 1 drink per day  For men, up to 2 drinks a day    No one should begin drinking or drink more frequently based on potential health benefits    Short-Term Health Risks:  Injuries: motor vehicle crashes, falls, drownings, burns  Violence: homicide, suicide, sexual assault, intimate partner violence  Alcohol poisoning  Reproductive health: risky sexual behaviors, unintended prengnacy, sexually transmitted diseases, miscarriage, stillbirth, fetal alcohol syndrome    Long-Term Health Risks:  Chronic diseases: high blood pressure, heart disease, stroke, liver disease, digestive problems  Cancers: breast, mouth and throat, liver, colon  Learning and memory problems: dementia, poor school performance  Mental health: " depression, anxiety, insomnia  Social problems: lost productivity, family problems, unemployment  Alcohol dependence    For support and more information:  Substance Abuse and Mental Health Services Administration  [address]  [place] [address]  Web Address: http://www.[facility].gov    Alcoholics Anonymous        Web Address: http://www.aa.org    https://www.cdc.gov/alcohol/fact-sheets/alcohol-use.htm     © Copyright [employer] 2018 Information is for End User's use only and may not be sold, redistributed or otherwise used for commercial purposes. All illustrations and images included in CareNotes® are the copyrighted property of [employer] or [employer]

## 2024-11-11 NOTE — ASSESSMENT & PLAN NOTE
Orders:    atorvastatin (LIPITOR) 80 mg tablet; Take 1 tablet (80 mg total) by mouth daily    ezetimibe (ZETIA) 10 mg tablet; Take 1 tablet (10 mg total) by mouth daily at bedtime

## 2024-11-11 NOTE — ASSESSMENT & PLAN NOTE
Lab Results   Component Value Date    HGBA1C 6.8 (H) 10/31/2024       Orders:    Hemoglobin A1C; Future

## 2024-11-11 NOTE — ASSESSMENT & PLAN NOTE
Orders:    metoprolol succinate (TOPROL-XL) 50 mg 24 hr tablet; Take 1 tablet (50 mg total) by mouth daily

## 2024-11-11 NOTE — PROGRESS NOTES
ASSESSMENT/PLAN:    Type 2 diabetes  A1c unchanged 6.8 with adding Jardiance  Secondary to using Gatorade for hydration  Patient will change diet and recheck in 3 months  Diarrhea totally gone with stopping metformin     Hyperlipidemia  Continue atorvastatin and ezetimibe     Erythrocytosis  Smoking work as it is still at pack and half a day and she is working on cutting down  Work-up in the past with ferritin was normal  Patient will try to 1 cigarette/week out to get her self lower     Depression  Continue citalopram doing well     High risk for sleep apnea  Patient still not interested in set up an appointment with sleep medicine      Colon polyps  Next colonoscopy October 2025     Hypertension   Pressure excellent 120/74  Continue metoprolol      Vitamin-D deficiency   Continue vitamin-D 5000 units daily units        Recheck in 4 months   A1c       Return sooner if needed          Health Maintenance   Topic Date Due    Hepatitis A Vaccine (1 of 2 - Risk 2-dose series) Never done    RSV Vaccine Age 60+ Years (1 - 1-dose 60+ series) Never done    Influenza Vaccine (1) 09/01/2024    Medicare Annual Wellness Visit (AWV)  10/26/2024    Hepatitis C Screening  08/30/2026 (Originally 1955)    HEMOGLOBIN A1C  04/30/2025    Kidney Health Evaluation: GFR  07/22/2025    Kidney Health Evaluation: Albumin/Creatinine Ratio  07/22/2025    Depression Screening  07/30/2025    Diabetic Foot Exam  07/30/2025    Breast Cancer Screening: Mammogram  09/17/2025    Fall Risk  11/11/2025    Urinary Incontinence Screening  11/11/2025    DM Eye Exam  11/29/2025    Colorectal Cancer Screening  12/10/2025    DXA SCAN  09/17/2027    Osteoporosis Screening  Completed    Zoster Vaccine  Completed    Pneumococcal Vaccine: 65+ Years  Completed    COVID-19 Vaccine  Completed    RSV Vaccine age 0-20 Months  Aged Out    HIB Vaccine  Aged Out    IPV Vaccine  Aged Out    Meningococcal ACWY Vaccine  Aged Out    HPV Vaccine  Aged Out    Cervical  Cancer Screening  Discontinued         Problem List as of 11/11/2024 Reviewed: 7/30/2024  9:18 AM by Janina Willams DO      Benign colon polyp    Erythrocytosis    Essential hypertension    Insomnia    Mixed hyperlipidemia    Morbid obesity with BMI of 40.0-44.9, adult (HCC)    Psoriasis    Recurrent major depressive disorder, in full remission (HCC)    Type 2 diabetes mellitus without complication, without long-term current use of insulin (HCC)    Uncomplicated alcohol abuse    Vitamin D deficiency         Subjective:   Chief Complaint   Patient presents with    Medicare Wellness Visit     Patient is here for her diabetes recheck    She is happy to report her diarrhea is gone with getting rid of the metformin  However she is really thirsty with the Jardiance and mistakenly started drinking Gatorade until she met its ingredients  She also started yogurt with fruit until she read those ingredients    She had her mammogram and DEXA done as well as her labs for us to review        patient ID: Shyla Fink is a 68 y.o. female.    Patient's past medical history, surgical history, family history, social history, and Tobacco history reviewed with patient.     MED LIST WAS REVIEWED AND UPDATED    ROS  As per HPI  Rest of 12 point review of systems negative     Objective:      VITALS:  Wt Readings from Last 3 Encounters:   11/11/24 102 kg (224 lb 12.8 oz)   09/17/24 102 kg (224 lb 3.2 oz)   09/17/24 109 kg (240 lb)     BP Readings from Last 3 Encounters:   11/11/24 120/74   07/30/24 124/82   03/21/24 140/80     Pulse Readings from Last 3 Encounters:   11/11/24 80   07/30/24 72   03/21/24 84     Body mass index is 41.79 kg/m².    Laboratory Results:   All pertinent labs and studies were reviewed with patient during this office visit with highlights of the results contained in this note in the ASSESSMENT AND PLAN section       Physical Exam  Constitutional  Appears healthy, Looks well, Appearance consistent with  age    Mental Status  Alert, Oriented, Cooperative, Memory function normal , clean, and reasonable    Neck  No neck mass, No thyromegaly, Good carotid upstrokes bilaterally, trachea midline positive click    Respiratory  Breath sounds normal, No rales, No rhonchi, No wheezing, normal palpation    Cardiac  Regular rhythm without ectopy or murmur no S3-S4, no heave lift or thrill to palpation    Vascular  No leg edema, No pedal edema    Muscular skeletal  No clubbing cyanosis , muscle tone normal    Skin  No appreciable rashes or abnormal appearing lesions

## 2024-11-11 NOTE — PROGRESS NOTES
Ambulatory Visit  Name: Shyla Fink      : 1955      MRN: [id number]  Encounter Provider: Janina Willams DO  Encounter Date: 2024   Encounter department: [facility]    Assessment & Plan  Medicare annual wellness visit, subsequent         Need for COVID-19 vaccine    Orders:    COVID-19 Pfizer mRNA vaccine 12 yr and older (Comirnaty pre-filled syringe)    Type 2 diabetes mellitus without complication, without long-term current use of insulin ([facility])    Lab Results   Component Value Date    HGBA1C 6.8 (H) 10/31/2024       Orders:    Hemoglobin A1C; Future    Erythrocytosis         Insomnia, unspecified type         Essential hypertension    Orders:    metoprolol succinate (TOPROL-XL) 50 mg 24 hr tablet; Take 1 tablet (50 mg total) by mouth daily    Mixed hyperlipidemia    Orders:    atorvastatin (LIPITOR) 80 mg tablet; Take 1 tablet (80 mg total) by mouth daily    ezetimibe (ZETIA) 10 mg tablet; Take 1 tablet (10 mg total) by mouth daily at bedtime    Recurrent major depressive disorder, in full remission ([facility])           Vitamin D deficiency           Urinary Incontinence Plan of Care: counseling topics discussed: practice Kegel (pelvic floor strengthening) exercises and preventing constipation.     Tobacco Cessation Counseling: Tobacco cessation counseling was provided. The patient is sincerely urged to quit consumption of tobacco. She is ready to quit tobacco. 1.5 cig day      Preventive health issues were discussed with patient, and age appropriate screening tests were ordered as noted in patient's After Visit Summary. Personalized health advice and appropriate referrals for health education or preventive services given if needed, as noted in patient's After Visit Summary.    History of Present Illness     HPI   Patient Care Team:  Janina Willams DO as PCP - General  Janina Willams DO as PCP - [facility]CP-[facility] ([facility])  Janina Willams DO as PCP - PCP-[facility]  ([facility])  Janina Willams DO    Review of Systems  Medical History Reviewed by provider this encounter:  Tobacco  Allergies  Meds  Problems  Med Hx  Surg Hx  Fam Hx       Annual Wellness Visit Questionnaire   Shyla is here for her Subsequent Wellness visit.     Health Risk Assessment:   Patient rates overall health as good. Patient feels that their physical health rating is same. Patient is satisfied with their life. Eyesight was rated as same. Hearing was rated as same. Patient feels that their emotional and mental health rating is same. Patients states they are sometimes angry. Patient states they are never, rarely unusually tired/fatigued. Pain experienced in the last 7 days has been none. Patient states that she has experienced no weight loss or gain in last 6 months.     Fall Risk Screening:   In the past year, patient has experienced: history of falling in past year      Urinary Incontinence Screening:   Patient has leaked urine accidently in the last six months.     Home Safety:  Patient does not have trouble with stairs inside or outside of their home. Patient has working smoke alarms and has no working carbon monoxide detector. Home safety hazards include: none.     Nutrition:   Current diet is Limited junk food.     Medications:   Patient is not currently taking any over-the-counter supplements. Patient is able to manage medications.     Activities of Daily Living (ADLs)/Instrumental Activities of Daily Living (IADLs):   Walk and transfer into and out of bed and chair?: Yes  Dress and groom yourself?: Yes    Bathe or shower yourself?: Yes    Feed yourself? Yes  Do your laundry/housekeeping?: Yes  Manage your money, pay your bills and track your expenses?: Yes  Make your own meals?: Yes    Do your own shopping?: Yes    Previous Hospitalizations:   Any hospitalizations or ED visits within the last 12 months?: No      Advance Care Planning:   Living will: Yes    Durable POA for healthcare: No    Advanced  directive: Yes    Advanced directive counseling given: Yes    End of Life Decisions reviewed with patient: Yes      Cognitive Screening:   Provider or family/friend/caregiver concerned regarding cognition?: No    PREVENTIVE SCREENINGS      Cardiovascular Screening:    General: Screening Not Indicated and History Lipid Disorder      Diabetes Screening:     General: Screening Not Indicated and History Diabetes      Colorectal Cancer Screening:     General: Screening Current      Breast Cancer Screening:     General: Screening Current      Cervical Cancer Screening:    General: Screening Not Indicated      Osteoporosis Screening:    General: Screening Current      Abdominal Aortic Aneurysm (AAA) Screening:        General: Screening Current      Lung Cancer Screening:     General: Screening Not Indicated      Hepatitis C Screening:    General: Screening Current    Screening, Brief Intervention, and Referral to Treatment (SBIRT)    Screening  Typical number of drinks in a day: 0  Typical number of drinks in a week: 6  Interpretation: Low risk drinking behavior.    AUDIT-C Screenin) How often did you have a drink containing alcohol in the past year? 2 to 3 times a week  2) How many drinks did you have on a typical day when you were drinking in the past year? 1 to 2  3) How often did you have 6 or more drinks on one occasion in the past year? less than monthly    AUDIT-C Score: 4  Interpretation: Score 3-12 (female): POSITIVE screen for alcohol misuse    AUDIT Screenin) How often during the last year have you found that you were not able to stop drinking once you had started? 0 - never  5) How often during the last year have you failed to do what was normally expected from you because of drinking? 0 - never  6) How often during the last year have you needed a first drink in the morning to get yourself going after a heavy drinking session? 0 - never  7) How often during the last year have you had a feeling of  "guilt or remorse after drinking? 0 - never  8) How often during the last year have you been unable to remember what happened the night before because you had been drinking? 0 - never  9) Have you or someone else been injured as a result of your drinking? 0 - no  10) Has a relative or friend or a doctor or another health worker been concerned about your drinking or suggested you cut down? 0 - no    AUDIT Score: 4  Interpretation: Low risk alcohol consumption    Single Item Drug Screening:  How often have you used an illegal drug (including marijuana) or a prescription medication for non-medical reasons in the past year? never    Single Item Drug Screen Score: 0  Interpretation: Negative screen for possible drug use disorder    Brief Intervention  Alcohol & drug use screenings were reviewed. No concerns regarding substance use disorder identified. Healthy alcohol use/limits discussed.     Other Counseling Topics:   Regular weightbearing exercise and calcium and vitamin D intake.     Social Determinants of Health     Financial Resource Strain: Low Risk  (10/26/2023)    Overall Financial Resource Strain (CARDIA)     Difficulty of Paying Living Expenses: Not hard at all   Food Insecurity: No Food Insecurity (11/4/2024)    Hunger Vital Sign     Worried About Running Out of Food in the Last Year: Never true     Ran Out of Food in the Last Year: Never true   Transportation Needs: No Transportation Needs (11/4/2024)    PRAPARE - Transportation     Lack of Transportation (Medical): No     Lack of Transportation (Non-Medical): No   Housing Stability: Low Risk  (11/4/2024)    Housing Stability Vital Sign     Unable to Pay for Housing in the Last Year: No     Number of Times Moved in the Last Year: 0     Homeless in the Last Year: No   Utilities: Not At Risk (11/4/2024)    [facility] Utilities     Threatened with loss of utilities: No     No results found.    Objective     /74   Pulse 80   Resp 15   Ht 5' 1.5\" (1.562 m)   Wt " 102 kg (224 lb 12.8 oz)   SpO2 98%   BMI 41.79 kg/m²     Physical Exam

## 2025-01-13 ENCOUNTER — OFFICE VISIT (OUTPATIENT)
Dept: FAMILY MEDICINE CLINIC | Facility: CLINIC | Age: 70
End: 2025-01-13
Payer: COMMERCIAL

## 2025-01-13 VITALS
BODY MASS INDEX: 40.82 KG/M2 | SYSTOLIC BLOOD PRESSURE: 130 MMHG | TEMPERATURE: 96.8 F | DIASTOLIC BLOOD PRESSURE: 82 MMHG | HEIGHT: 62 IN | RESPIRATION RATE: 18 BRPM | WEIGHT: 221.8 LBS | OXYGEN SATURATION: 95 % | HEART RATE: 80 BPM

## 2025-01-13 DIAGNOSIS — H65.03 NON-RECURRENT ACUTE SEROUS OTITIS MEDIA OF BOTH EARS: ICD-10-CM

## 2025-01-13 DIAGNOSIS — R09.81 NASAL CONGESTION: Primary | ICD-10-CM

## 2025-01-13 DIAGNOSIS — E11.9 TYPE 2 DIABETES MELLITUS WITHOUT COMPLICATION, WITHOUT LONG-TERM CURRENT USE OF INSULIN (HCC): ICD-10-CM

## 2025-01-13 LAB
SARS-COV-2 AG UPPER RESP QL IA: NEGATIVE
VALID CONTROL: NORMAL

## 2025-01-13 PROCEDURE — 87811 SARS-COV-2 COVID19 W/OPTIC: CPT | Performed by: FAMILY MEDICINE

## 2025-01-13 PROCEDURE — 99214 OFFICE O/P EST MOD 30 MIN: CPT | Performed by: FAMILY MEDICINE

## 2025-01-13 RX ORDER — PREDNISONE 10 MG/1
TABLET ORAL
Qty: 20 TABLET | Refills: 0 | Status: SHIPPED | OUTPATIENT
Start: 2025-01-13

## 2025-01-13 NOTE — PROGRESS NOTES
Assessment/Plan:    Nasal congestion/otitis media  Prednisone taper should do the trick  If not patient will let us know    Type 2 diabetes  Patient to remind us that her A1c might be slightly elevated because of the week of prednisone      Depression Screening and Follow-up Plan: Patient was screened for depression during today's encounter. They screened negative with a PHQ-9 score of 0.    Tobacco Cessation Counseling: Tobacco cessation counseling was provided. The patient is sincerely urged to quit consumption of tobacco. She is ready to quit tobacco.           Subjective:   Shyla Fink is a 69 y.o.female  Chief Complaint   Patient presents with    Headache     Started after [place]   Gets dizzy with certain movements   Nasal congestion   Used dayquil and nitequil for 4 days w/ some relief   Nasal spray with no relief      On December 27 patient woke up with her head feeling full of mucus and constantly blowing her nose  She just figured she had a cold and was tired and let it run its course  In a week she felt better    Then a couple days later she started feeling very fatigued and wanted to sleep all day  Now for the last week she has had constant congestion in her head and the thing that bothers her the most was dizziness with changing of position but not consistently and a loss of balance        Past medical history, social history, and family history reviewed as appropriate for the complaint of this patient.      MEDICATIONS REVIEWED AND UPDATED    10 point review of systems performed, the remainder of the ROS is negative except for what is noted in the history of chief complaint    Objective:    Vitals:    01/13/25 1320   BP: 130/82   Pulse: 80   Resp: 18   Temp: (!) 96.8 °F (36 °C)   SpO2: 95%     Body mass index is 41.23 kg/m².    Physical Exam    Constitutional  Appears healthy, Looks well, Appearance consistent with age    Mental Status  Alert, Oriented, Cooperative, Memory function normal ,  clean, and reasonable    HEENT   bilateral TMs are slightly pink clear small amount of serous fluid seen behind the TMs and bulging slightly    Neck  No neck mass, No thyromegaly, Good carotid upstrokes bilaterally, trachea midline positive click    Respiratory  Breath sounds normal, No rales, No rhonchi, No wheezing, normal palpation    Cardiac  Regular rhythm without ectopy or murmur no S3-S4, no heave lift or thrill to palpation    Vascular  No leg edema, No pedal edema    Muscular skeletal  No clubbing cyanosis , muscle tone normal    Skin  No appreciable rashes or abnormal appearing lesions

## 2025-01-13 NOTE — PATIENT INSTRUCTIONS
Please take prednisone as follows:    Today Monday and tomorrow Tuesday 4 pills at once after eating  Wednesday and Thursday 3 pills at once after eating  Friday and Saturday 2 pills at once after eating  Sunday and Monday 1 pill daily after eating    Hopefully this will open up everything stop the congestion and stop the dizziness  As long as you continuously improved fine  If you become worse please call   room air

## 2025-04-15 LAB — HBA1C MFR BLD: 6.7 %

## 2025-05-06 ENCOUNTER — RA CDI HCC (OUTPATIENT)
Dept: OTHER | Facility: HOSPITAL | Age: 70
End: 2025-05-06

## 2025-05-13 ENCOUNTER — TELEPHONE (OUTPATIENT)
Age: 70
End: 2025-05-13

## 2025-05-13 ENCOUNTER — OFFICE VISIT (OUTPATIENT)
Dept: FAMILY MEDICINE CLINIC | Facility: CLINIC | Age: 70
End: 2025-05-13
Payer: COMMERCIAL

## 2025-05-13 VITALS
SYSTOLIC BLOOD PRESSURE: 126 MMHG | RESPIRATION RATE: 15 BRPM | WEIGHT: 221.2 LBS | HEIGHT: 62 IN | HEART RATE: 70 BPM | DIASTOLIC BLOOD PRESSURE: 82 MMHG | BODY MASS INDEX: 40.7 KG/M2 | OXYGEN SATURATION: 98 % | TEMPERATURE: 97.8 F

## 2025-05-13 DIAGNOSIS — F33.42 RECURRENT MAJOR DEPRESSIVE DISORDER, IN FULL REMISSION (HCC): ICD-10-CM

## 2025-05-13 DIAGNOSIS — E78.2 MIXED HYPERLIPIDEMIA: ICD-10-CM

## 2025-05-13 DIAGNOSIS — D75.1 ERYTHROCYTOSIS: ICD-10-CM

## 2025-05-13 DIAGNOSIS — Z12.31 ENCOUNTER FOR SCREENING MAMMOGRAM FOR BREAST CANCER: ICD-10-CM

## 2025-05-13 DIAGNOSIS — E55.9 VITAMIN D DEFICIENCY: ICD-10-CM

## 2025-05-13 DIAGNOSIS — E11.9 TYPE 2 DIABETES MELLITUS WITHOUT COMPLICATION, WITHOUT LONG-TERM CURRENT USE OF INSULIN (HCC): Primary | ICD-10-CM

## 2025-05-13 DIAGNOSIS — E66.01 MORBID OBESITY WITH BMI OF 40.0-44.9, ADULT (HCC): ICD-10-CM

## 2025-05-13 DIAGNOSIS — I10 ESSENTIAL HYPERTENSION: ICD-10-CM

## 2025-05-13 DIAGNOSIS — F10.10 UNCOMPLICATED ALCOHOL ABUSE: ICD-10-CM

## 2025-05-13 DIAGNOSIS — F41.9 ANXIETY: ICD-10-CM

## 2025-05-13 PROCEDURE — 99214 OFFICE O/P EST MOD 30 MIN: CPT | Performed by: FAMILY MEDICINE

## 2025-05-13 PROCEDURE — G2211 COMPLEX E/M VISIT ADD ON: HCPCS | Performed by: FAMILY MEDICINE

## 2025-05-13 RX ORDER — CITALOPRAM HYDROBROMIDE 20 MG/1
20 TABLET ORAL DAILY
Qty: 90 TABLET | Refills: 1 | Status: SHIPPED | OUTPATIENT
Start: 2025-05-13

## 2025-05-13 NOTE — ASSESSMENT & PLAN NOTE
Check cholesterol values next visit  In the meantime continue ezetimibe and atorvastatin  Orders:    Lipid panel; Future

## 2025-05-13 NOTE — PROGRESS NOTES
Diabetic Foot Exam    Patient's shoes and socks removed.    Right Foot/Ankle   Right Foot Inspection  Skin Exam: skin normal and skin intact. No dry skin, no warmth, no callus, no erythema, no maceration, no abnormal color, no pre-ulcer, no ulcer and no callus.     Toe Exam: ROM and strength within normal limits.     Sensory   Monofilament testing: intact    Vascular  The right DP pulse is 1+. The right PT pulse is 1+.     Left Foot/Ankle  Left Foot Inspection  Skin Exam: skin normal and skin intact. No dry skin, no warmth, no erythema, no maceration, normal color, no pre-ulcer, no ulcer and no callus.     Toe Exam: ROM and strength within normal limits.     Sensory   Monofilament testing: intact    Vascular  The left DP pulse is 1+. The left PT pulse is 1+.     Assign Risk Category  No deformity present  No loss of protective sensation  Weak pulses  Risk: 1

## 2025-05-13 NOTE — ASSESSMENT & PLAN NOTE
Pressure 126/82 excellent  EKG next visit  Continue metoprolol  Orders:    Comprehensive metabolic panel; Future    CBC and differential; Future    TSH, 3rd generation with Free T4 reflex; Future    UA (URINE) with reflex to Scope; Future

## 2025-05-13 NOTE — PATIENT INSTRUCTIONS
Stop the metformin  Start Janumet 1 pill twice daily    If you have side effects or problems call and we will try something else  If it cost too much asked the pharmacist if he has a lower alternative let me know    Recheck in 3-1/2 months with fasting blood work and urine in 3 months

## 2025-05-13 NOTE — PROGRESS NOTES
Name: Shyla Fink      : 1955      MRN: [id number]  Encounter Provider: Janina Willams DO  Encounter Date: 2025   Encounter department: [facility]  :  Really consider saving her cell $5000 a year by stopping smoking    Consider getting the RSV vaccine at the end of the summer say September    Stop the metformin  Start Janumet 1 pill twice daily  If you have side effects or problems call and we will try something else  If it cost too much asked the pharmacist if he has a lower alternative let me know  Recheck in 3-1/2 months with blood work in 3 months  Assessment & Plan  Encounter for screening mammogram for breast cancer  Please call and schedule your mammogram for  this year  Orders:    Mammo screening bilateral w 3d and cad    Erythrocytosis  Secondary to smoking   did math for patient is over $5000 a year  Hopefully she is really mad and will really consider stopping       Essential hypertension  Pressure 126/82 excellent  EKG next visit  Continue metoprolol  Orders:    Comprehensive metabolic panel; Future    CBC and differential; Future    TSH, 3rd generation with Free T4 reflex; Future    UA (URINE) with reflex to Scope; Future    Mixed hyperlipidemia  Check cholesterol values next visit  In the meantime continue ezetimibe and atorvastatin  Orders:    Lipid panel; Future    Morbid obesity with BMI of 40.0-44.9, adult (HCC)    Diet and exercise       Recurrent major depressive disorder, in full remission (HCC)  Continue citalopram doing well         Type 2 diabetes mellitus without complication, without long-term current use of insulin (HCC)  Diarrhea with metformin  Dry mouth with Jardiance  Trial of Janumet thousand-50, 1 pill twice a day  Call with any problems recheck labs and in 3 and half months      Lab Results   Component Value Date    HGBA1C 6.7 (H) 2025       Orders:    Hemoglobin A1C With Mpg; Future    Microalbumin,Urine;  "Future    sitaGLIPtin-metFORMIN (JANUMET)  MG per tablet; Take 1 tablet by mouth 2 (two) times a day with meals    Uncomplicated alcohol abuse  Sometimes on a Sunday Friday and Saturday mostly       Vitamin D deficiency  Continue vitamin D and check level before next visit  Orders:    Vitamin D 25 hydroxy; Future    Anxiety    Orders:    citalopram (CeleXA) 20 mg tablet; Take 1 tablet (20 mg total) by mouth daily           History of Present Illness   Patient is here for her diabetes recheck  We stopped the metformin because of the diarrhea  We asked patient to take Jardiance but it made her mouth so dry she could not even catch up with the water so she could not take it anymore  She went back to the metformin she had left and has a diarrhea but could tolerate this more than the dry mouth    Still smoking about a pack and a half a day and is sick of it and really wants to stop    Has a hand rash that was treated years ago by dermatology wants to make sure that it is in her chart      Review of Systems  Per HPI  Objective   /82   Pulse 70   Temp 97.8 °F (36.6 °C)   Resp 15   Ht 5' 1.5\" (1.562 m)   Wt 100 kg (221 lb 3.2 oz)   SpO2 98%   BMI 41.12 kg/m²      Physical Exam  Constitutional  Appears healthy, Looks well, Appearance consistent with age    Mental Status  Alert, Oriented, Cooperative, Memory function normal , clean, and reasonable    Neck  No neck mass, No thyromegaly, Good carotid upstrokes bilaterally, trachea midline positive click    Respiratory  Breath sounds normal, No rales, No rhonchi, No wheezing, normal palpation    Cardiac  Regular rhythm without ectopy or murmur no S3-S4, no heave lift or thrill to palpation    Vascular  No leg edema, No pedal edema    Muscular skeletal  No clubbing cyanosis , muscle tone normal    Skin  No appreciable rashes or abnormal appearing lesions    "

## 2025-05-13 NOTE — ASSESSMENT & PLAN NOTE
Secondary to smoking   did math for patient is over $5000 a year  Hopefully she is really mad and will really consider stopping

## 2025-05-13 NOTE — TELEPHONE ENCOUNTER
Patient called stating she was seen earlier today and prescribed Janumet.  Patient stated she will not be able to afford medication and would like Metformin prescribed instead.  Please advise.

## 2025-05-13 NOTE — ASSESSMENT & PLAN NOTE
Diarrhea with metformin  Dry mouth with Jardiance  Trial of Janumet thousand-50, 1 pill twice a day  Call with any problems recheck labs and in 3 and half months      Lab Results   Component Value Date    HGBA1C 6.7 (H) 04/14/2025       Orders:    Hemoglobin A1C With Mpg; Future    Microalbumin,Urine; Future    sitaGLIPtin-metFORMIN (JANUMET)  MG per tablet; Take 1 tablet by mouth 2 (two) times a day with meals

## 2025-05-15 NOTE — TELEPHONE ENCOUNTER
Patient did speak with insurance company. There is nothing that she can afford. The metformin is $0 for her, does give her diarrhea but states nothing she can't handle and would like to stay on it. If Dr Willams is ok with that she will need a refill as she will be out of it in 1 week.    Please advise

## 2025-05-19 DIAGNOSIS — E11.9 TYPE 2 DIABETES MELLITUS WITHOUT COMPLICATION, WITHOUT LONG-TERM CURRENT USE OF INSULIN (HCC): ICD-10-CM

## 2025-05-19 RX ORDER — METFORMIN HYDROCHLORIDE 500 MG/1
500 TABLET, EXTENDED RELEASE ORAL 2 TIMES DAILY WITH MEALS
Qty: 180 TABLET | Refills: 1 | Status: SHIPPED | OUTPATIENT
Start: 2025-05-19

## 2025-08-20 LAB
25(OH)D3 SERPL-MCNC: 48 NG/ML (ref 30–100)
ALBUMIN SERPL-MCNC: 4.2 G/DL (ref 3.6–5.1)
ALBUMIN/GLOB SERPL: 1.8 (CALC) (ref 1–2.5)
ALP SERPL-CCNC: 79 U/L (ref 37–153)
ALT SERPL-CCNC: 18 U/L (ref 6–29)
APPEARANCE UR: CLEAR
AST SERPL-CCNC: 18 U/L (ref 10–35)
BASOPHILS # BLD AUTO: 45 CELLS/UL (ref 0–200)
BASOPHILS NFR BLD AUTO: 0.4 %
BILIRUB SERPL-MCNC: 0.6 MG/DL (ref 0.2–1.2)
BILIRUB UR QL STRIP: NEGATIVE
BUN SERPL-MCNC: 11 MG/DL (ref 7–25)
BUN/CREAT SERPL: ABNORMAL (CALC) (ref 6–22)
CALCIUM SERPL-MCNC: 9.2 MG/DL (ref 8.6–10.4)
CHLORIDE SERPL-SCNC: 102 MMOL/L (ref 98–110)
CHOLEST SERPL-MCNC: 145 MG/DL
CHOLEST/HDLC SERPL: 3 (CALC)
CO2 SERPL-SCNC: 30 MMOL/L (ref 20–32)
COLOR UR: YELLOW
CREAT SERPL-MCNC: 0.74 MG/DL (ref 0.5–1.05)
EOSINOPHIL # BLD AUTO: 146 CELLS/UL (ref 15–500)
EOSINOPHIL NFR BLD AUTO: 1.3 %
ERYTHROCYTE [DISTWIDTH] IN BLOOD BY AUTOMATED COUNT: 12.8 % (ref 11–15)
EST. AVERAGE GLUCOSE BLD GHB EST-MCNC: 161 MG/DL (CALC)
GFR/BSA.PRED SERPLBLD CYS-BASED-ARV: 88 ML/MIN/1.73M2
GLOBULIN SER CALC-MCNC: 2.4 G/DL (CALC) (ref 1.9–3.7)
GLUCOSE SERPL-MCNC: 133 MG/DL (ref 65–99)
GLUCOSE UR QL STRIP: NEGATIVE
HBA1C MFR BLD: 6.7 %
HCT VFR BLD AUTO: 47.7 % (ref 35–45)
HDLC SERPL-MCNC: 48 MG/DL
HGB BLD-MCNC: 15.6 G/DL (ref 11.7–15.5)
HGB UR QL STRIP: NEGATIVE
KETONES UR QL STRIP: NEGATIVE
LDLC SERPL CALC-MCNC: 74 MG/DL (CALC)
LEUKOCYTE ESTERASE UR QL STRIP: NEGATIVE
LYMPHOCYTES # BLD AUTO: 2083 CELLS/UL (ref 850–3900)
LYMPHOCYTES NFR BLD AUTO: 18.6 %
MCH RBC QN AUTO: 32.7 PG (ref 27–33)
MCHC RBC AUTO-ENTMCNC: 32.7 G/DL (ref 32–36)
MCV RBC AUTO: 100 FL (ref 80–100)
MICROALBUMIN UR-MCNC: 1.4 MG/DL
MONOCYTES # BLD AUTO: 683 CELLS/UL (ref 200–950)
MONOCYTES NFR BLD AUTO: 6.1 %
NEUTROPHILS # BLD AUTO: 8243 CELLS/UL (ref 1500–7800)
NEUTROPHILS NFR BLD AUTO: 73.6 %
NITRITE UR QL STRIP: NEGATIVE
NONHDLC SERPL-MCNC: 97 MG/DL (CALC)
PH UR STRIP: ABNORMAL [PH] (ref 5–8)
PLATELET # BLD AUTO: 194 THOUSAND/UL (ref 140–400)
PMV BLD REES-ECKER: 12.8 FL (ref 7.5–12.5)
POTASSIUM SERPL-SCNC: 4.5 MMOL/L (ref 3.5–5.3)
PROT SERPL-MCNC: 6.6 G/DL (ref 6.1–8.1)
PROT UR QL STRIP: NEGATIVE
RBC # BLD AUTO: 4.77 MILLION/UL (ref 3.8–5.1)
SODIUM SERPL-SCNC: 140 MMOL/L (ref 135–146)
SP GR UR STRIP: 1.01 (ref 1–1.03)
TRIGL SERPL-MCNC: 149 MG/DL
TSH SERPL-ACNC: 1.81 MIU/L (ref 0.4–4.5)
WBC # BLD AUTO: 11.2 THOUSAND/UL (ref 3.8–10.8)

## 2025-08-21 ENCOUNTER — OFFICE VISIT (OUTPATIENT)
Dept: FAMILY MEDICINE CLINIC | Facility: CLINIC | Age: 70
End: 2025-08-21
Payer: COMMERCIAL

## 2025-08-21 VITALS
DIASTOLIC BLOOD PRESSURE: 84 MMHG | WEIGHT: 222 LBS | HEART RATE: 68 BPM | TEMPERATURE: 97.7 F | OXYGEN SATURATION: 97 % | HEIGHT: 62 IN | RESPIRATION RATE: 18 BRPM | BODY MASS INDEX: 40.85 KG/M2 | SYSTOLIC BLOOD PRESSURE: 130 MMHG

## 2025-08-21 DIAGNOSIS — F33.42 RECURRENT MAJOR DEPRESSIVE DISORDER, IN FULL REMISSION (HCC): ICD-10-CM

## 2025-08-21 DIAGNOSIS — E11.9 TYPE 2 DIABETES MELLITUS WITHOUT COMPLICATION, WITHOUT LONG-TERM CURRENT USE OF INSULIN (HCC): ICD-10-CM

## 2025-08-21 DIAGNOSIS — F41.9 ANXIETY: ICD-10-CM

## 2025-08-21 DIAGNOSIS — K63.5 BENIGN COLON POLYP: ICD-10-CM

## 2025-08-21 DIAGNOSIS — E55.9 VITAMIN D DEFICIENCY: ICD-10-CM

## 2025-08-21 DIAGNOSIS — B35.4 TINEA CORPORIS: ICD-10-CM

## 2025-08-21 DIAGNOSIS — D75.1 ERYTHROCYTOSIS: ICD-10-CM

## 2025-08-21 DIAGNOSIS — E78.2 MIXED HYPERLIPIDEMIA: ICD-10-CM

## 2025-08-21 DIAGNOSIS — Z12.11 ENCOUNTER FOR SCREENING COLONOSCOPY: ICD-10-CM

## 2025-08-21 DIAGNOSIS — I10 ESSENTIAL HYPERTENSION: Primary | ICD-10-CM

## 2025-08-21 DIAGNOSIS — F17.210 SMOKING GREATER THAN 20 PACK YEARS: ICD-10-CM

## 2025-08-21 DIAGNOSIS — F10.10 UNCOMPLICATED ALCOHOL ABUSE: ICD-10-CM

## 2025-08-21 PROCEDURE — 99214 OFFICE O/P EST MOD 30 MIN: CPT | Performed by: FAMILY MEDICINE

## 2025-08-21 PROCEDURE — 93000 ELECTROCARDIOGRAM COMPLETE: CPT | Performed by: FAMILY MEDICINE

## 2025-08-21 PROCEDURE — G2211 COMPLEX E/M VISIT ADD ON: HCPCS | Performed by: FAMILY MEDICINE

## 2025-08-21 RX ORDER — SIMVASTATIN 40 MG
TABLET ORAL
COMMUNITY

## 2025-08-21 RX ORDER — METFORMIN HYDROCHLORIDE 500 MG/1
500 TABLET, EXTENDED RELEASE ORAL 2 TIMES DAILY WITH MEALS
Qty: 200 TABLET | Refills: 2 | Status: SHIPPED | OUTPATIENT
Start: 2025-08-21

## 2025-08-21 RX ORDER — CITALOPRAM HYDROBROMIDE 20 MG/1
20 TABLET ORAL DAILY
Qty: 100 TABLET | Refills: 1 | Status: SHIPPED | OUTPATIENT
Start: 2025-08-21

## 2025-08-21 RX ORDER — FLUCONAZOLE 150 MG/1
TABLET ORAL
Qty: 2 TABLET | Refills: 0 | Status: SHIPPED | OUTPATIENT
Start: 2025-08-21 | End: 2026-08-25

## (undated) DEVICE — STERILE BETHLEHEM PLASTIC HAND: Brand: CARDINAL HEALTH

## (undated) DEVICE — ACE WRAP 4 IN STERILE

## (undated) DEVICE — GLOVE INDICATOR PI UNDERGLOVE SZ 8 BLUE

## (undated) DEVICE — OCCLUSIVE GAUZE STRIP,3% BISMUTH TRIBROMOPHENATE IN PETROLATUM BLEND: Brand: XEROFORM

## (undated) DEVICE — CAST PADDING 4 IN SYNTHETIC STRL

## (undated) DEVICE — SKN PRP WNG SPNGE PVP SCRB STR: Brand: MEDLINE INDUSTRIES, INC.

## (undated) DEVICE — STERI DRAPE 1000 NON-STERILE ROLL

## (undated) DEVICE — GAUZE SPONGES,16 PLY: Brand: CURITY

## (undated) DEVICE — GLOVE SRG BIOGEL 7.5

## (undated) DEVICE — SUT PROLENE 4-0 PC-3 18 IN 8634G